# Patient Record
Sex: MALE | Race: WHITE | RURAL
[De-identification: names, ages, dates, MRNs, and addresses within clinical notes are randomized per-mention and may not be internally consistent; named-entity substitution may affect disease eponyms.]

---

## 2022-10-21 ENCOUNTER — OFFICE VISIT (OUTPATIENT)
Dept: INTERNAL MEDICINE CLINIC | Age: 62
End: 2022-10-21
Payer: COMMERCIAL

## 2022-10-21 VITALS
WEIGHT: 161.4 LBS | DIASTOLIC BLOOD PRESSURE: 94 MMHG | HEIGHT: 72 IN | BODY MASS INDEX: 21.86 KG/M2 | SYSTOLIC BLOOD PRESSURE: 166 MMHG | RESPIRATION RATE: 16 BRPM | HEART RATE: 62 BPM | TEMPERATURE: 97.7 F | OXYGEN SATURATION: 98 %

## 2022-10-21 DIAGNOSIS — K59.00 CONSTIPATION, UNSPECIFIED CONSTIPATION TYPE: ICD-10-CM

## 2022-10-21 DIAGNOSIS — Z76.89 ESTABLISHING CARE WITH NEW DOCTOR, ENCOUNTER FOR: ICD-10-CM

## 2022-10-21 DIAGNOSIS — R21 RASH: Primary | ICD-10-CM

## 2022-10-21 DIAGNOSIS — R10.9 ABDOMINAL PAIN, UNSPECIFIED ABDOMINAL LOCATION: ICD-10-CM

## 2022-10-21 PROCEDURE — 90686 IIV4 VACC NO PRSV 0.5 ML IM: CPT | Performed by: NURSE PRACTITIONER

## 2022-10-21 PROCEDURE — 99204 OFFICE O/P NEW MOD 45 MIN: CPT | Performed by: NURSE PRACTITIONER

## 2022-10-21 RX ORDER — SULFAMETHOXAZOLE AND TRIMETHOPRIM 800; 160 MG/1; MG/1
TABLET ORAL
COMMUNITY
Start: 2022-10-11 | End: 2022-10-21 | Stop reason: ALTCHOICE

## 2022-10-21 RX ORDER — PREDNISONE 20 MG/1
TABLET ORAL
COMMUNITY
Start: 2022-10-11 | End: 2022-10-21 | Stop reason: ALTCHOICE

## 2022-10-21 RX ORDER — CEPHALEXIN 500 MG/1
CAPSULE ORAL
COMMUNITY
Start: 2022-10-11 | End: 2022-10-21 | Stop reason: ALTCHOICE

## 2022-10-21 RX ORDER — HYDROGEN PEROXIDE 3 %
20 SOLUTION, NON-ORAL MISCELLANEOUS DAILY
COMMUNITY
End: 2022-10-21

## 2022-10-21 NOTE — PROGRESS NOTES
Soundra Left (: 1960) is a 63 y.o. male is here for evaluation of the following chief complaint(s): New Patient, ED Follow-up (Seen [facility] 10/11/2022), Abdominal Pain (X 2 weeks; periumbilical; hx hernia; feeling constipated; last BM this morning hard to pass; normal stool; not taking any stool softener or laxative), and Rash (X1 week; buttocks; painful)        Subjective/Objective:   Lynn Chow was seen today for New Patient, ED Follow-up (Seen [facility] 10/11/2022), Abdominal Pain (X 2 weeks; periumbilical; hx hernia; feeling constipated; last BM this morning hard to pass; normal stool; not taking any stool softener or laxative), and Rash (X1 week; buttocks; painful)  Pt presents to the clinic to establish care and for abdominal pain and a rash. Pt was seen in the ER on 10/11/2022 for both issues. Pt had abdominal pain for several months. Pt denies f/n/v/d but has some constipation. Pt denies any urinary complaints. Area of pain is at sight of prior hernia repair approx 5 years ago by Dr Piper Hood. Pt reports hx of gastric ulcer as well. PT prescribed Keflex and Bactrim for possible gastritis on CT which has been completed. Pt rash started last week to the groin area and spread to buttocks and back upper thighs. Pt denies itching. Exposure to animals plants new lotions creams foods or medications. Pt denies itching. Pt denies being in contact with people with a rash recently. Pt given prednisone prescription in the ER which pt states did help. PT denies any significant past medical hx otherwise. Pt also denies any other concerns. Review of Systems   Constitutional: Negative. HENT: Negative. Eyes: Negative. Respiratory: Negative. Cardiovascular: Negative. Gastrointestinal:  Positive for abdominal pain, constipation and nausea. Negative for blood in stool, diarrhea, melena and vomiting. Genitourinary: Negative. Musculoskeletal: Negative. Skin:  Positive for rash. Negative for itching. Neurological: Negative. Endo/Heme/Allergies: Negative. Psychiatric/Behavioral: Negative. Physical Exam  Vitals reviewed. Constitutional:       General: He is not in acute distress. Appearance: Normal appearance. He is normal weight. He is not ill-appearing. HENT:      Head: Normocephalic and atraumatic. Right Ear: Tympanic membrane, ear canal and external ear normal.      Left Ear: Tympanic membrane, ear canal and external ear normal.      Ears:      Comments: Pt denies any acute dental pain or drainage or swelling. Nose: Nose normal.      Mouth/Throat:      Lips: Pink. Mouth: Mucous membranes are moist.      Pharynx: Oropharynx is clear. Eyes:      General: Lids are normal.      Extraocular Movements: Extraocular movements intact. Conjunctiva/sclera: Conjunctivae normal.      Comments: Denies any acute vision changes. Neck:      Thyroid: No thyroid mass, thyromegaly or thyroid tenderness. Vascular: No carotid bruit or JVD. Trachea: Trachea and phonation normal.   Cardiovascular:      Rate and Rhythm: Normal rate and regular rhythm. Pulses: Normal pulses. Radial pulses are 2+ on the right side and 2+ on the left side. Heart sounds: Normal heart sounds, S1 normal and S2 normal.   Pulmonary:      Effort: Pulmonary effort is normal.      Breath sounds: Normal breath sounds and air entry. Abdominal:      General: Abdomen is flat. Bowel sounds are normal.      Palpations: Abdomen is soft. There is no hepatomegaly or splenomegaly. Tenderness: There is generalized abdominal tenderness. There is no right CVA tenderness or left CVA tenderness. Hernia: A hernia is present. Hernia is present in the umbilical area. Comments: Healed scar from prior hernia repair. Pt has some generalized tenderness no guarding or rebound. Musculoskeletal:         General: Normal range of motion.       Cervical back: Full passive range of motion without pain and normal range of motion. No spinous process tenderness or muscular tenderness. Right lower leg: No edema. Left lower leg: No edema. Lymphadenopathy:      Cervical: No cervical adenopathy. Skin:     Capillary Refill: Capillary refill takes less than 2 seconds. Findings: Rash present. Rash is macular. Neurological:      General: No focal deficit present. Mental Status: He is alert and oriented to person, place, and time. Psychiatric:         Mood and Affect: Mood normal.         Behavior: Behavior normal.         Thought Content: Thought content normal.         Judgment: Judgment normal.        BP (!) 166/94 (BP 1 Location: Left arm, BP Patient Position: Sitting)   Pulse 62   Temp 97.7 °F (36.5 °C) (Oral)   Resp 16   Ht 6' (1.829 m)   Wt 161 lb 6.4 oz (73.2 kg)   SpO2 98%   BMI 21.89 kg/m²      No visits with results within 6 Month(s) from this visit. Latest known visit with results is:   No results found for any previous visit. Past Surgical History:   Procedure Laterality Date    HX HERNIA REPAIR          Past Medical History:   Diagnosis Date    Hx of ulcer disease         Current Outpatient Medications   Medication Instructions    hydrocortisone 2 % lotion Topical, 3 TIMES DAILY        Assessment/Plan:     The above diagnosis is a new  problem. We discussed expected course, resolution, and complications of diagnosis in detail. I advised him to call back or return to office if symptoms worsen/change/persist.        ICD-10-CM ICD-9-CM    1. Rash  R21 782.1       2.  Abdominal pain, unspecified abdominal location  R10.9 789.00 REFERRAL TO GENERAL SURGERY      3. Establishing care with new doctor, encounter for  Z76.89 V65.8 INFLUENZA, FLUARIX, FLULAVAL, FLUZONE (AGE 6 MO+), AFLURIA(AGE 3Y+) IM, PF, 0.5 ML      LIPID PANEL      TSH 3RD GENERATION      CBC WITH AUTOMATED DIFF      HEPATITIS C AB, RFLX TO QT BY PCR METABOLIC PANEL, COMPREHENSIVE      PSA, DIAGNOSTIC (PROSTATE SPECIFIC AG)      4. Constipation, unspecified constipation type  K59.00 564.00          Return in about 4 weeks (around 11/18/2022). An electronic signature was used to authenticate this note.   -- Rafael Dial NP

## 2022-10-21 NOTE — PATIENT INSTRUCTIONS
It was a pleasure taking care of you. Please have labs drawn at [facility] as soon as possible. You have and appt with Dr Jaylon Gomez Surgeon on Nov 1st at [address] [address]hele am    [address] [facility], [address]  [facility]    Ph [address] conti for choosing [address].     John Montano, CORY

## 2022-10-21 NOTE — PROGRESS NOTES
Room: 7    Identified pt with two pt identifiers(name and ). Reviewed record in preparation for visit and have obtained necessary documentation. All patient medications has been reviewed. Chief Complaint   Patient presents with    New Patient    ED Follow-up     Seen [facility] ed 10/11/2022    Abdominal Pain     X 2 weeks; periumbilical; hx hernia; feeling constipated; last BM this morning hard to pass; normal stool; not taking any stool softener or laxative    Rash     X1 week; buttocks; painful       Health Maintenance Due   Topic    Hepatitis C Screening     Depression Screen     COVID-19 Vaccine (1)    DTaP/Tdap/Td series (1 - Tdap)    Lipid Screen     Colorectal Cancer Screening Combo     Shingrix Vaccine Age 50> (1 of 2)    Flu Vaccine (1)       Vitals:    10/21/22 0856   BP: (!) 166/94   Pulse: 62   Resp: 16   Temp: 97.7 °F (36.5 °C)   TempSrc: Oral   SpO2: 98%   Weight: 161 lb 6.4 oz (73.2 kg)   Height: 6' (1.829 m)   PainSc:   8   PainLoc: Abdomen       4. Have you been to the ER, urgent care clinic since your last visit? Hospitalized since your last visit? Yes      5. Have you seen or consulted any other health care providers outside of the [address] [place] [address] since your last visit? Include any pap smears or colon screening. No    6. Would you like to receive your flu shot today? YES    Patient is accompanied by self I have received verbal consent from Janae Tolliverllor to discuss any/all medical information while they are present in the room.

## 2022-10-22 LAB
ALBUMIN SERPL-MCNC: 4.4 G/DL (ref 3.8–4.9)
ALBUMIN/GLOB SERPL: 1.6 {RATIO} (ref 1.2–2.2)
ALP SERPL-CCNC: 112 IU/L (ref 44–121)
ALT SERPL-CCNC: 43 IU/L (ref 0–44)
AST SERPL-CCNC: 34 IU/L (ref 0–40)
BASOPHILS # BLD AUTO: 0.1 X10E3/UL (ref 0–0.2)
BASOPHILS NFR BLD AUTO: 1 %
BILIRUB SERPL-MCNC: 0.5 MG/DL (ref 0–1.2)
BUN SERPL-MCNC: 12 MG/DL (ref 6–24)
BUN/CREAT SERPL: 14 (ref 9–20)
CALCIUM SERPL-MCNC: 8.7 MG/DL (ref 8.7–10.2)
CHLORIDE SERPL-SCNC: 99 MMOL/L (ref 96–106)
CHOLEST SERPL-MCNC: 139 MG/DL (ref 100–199)
CO2 SERPL-SCNC: 23 MMOL/L (ref 20–29)
CREAT SERPL-MCNC: 0.83 MG/DL (ref 0.76–1.27)
EGFR: 101 ML/MIN/1.73
EOSINOPHIL # BLD AUTO: 0.1 X10E3/UL (ref 0–0.4)
EOSINOPHIL NFR BLD AUTO: 1 %
ERYTHROCYTE [DISTWIDTH] IN BLOOD BY AUTOMATED COUNT: 14.4 % (ref 11.6–15.4)
GLOBULIN SER CALC-MCNC: 2.8 G/DL (ref 1.5–4.5)
GLUCOSE SERPL-MCNC: 154 MG/DL (ref 70–99)
HCT VFR BLD AUTO: 37.8 % (ref 37.5–51)
HCV AB S/CO SERPL IA: <0.1 S/CO RATIO (ref 0–0.9)
HCV AB SERPL QL IA: NORMAL
HDLC SERPL-MCNC: 85 MG/DL
HGB BLD-MCNC: 12.9 G/DL (ref 13–17.7)
IMM GRANULOCYTES # BLD AUTO: 0.1 X10E3/UL (ref 0–0.1)
IMM GRANULOCYTES NFR BLD AUTO: 1 %
IMP & REVIEW OF LAB RESULTS: NORMAL
LDLC SERPL CALC-MCNC: 44 MG/DL (ref 0–99)
LYMPHOCYTES # BLD AUTO: 1.5 X10E3/UL (ref 0.7–3.1)
LYMPHOCYTES NFR BLD AUTO: 14 %
MCH RBC QN AUTO: 30.1 PG (ref 26.6–33)
MCHC RBC AUTO-ENTMCNC: 34.1 G/DL (ref 31.5–35.7)
MCV RBC AUTO: 88 FL (ref 79–97)
MONOCYTES # BLD AUTO: 0.8 X10E3/UL (ref 0.1–0.9)
MONOCYTES NFR BLD AUTO: 8 %
NEUTROPHILS # BLD AUTO: 7.8 X10E3/UL (ref 1.4–7)
NEUTROPHILS NFR BLD AUTO: 75 %
PLATELET # BLD AUTO: 327 X10E3/UL (ref 150–450)
POTASSIUM SERPL-SCNC: 4 MMOL/L (ref 3.5–5.2)
PROT SERPL-MCNC: 7.2 G/DL (ref 6–8.5)
PSA SERPL-MCNC: 0.2 NG/ML (ref 0–4)
RBC # BLD AUTO: 4.28 X10E6/UL (ref 4.14–5.8)
SODIUM SERPL-SCNC: 137 MMOL/L (ref 134–144)
TRIGL SERPL-MCNC: 44 MG/DL (ref 0–149)
TSH SERPL DL<=0.005 MIU/L-ACNC: 1.15 UIU/ML (ref 0.45–4.5)
VLDLC SERPL CALC-MCNC: 10 MG/DL (ref 5–40)
WBC # BLD AUTO: 10.3 X10E3/UL (ref 3.4–10.8)

## 2022-11-02 ENCOUNTER — OFFICE VISIT (OUTPATIENT)
Dept: SURGERY | Age: 62
End: 2022-11-02
Payer: COMMERCIAL

## 2022-11-02 VITALS
HEIGHT: 72 IN | TEMPERATURE: 98.2 F | SYSTOLIC BLOOD PRESSURE: 176 MMHG | DIASTOLIC BLOOD PRESSURE: 91 MMHG | HEART RATE: 74 BPM | OXYGEN SATURATION: 97 % | BODY MASS INDEX: 21.37 KG/M2 | WEIGHT: 157.8 LBS | RESPIRATION RATE: 18 BRPM

## 2022-11-02 DIAGNOSIS — L76.82 INCISIONAL PAIN: ICD-10-CM

## 2022-11-02 DIAGNOSIS — K59.00 CONSTIPATION, UNSPECIFIED CONSTIPATION TYPE: ICD-10-CM

## 2022-11-02 DIAGNOSIS — Z12.11 ENCOUNTER FOR SCREENING COLONOSCOPY: ICD-10-CM

## 2022-11-02 DIAGNOSIS — Z87.11 HISTORY OF GASTRIC ULCER: Primary | ICD-10-CM

## 2022-11-02 PROCEDURE — 99203 OFFICE O/P NEW LOW 30 MIN: CPT | Performed by: SURGERY

## 2022-11-02 RX ORDER — HYDROCORTISONE 25 MG/ML
LOTION TOPICAL
COMMUNITY
Start: 2022-10-21 | End: 2022-12-01

## 2022-11-02 NOTE — LETTER
11/2/2022    Patient: Roya Mello   YOB: 1960   Date of Visit: 11/2/2022     Christopher Fair NP  [address]  [address] [id number]  Via In Basket    Dear Christopher Fair NP,      Thank you for referring Mr. Aubrey Brown to [address] [place] [address] for evaluation. My notes for this consultation are attached. If you have questions, please do not hesitate to call me. I look forward to following your patient along with you.       Sincerely,    Estela Quintanilla MD

## 2022-11-02 NOTE — PROGRESS NOTES
Surgery History and Physical    Subjective:      Kelley Haney is a 63 y.o. male who presents for evaluation unspecified abdominal pain. He has had no imaging. He is not up to date on his colonoscopy. He has not had follow up since his gastric ulcer. He had a bleeding gastric ulcer in 2014 in [address] [place] [address] that required an ex lap. Then, he developed an inguinal hernia. He went to [facility] for repair. He reports generalized abdominal pain. Patient is able to eat. No bulges down in his groin. He is constipated and has to take miralax. Past Medical History:   Diagnosis Date    GERD (gastroesophageal reflux disease)     Hx of ulcer disease      Past Surgical History:   Procedure Laterality Date    HX HERNIA REPAIR        Family History   Problem Relation Age of Onset    Diabetes Mother     Cancer Mother     Ovarian Cancer Mother     Heart Attack Father     Cancer Brother     Colon Cancer Brother      Social History     Tobacco Use    Smoking status: Never    Smokeless tobacco: Never   Substance Use Topics    Alcohol use: Yes     Comment: 2 40 oz a day. Prior to Admission medications    Medication Sig Start Date End Date Taking? Authorizing Provider   hydrocortisone (HYTONE) 2.5 % lotion APPLY LOTION EXTERNALLY TO AFFECTED AREA THREE TIMES DAILY FOR 10 DAYS 10/21/22  Yes Provider, Historical      No Known Allergies    Review of Systems:  A comprehensive review of systems was negative except for that written in the History of Present Illness. Objective:   Visit Vitals  BP (!) 176/91 (BP 1 Location: Right upper arm, BP Patient Position: Sitting, BP Cuff Size: Small adult)   Pulse 74   Temp 98.2 °F (36.8 °C) (Temporal)   Resp 18   Ht 6' (1.829 m)   Wt 71.6 kg (157 lb 12.8 oz)   SpO2 97%   BMI 21.40 kg/m²         Physical Exam:  Physical Exam:  General:  Alert, cooperative, no distress, appears stated age. Eyes:  Conjunctivae/corneas clear. Ears:  Normal external ear canals both ears.    Nose: Nares normal. Septum midline. Mouth/Throat: Lips, mucosa, and tongue normal. Teeth and gums normal.   Neck: Supple, symmetrical, trachea midline   Back:   Symmetric, no curvature. ROM normal.    Lungs:   Clear to auscultation bilaterally. Heart:  Regular rate and rhythm   Abdomen:   Soft, non-tender. Bowel sounds normal. Well healed midline incision - no palpable hernia identified   Extremities: Extremities normal, atraumatic, no cyanosis or edema. Skin: Rash on his butt         Assessment:     63year old male with generalized abdominal pain    Plan:      Will refer to GI for EGD/Colonoscopy as patient is past due  Patient can follow up with PCP for his rash on his butt  Will obtain a CTAP to rule out any acute abnormality    Tyshawn Dunn MD

## 2022-11-02 NOTE — PROGRESS NOTES
Identified pt with two pt identifiers (name and ). Reviewed chart in preparation for visit and have obtained necessary documentation. Noemi Rapp is a 63 y.o. male  Chief Complaint   Patient presents with    Possible Hernia     Seen at the request of Nadya Cardoso, [place]. Visit Vitals  BP (!) 176/91 (BP 1 Location: Right upper arm, BP Patient Position: Sitting, BP Cuff Size: Small adult)   Pulse 74   Temp 98.2 °F (36.8 °C) (Temporal)   Resp 18   Ht 6' (1.829 m)   Wt 71.6 kg (157 lb 12.8 oz)   SpO2 97%   BMI 21.40 kg/m²       1. Have you been to the ER, urgent care clinic since your last visit? Hospitalized since your last visit? ER [place] abdominal pain. 2. Have you seen or consulted any other health care providers outside of the [address] [place] [address] since your last visit? Include any pap smears or colon screening.  No

## 2022-11-06 ENCOUNTER — TELEPHONE (OUTPATIENT)
Dept: FAMILY MEDICINE CLINIC | Age: 62
End: 2022-11-06

## 2022-11-06 NOTE — TELEPHONE ENCOUNTER
2 patient identifiers verified. Patient calling concerned about a rash. Was given hydrocortisone cream that is not helping. Patient states he was up all night due to rash. Patient was advised to go to urgent care to have rash better assessed.

## 2022-11-09 ENCOUNTER — HOSPITAL ENCOUNTER (OUTPATIENT)
Dept: CT IMAGING | Age: 62
Discharge: HOME | End: 2022-11-09
Attending: SURGERY
Payer: COMMERCIAL

## 2022-11-09 ENCOUNTER — TELEPHONE (OUTPATIENT)
Dept: SURGERY | Age: 62
End: 2022-11-09

## 2022-11-09 PROCEDURE — 74177 CT ABD & PELVIS W/CONTRAST: CPT

## 2022-11-09 PROCEDURE — 74011000636 HC RX REV CODE- 636: Performed by: SURGERY

## 2022-11-09 PROCEDURE — 74011000250 HC RX REV CODE- 250: Performed by: SURGERY

## 2022-11-09 RX ORDER — BARIUM SULFATE 20 MG/ML
450 SUSPENSION ORAL
Status: COMPLETED | OUTPATIENT
Start: 2022-11-09 | End: 2022-11-09

## 2022-11-09 RX ADMIN — BARIUM SULFATE 450 ML: 20 SUSPENSION ORAL at 06:52

## 2022-11-09 RX ADMIN — IOPAMIDOL 100 ML: 755 INJECTION, SOLUTION INTRAVENOUS at 08:20

## 2022-11-09 NOTE — TELEPHONE ENCOUNTER
Called patient. CT: IMPRESSION  No acute intraperitoneal process is identified. Moderate to severe fecal stasis with large stool ball in the rectum. Please see above for additional nonemergent incidental findings. Recommend enemas to help clear him out.  He has an apt with GI in [place].    Raisa Weaver MD

## 2022-11-23 ENCOUNTER — OFFICE VISIT (OUTPATIENT)
Dept: INTERNAL MEDICINE CLINIC | Age: 62
End: 2022-11-23
Payer: COMMERCIAL

## 2022-11-23 ENCOUNTER — PATIENT MESSAGE (OUTPATIENT)
Dept: INTERNAL MEDICINE CLINIC | Age: 62
End: 2022-11-23

## 2022-11-23 VITALS
WEIGHT: 162 LBS | HEIGHT: 72 IN | HEART RATE: 70 BPM | TEMPERATURE: 98 F | BODY MASS INDEX: 21.94 KG/M2 | RESPIRATION RATE: 18 BRPM | OXYGEN SATURATION: 95 % | SYSTOLIC BLOOD PRESSURE: 171 MMHG | DIASTOLIC BLOOD PRESSURE: 85 MMHG

## 2022-11-23 DIAGNOSIS — R21 RASH: Primary | ICD-10-CM

## 2022-11-23 DIAGNOSIS — R03.0 ELEVATED BLOOD PRESSURE READING: ICD-10-CM

## 2022-11-23 PROCEDURE — 99212 OFFICE O/P EST SF 10 MIN: CPT | Performed by: NURSE PRACTITIONER

## 2022-11-23 RX ORDER — DOXYCYCLINE 100 MG/1
100 CAPSULE ORAL 2 TIMES DAILY
Qty: 14 CAPSULE | Refills: 0 | Status: SHIPPED | OUTPATIENT
Start: 2022-11-23 | End: 2022-11-30

## 2022-11-23 RX ORDER — PERMETHRIN 50 MG/G
CREAM TOPICAL
COMMUNITY
Start: 2022-11-07 | End: 2022-12-01

## 2022-11-23 RX ORDER — FAMOTIDINE 20 MG/1
TABLET, FILM COATED ORAL
COMMUNITY
Start: 2022-11-07

## 2022-11-23 RX ORDER — PREDNISONE 50 MG/1
TABLET ORAL
COMMUNITY
Start: 2022-11-07 | End: 2022-12-01

## 2022-11-23 RX ORDER — AMLODIPINE BESYLATE 5 MG/1
5 TABLET ORAL DAILY
Qty: 60 TABLET | Refills: 0 | Status: SHIPPED | OUTPATIENT
Start: 2022-11-23 | End: 2022-12-01 | Stop reason: DRUGHIGH

## 2022-11-23 RX ORDER — PERMETHRIN 50 MG/G
CREAM TOPICAL
Qty: 60 G | Refills: 0 | Status: CANCELLED | OUTPATIENT
Start: 2022-11-23

## 2022-11-23 NOTE — PROGRESS NOTES
Leonila Barone (: 1960) is a 63 y.o. male is here for evaluation of the following chief complaint(s): Hospital Follow Up and Skin Problem (22 hospital encounter for scabies and peptic ucler disease )        Subjective/Objective:   Breanna Dallas was seen today for Hospital Follow Up and Skin Problem (22 hospital encounter for scabies and peptic ucler disease )  Pt states the abdominal pain has resolved. Pt has appt with GI in  for colonoscopy and EGD. PT denies any blood in urine. Pt reports rash improved with the steroid pills and the Acticin but not the steroid cream. After tx with the Acitin the rash worsening again. Blood pressure noted to be elevated but pt in discomfort from the rash. Review of Systems   Constitutional: Negative. HENT: Negative. Eyes: Negative. Cardiovascular: Negative. Gastrointestinal: Negative. Genitourinary: Negative. Skin:  Positive for itching and rash. Neurological: Negative. Endo/Heme/Allergies: Negative. Psychiatric/Behavioral: Negative. Physical Exam  Vitals reviewed. Constitutional:       Appearance: Normal appearance. He is normal weight. HENT:      Head: Normocephalic and atraumatic. Right Ear: External ear normal.      Left Ear: External ear normal.      Nose: Nose normal.      Mouth/Throat:      Mouth: Mucous membranes are moist.   Eyes:      Conjunctiva/sclera: Conjunctivae normal.   Cardiovascular:      Rate and Rhythm: Normal rate and regular rhythm. Pulses: Normal pulses. Heart sounds: Normal heart sounds. Pulmonary:      Effort: Pulmonary effort is normal.      Breath sounds: Normal breath sounds. Abdominal:      Palpations: Abdomen is soft. Musculoskeletal:         General: Normal range of motion. Cervical back: Normal range of motion. Skin:     General: Skin is warm and dry. Capillary Refill: Capillary refill takes less than 2 seconds. Findings: Erythema and rash present. Rash is crusting. Neurological:      General: No focal deficit present. Mental Status: He is alert and oriented to person, place, and time. Psychiatric:         Mood and Affect: Mood normal.         Behavior: Behavior normal.         Thought Content: Thought content normal.         Judgment: Judgment normal.        BP (!) 171/85 (BP 1 Location: Left upper arm, BP Patient Position: Sitting, BP Cuff Size: Adult)   Pulse 70   Temp 98 °F (36.7 °C) (Temporal)   Resp 18   Ht 6' (1.829 m)   Wt 162 lb (73.5 kg)   SpO2 95%   BMI 21.97 kg/m²      Office Visit on 10/21/2022   Component Date Value Ref Range Status    WBC 10/21/2022 10.3  3.4 - 10.8 x10E3/uL Final    RBC 10/21/2022 4.28  4.14 - 5.80 x10E6/uL Final    HGB 10/21/2022 12.9 (A)  13.0 - 17.7 g/dL Final    HCT 10/21/2022 37.8  37.5 - 51.0 % Final    MCV 10/21/2022 88  79 - 97 fL Final    MCH 10/21/2022 30.1  26.6 - 33.0 pg Final    MCHC 10/21/2022 34.1  31.5 - 35.7 g/dL Final    RDW 10/21/2022 14.4  11.6 - 15.4 % Final    PLATELET 10/23/9116 973  150 - 450 x10E3/uL Final    NEUTROPHILS 10/21/2022 75  Not Estab. % Final    Lymphocytes 10/21/2022 14  Not Estab. % Final    MONOCYTES 10/21/2022 8  Not Estab. % Final    EOSINOPHILS 10/21/2022 1  Not Estab. % Final    BASOPHILS 10/21/2022 1  Not Estab. % Final    ABS. NEUTROPHILS 10/21/2022 7.8 (A)  1.4 - 7.0 x10E3/uL Final    Abs Lymphocytes 10/21/2022 1.5  0.7 - 3.1 x10E3/uL Final    ABS. MONOCYTES 10/21/2022 0.8  0.1 - 0.9 x10E3/uL Final    ABS. EOSINOPHILS 10/21/2022 0.1  0.0 - 0.4 x10E3/uL Final    ABS. BASOPHILS 10/21/2022 0.1  0.0 - 0.2 x10E3/uL Final    IMMATURE GRANULOCYTES 10/21/2022 1  Not Estab. % Final    ABS. IMM. GRANS.  10/21/2022 0.1  0.0 - 0.1 x10E3/uL Final    Glucose 10/21/2022 154 (A)  70 - 99 mg/dL Final    BUN 10/21/2022 12  6 - 24 mg/dL Final    Creatinine 10/21/2022 0.83  0.76 - 1.27 mg/dL Final    eGFR 10/21/2022 101  >59 mL/min/1.73 Final    BUN/Creatinine ratio 10/21/2022 14 9 - 20 Final    Sodium 10/21/2022 137  134 - 144 mmol/L Final    Potassium 10/21/2022 4.0  3.5 - 5.2 mmol/L Final    Chloride 10/21/2022 99  96 - 106 mmol/L Final    CO2 10/21/2022 23  20 - 29 mmol/L Final    Calcium 10/21/2022 8.7  8.7 - 10.2 mg/dL Final    Protein, total 10/21/2022 7.2  6.0 - 8.5 g/dL Final    Albumin 10/21/2022 4.4  3.8 - 4.9 g/dL Final    GLOBULIN, TOTAL 10/21/2022 2.8  1.5 - 4.5 g/dL Final    A-G Ratio 10/21/2022 1.6  1.2 - 2.2 Final    Bilirubin, total 10/21/2022 0.5  0.0 - 1.2 mg/dL Final    Alk. phosphatase 10/21/2022 112  44 - 121 IU/L Final    AST (SGOT) 10/21/2022 34  0 - 40 IU/L Final    ALT (SGPT) 10/21/2022 43  0 - 44 IU/L Final    Cholesterol, total 10/21/2022 139  100 - 199 mg/dL Final    Triglyceride 10/21/2022 44  0 - 149 mg/dL Final    HDL Cholesterol 10/21/2022 85  >39 mg/dL Final    VLDL, calculated 10/21/2022 10  5 - 40 mg/dL Final    LDL, calculated 10/21/2022 44  0 - 99 mg/dL Final    HCV Ab 10/21/2022 <0.1  0.0 - 0.9 s/co ratio Final    HCV Interpretation 10/21/2022 Comment   Final    Comment: Negative  Not infected with HCV, unless recent infection is suspected or other  evidence exists to indicate HCV infection. TSH 10/21/2022 1.150  0.450 - 4.500 uIU/mL Final    Prostate Specific Ag 10/21/2022 0.2  0.0 - 4.0 ng/mL Final    Comment: Roche ECLIA methodology. According to the American Urological Association, Serum PSA should  decrease and remain at undetectable levels after radical  prostatectomy. The AUA defines biochemical recurrence as an initial  PSA value 0.2 ng/mL or greater followed by a subsequent confirmatory  PSA value 0.2 ng/mL or greater. Values obtained with different assay methods or kits cannot be used  interchangeably. Results cannot be interpreted as absolute evidence  of the presence or absence of malignant disease. INTERPRETATION 10/21/2022 Note   Final    Supplemental report is available.          Past Surgical History:   Procedure Laterality Date    HX HERNIA REPAIR          Past Medical History:   Diagnosis Date    GERD (gastroesophageal reflux disease)     Hx of ulcer disease         Current Outpatient Medications   Medication Instructions    doxycycline (VIBRAMYCIN) 100 mg, Oral, 2 TIMES DAILY    famotidine (PEPCID) 20 mg tablet TAKE 1 TABLET BY MOUTH TWICE DAILY FOR 15 DAYS    hydrocortisone (HYTONE) 2.5 % lotion APPLY LOTION EXTERNALLY TO AFFECTED AREA THREE TIMES DAILY FOR 10 DAYS    permethrin (ACTICIN) 5 % topical cream APPLY 1 APPLICATION TOPICALLY ONCE FOR 1 DOSE TO ENTIRE BODY AT NIGHT AND WASH OFF IN THE MORNING. REPEAT IN 1 WEEK    predniSONE (DELTASONE) 50 mg tablet TAKE 1 TABLET BY MOUTH DAILY FOR 4 DAYS        Assessment/Plan:     The above diagnosis is a new problem. We discussed expected course, resolution, and complications of diagnosis in detail. I advised him to call back or return to office if symptoms worsen/change/persist.        ICD-10-CM ICD-9-CM    1. Rash  R21 782.1       2. Elevated blood pressure reading  R03.0 796.2          Return in about 3 months (around 2/23/2023), or if symptoms worsen or fail to improve. An electronic signature was used to authenticate this note.   -- Ama Chauhan NP

## 2022-11-23 NOTE — TELEPHONE ENCOUNTER
Spoke with sister Shivani Ospina that he has hx of HTN and anxiety. Norvasc 5 mg sent to pharmacy and appt made Fri 11/25/2022 0930 for anxiety.

## 2022-11-23 NOTE — PROGRESS NOTES
Room:  Identified pt with two pt identifiers(name and ). Reviewed record in preparation for visit and have obtained necessary documentation. Chief Complaint   Patient presents with    Hospital Follow Up    Skin Problem     22 hospital encounter for scabies and peptic ucler disease         Vitals:    22 0852   BP: (!) 171/85   Pulse: 70   Resp: 18   Temp: 98 °F (36.7 °C)   TempSrc: Temporal   SpO2: 95%   Weight: 162 lb (73.5 kg)   Height: 6' (1.829 m)   PainSc:   0 - No pain       Health Maintenance Due   Topic    DTaP/Tdap/Td series (1 - Tdap)    Colorectal Cancer Screening Combo     Shingrix Vaccine Age 50> (1 of 2)       1. \"Have you been to the ER, urgent care clinic since your last visit? Hospitalized since your last visit? \" Scabies 22 at [address] [place] [address], records received. 2. \"Have you seen or consulted any other health care providers outside of the [address] [place] [address] since your last visit? \" Yes at [address] [place] [address], records requested through [id number]     3. For patients over 45: Has the patient had a colonoscopy? No     If the patient is female:    4. For patients over 36: Has the patient had a mammogram? NA - based on age    11. For patients over 21: Has the patient had a pap smear? NA - based on age    Current Outpatient Medications   Medication Instructions    famotidine (PEPCID) 20 mg tablet TAKE 1 TABLET BY MOUTH TWICE DAILY FOR 15 DAYS    hydrocortisone (HYTONE) 2.5 % lotion APPLY LOTION EXTERNALLY TO AFFECTED AREA THREE TIMES DAILY FOR 10 DAYS    permethrin (ACTICIN) 5 % topical cream APPLY 1 APPLICATION TOPICALLY ONCE FOR 1 DOSE TO ENTIRE BODY AT NIGHT AND WASH OFF IN THE MORNING.  REPEAT IN 1 WEEK    predniSONE (DELTASONE) 50 mg tablet TAKE 1 TABLET BY MOUTH DAILY FOR 4 DAYS       No Known Allergies    Immunization History   Administered Date(s) Administered    COVID-19, YOSEF alvarez, (age 18y+ booster, 6y-11y series), IM, 50 mcg/0.5 mL 2021, 2021    COVID-19, PFIZER Bivalent BOOSTER, (age 12y+), IM, 30 mcg/0.3 mL dose 12/13/2021, 11/04/2022    Influenza Vaccine 10/23/2013, 10/08/2014    Influenza, FLUARIX, FLULAVAL, FLUZONE (age 10 mo+) AND AFLURIA, (age 1 y+), PF, 0.5mL 10/21/2022       Past Medical History:   Diagnosis Date    GERD (gastroesophageal reflux disease)     Hx of ulcer disease

## 2022-11-23 NOTE — PATIENT INSTRUCTIONS
It was a pleasure to take care of you. Please use medication as prescribed and return to the clinic for any new or worsening symptoms.      Duane Holt, CORY

## 2022-11-25 ENCOUNTER — OFFICE VISIT (OUTPATIENT)
Dept: INTERNAL MEDICINE CLINIC | Age: 62
End: 2022-11-25
Payer: COMMERCIAL

## 2022-11-25 VITALS
OXYGEN SATURATION: 98 % | SYSTOLIC BLOOD PRESSURE: 150 MMHG | HEART RATE: 64 BPM | HEIGHT: 72 IN | RESPIRATION RATE: 20 BRPM | WEIGHT: 159 LBS | DIASTOLIC BLOOD PRESSURE: 100 MMHG | BODY MASS INDEX: 21.54 KG/M2 | TEMPERATURE: 97.8 F

## 2022-11-25 DIAGNOSIS — M79.601 RIGHT ARM PAIN: Primary | ICD-10-CM

## 2022-11-25 DIAGNOSIS — F41.9 ANXIETY: ICD-10-CM

## 2022-11-25 DIAGNOSIS — I10 PRIMARY HYPERTENSION: ICD-10-CM

## 2022-11-25 PROCEDURE — 99214 OFFICE O/P EST MOD 30 MIN: CPT | Performed by: NURSE PRACTITIONER

## 2022-11-25 RX ORDER — ESCITALOPRAM OXALATE 5 MG/1
5 TABLET ORAL DAILY
Qty: 60 TABLET | Refills: 0 | Status: SHIPPED | OUTPATIENT
Start: 2022-11-25 | End: 2023-01-24

## 2022-11-25 NOTE — PROGRESS NOTES
Headache today  Chief Complaint   Patient presents with    Elevated Blood Pressure     Did not  the Amlodipine yet , closed due to holiday

## 2022-11-25 NOTE — PROGRESS NOTES
Juany Abraham (: 1960) is a 63 y.o. male is here for evaluation of the following chief complaint(s): Elevated Blood Pressure (Did not  the Amlodipine yet , closed due to holiday)        Subjective/Objective:   Cabrera Willis was seen today for Elevated Blood Pressure (Did not  the Amlodipine yet , closed due to holiday)  Pt with sister and now reports \"crippling\" anxiety for most of his lift and has never been seen by psych or treated for this. Would like referral to [facility]. Pt denies SI HI AVH. Pt also thinks he \"tore\" his bicep while working as a  about a month ago. Pt has noticed the muscle is in a ball now. Pt denies decreased movement of arm numbness tingling or strength. Review of Systems   Constitutional: Negative. HENT: Negative. Eyes: Negative. Respiratory: Negative. Cardiovascular: Negative. Gastrointestinal: Negative. Genitourinary: Negative. Musculoskeletal:  Negative for back pain, falls, joint pain, myalgias and neck pain. Pain to upper arm. Skin: Negative. Neurological: Negative. Endo/Heme/Allergies: Negative. Psychiatric/Behavioral:  The patient is nervous/anxious. Physical Exam  Constitutional:       General: He is not in acute distress. Appearance: Normal appearance. He is not ill-appearing. HENT:      Head: Normocephalic and atraumatic. Right Ear: External ear normal.      Left Ear: External ear normal.      Nose: Nose normal.      Mouth/Throat:      Mouth: Mucous membranes are moist.   Eyes:      Conjunctiva/sclera: Conjunctivae normal.   Cardiovascular:      Rate and Rhythm: Normal rate and regular rhythm. Pulses: Normal pulses. Heart sounds: Normal heart sounds. Pulmonary:      Effort: Pulmonary effort is normal.      Breath sounds: Normal breath sounds. Abdominal:      Palpations: Abdomen is soft. Musculoskeletal:      Right upper arm: Swelling present.  No edema, deformity, lacerations, tenderness or bony tenderness. Left upper arm: Normal.        Arms:       Comments: Lump noted to right upper arm likely humerus and possible tear. Pt does not have decreased strength movement or sensation. Skin:     General: Skin is warm and dry. Capillary Refill: Capillary refill takes less than 2 seconds. Neurological:      General: No focal deficit present. Mental Status: He is alert and oriented to person, place, and time. Psychiatric:         Attention and Perception: Attention and perception normal.         Mood and Affect: Mood is anxious. Speech: Speech normal.         Behavior: Behavior normal.         Thought Content: Thought content normal.         Cognition and Memory: Cognition and memory normal.         Judgment: Judgment normal.        BP (!) 150/100 (BP 1 Location: Right arm, BP Patient Position: Sitting, BP Cuff Size: Large adult)   Pulse 64   Temp 97.8 °F (36.6 °C) (Temporal)   Resp 20   Ht 6' (1.829 m)   Wt 159 lb (72.1 kg)   SpO2 98%   BMI 21.56 kg/m²      Office Visit on 10/21/2022   Component Date Value Ref Range Status    WBC 10/21/2022 10.3  3.4 - 10.8 x10E3/uL Final    RBC 10/21/2022 4.28  4.14 - 5.80 x10E6/uL Final    HGB 10/21/2022 12.9 (A)  13.0 - 17.7 g/dL Final    HCT 10/21/2022 37.8  37.5 - 51.0 % Final    MCV 10/21/2022 88  79 - 97 fL Final    MCH 10/21/2022 30.1  26.6 - 33.0 pg Final    MCHC 10/21/2022 34.1  31.5 - 35.7 g/dL Final    RDW 10/21/2022 14.4  11.6 - 15.4 % Final    PLATELET 80/76/9701 007  150 - 450 x10E3/uL Final    NEUTROPHILS 10/21/2022 75  Not Estab. % Final    Lymphocytes 10/21/2022 14  Not Estab. % Final    MONOCYTES 10/21/2022 8  Not Estab. % Final    EOSINOPHILS 10/21/2022 1  Not Estab. % Final    BASOPHILS 10/21/2022 1  Not Estab. % Final    ABS. NEUTROPHILS 10/21/2022 7.8 (A)  1.4 - 7.0 x10E3/uL Final    Abs Lymphocytes 10/21/2022 1.5  0.7 - 3.1 x10E3/uL Final    ABS.  MONOCYTES 10/21/2022 0.8  0.1 - 0.9 x10E3/uL Final    ABS. EOSINOPHILS 10/21/2022 0.1  0.0 - 0.4 x10E3/uL Final    ABS. BASOPHILS 10/21/2022 0.1  0.0 - 0.2 x10E3/uL Final    IMMATURE GRANULOCYTES 10/21/2022 1  Not Estab. % Final    ABS. IMM. GRANS. 10/21/2022 0.1  0.0 - 0.1 x10E3/uL Final    Glucose 10/21/2022 154 (A)  70 - 99 mg/dL Final    BUN 10/21/2022 12  6 - 24 mg/dL Final    Creatinine 10/21/2022 0.83  0.76 - 1.27 mg/dL Final    eGFR 10/21/2022 101  >59 mL/min/1.73 Final    BUN/Creatinine ratio 10/21/2022 14  9 - 20 Final    Sodium 10/21/2022 137  134 - 144 mmol/L Final    Potassium 10/21/2022 4.0  3.5 - 5.2 mmol/L Final    Chloride 10/21/2022 99  96 - 106 mmol/L Final    CO2 10/21/2022 23  20 - 29 mmol/L Final    Calcium 10/21/2022 8.7  8.7 - 10.2 mg/dL Final    Protein, total 10/21/2022 7.2  6.0 - 8.5 g/dL Final    Albumin 10/21/2022 4.4  3.8 - 4.9 g/dL Final    GLOBULIN, TOTAL 10/21/2022 2.8  1.5 - 4.5 g/dL Final    A-G Ratio 10/21/2022 1.6  1.2 - 2.2 Final    Bilirubin, total 10/21/2022 0.5  0.0 - 1.2 mg/dL Final    Alk. phosphatase 10/21/2022 112  44 - 121 IU/L Final    AST (SGOT) 10/21/2022 34  0 - 40 IU/L Final    ALT (SGPT) 10/21/2022 43  0 - 44 IU/L Final    Cholesterol, total 10/21/2022 139  100 - 199 mg/dL Final    Triglyceride 10/21/2022 44  0 - 149 mg/dL Final    HDL Cholesterol 10/21/2022 85  >39 mg/dL Final    VLDL, calculated 10/21/2022 10  5 - 40 mg/dL Final    LDL, calculated 10/21/2022 44  0 - 99 mg/dL Final    HCV Ab 10/21/2022 <0.1  0.0 - 0.9 s/co ratio Final    HCV Interpretation 10/21/2022 Comment   Final    Comment: Negative  Not infected with HCV, unless recent infection is suspected or other  evidence exists to indicate HCV infection. TSH 10/21/2022 1.150  0.450 - 4.500 uIU/mL Final    Prostate Specific Ag 10/21/2022 0.2  0.0 - 4.0 ng/mL Final    Comment: Roche ECLIA methodology. According to the American Urological Association, Serum PSA should  decrease and remain at undetectable levels after radical  prostatectomy.  The AUA defines biochemical recurrence as an initial  PSA value 0.2 ng/mL or greater followed by a subsequent confirmatory  PSA value 0.2 ng/mL or greater. Values obtained with different assay methods or kits cannot be used  interchangeably. Results cannot be interpreted as absolute evidence  of the presence or absence of malignant disease. INTERPRETATION 10/21/2022 Note   Final    Supplemental report is available. Past Surgical History:   Procedure Laterality Date    HX HERNIA REPAIR          Past Medical History:   Diagnosis Date    GERD (gastroesophageal reflux disease)     Hx of ulcer disease         Current Outpatient Medications   Medication Instructions    amLODIPine (NORVASC) 5 mg, Oral, DAILY    doxycycline (VIBRAMYCIN) 100 mg, Oral, 2 TIMES DAILY    escitalopram oxalate (LEXAPRO) 5 mg, Oral, DAILY    famotidine (PEPCID) 20 mg tablet TAKE 1 TABLET BY MOUTH TWICE DAILY FOR 15 DAYS    hydrocortisone (HYTONE) 2.5 % lotion APPLY LOTION EXTERNALLY TO AFFECTED AREA THREE TIMES DAILY FOR 10 DAYS    permethrin (ACTICIN) 5 % topical cream APPLY 1 APPLICATION TOPICALLY ONCE FOR 1 DOSE TO ENTIRE BODY AT NIGHT AND WASH OFF IN THE MORNING. REPEAT IN 1 WEEK    predniSONE (DELTASONE) 50 mg tablet TAKE 1 TABLET BY MOUTH DAILY FOR 4 DAYS        Assessment/Plan:     The above diagnosis is a chronic problem. We discussed expected course, resolution, and complications of diagnosis in detail. I advised him to call back or return to office if symptoms worsen/change/persist.        ICD-10-CM ICD-9-CM    1. Right arm pain  M79.601 729.5 MRI HUMERUS  RT WO CONT      2. Anxiety  F41.9 300.00 REFERRAL TO BEHAVIORAL HEALTH      3. Primary hypertension  I10 401.9          Return in about 4 weeks (around 12/23/2022). An electronic signature was used to authenticate this note.   -- Kristina Schultz NP

## 2022-11-25 NOTE — PATIENT INSTRUCTIONS
Please start taking the Lexapro and referral to [facility] placed. Please start taking the Norvasc as well. Return in 1 month for recheck of anxiety and blood pressure. Return sooner for any side effects of medications.     Sarai Perez, NP

## 2022-11-27 ENCOUNTER — TELEPHONE (OUTPATIENT)
Dept: FAMILY MEDICINE CLINIC | Age: 62
End: 2022-11-27

## 2022-11-27 NOTE — TELEPHONE ENCOUNTER
On Call note:     Received a call from patient's sister (PHI Release confirmed). She is concerned that pt's blood pressure has hong elevated for the past several days. She reports that it has ranged from 589B-807R systolic. She asked if he could take 10mg of amlodipine instead of 5 until he can schedule a follow up next week. Agreed with suggestion, advised to continue to monitor blood pressure at home. She is several hours away from patient, but plans to come to him as soon as possible, reports that he has mental health struggles that impeded his ability to care for his medical issues. Advised to call and schedule follow up with pt's PCP.

## 2022-11-28 ENCOUNTER — DOCUMENTATION ONLY (OUTPATIENT)
Dept: INTERNAL MEDICINE CLINIC | Age: 62
End: 2022-11-28

## 2022-11-28 ENCOUNTER — PATIENT MESSAGE (OUTPATIENT)
Dept: INTERNAL MEDICINE CLINIC | Age: 62
End: 2022-11-28

## 2022-11-28 NOTE — TELEPHONE ENCOUNTER
Spoke with Sister Carmen Blake. States patient blood pressure not any better. Will have patient come in to office Dec 1 2022 at 1300 for recheck of BP and evaluation of Lexapro. Sister requesting that Lexapro be given at night because it makes him sleepy. This is the only complaint regarding side effects.

## 2022-12-01 ENCOUNTER — TELEPHONE (OUTPATIENT)
Dept: INTERNAL MEDICINE CLINIC | Age: 62
End: 2022-12-01

## 2022-12-01 ENCOUNTER — OFFICE VISIT (OUTPATIENT)
Dept: INTERNAL MEDICINE CLINIC | Age: 62
End: 2022-12-01
Payer: COMMERCIAL

## 2022-12-01 VITALS
BODY MASS INDEX: 21.26 KG/M2 | HEIGHT: 72 IN | RESPIRATION RATE: 20 BRPM | HEART RATE: 67 BPM | OXYGEN SATURATION: 98 % | WEIGHT: 157 LBS | TEMPERATURE: 99.1 F | SYSTOLIC BLOOD PRESSURE: 197 MMHG | DIASTOLIC BLOOD PRESSURE: 104 MMHG

## 2022-12-01 DIAGNOSIS — I10 PRIMARY HYPERTENSION: Primary | ICD-10-CM

## 2022-12-01 DIAGNOSIS — R21 RASH: ICD-10-CM

## 2022-12-01 PROCEDURE — 93000 ELECTROCARDIOGRAM COMPLETE: CPT | Performed by: NURSE PRACTITIONER

## 2022-12-01 PROCEDURE — 99214 OFFICE O/P EST MOD 30 MIN: CPT | Performed by: NURSE PRACTITIONER

## 2022-12-01 RX ORDER — AMOXICILLIN AND CLAVULANATE POTASSIUM 875; 125 MG/1; MG/1
1 TABLET, FILM COATED ORAL EVERY 12 HOURS
Qty: 20 TABLET | Refills: 0 | Status: SHIPPED | OUTPATIENT
Start: 2022-12-01 | End: 2022-12-11

## 2022-12-01 RX ORDER — AMLODIPINE BESYLATE 10 MG/1
10 TABLET ORAL DAILY
Qty: 60 TABLET | Refills: 0 | Status: SHIPPED | OUTPATIENT
Start: 2022-12-01 | End: 2023-01-30

## 2022-12-01 RX ORDER — MUPIROCIN 20 MG/G
OINTMENT TOPICAL 2 TIMES DAILY
Qty: 22 G | Refills: 0 | Status: SHIPPED | OUTPATIENT
Start: 2022-12-01 | End: 2022-12-08

## 2022-12-01 RX ORDER — LISINOPRIL 10 MG/1
10 TABLET ORAL DAILY
Qty: 30 TABLET | Refills: 1 | Status: SHIPPED | OUTPATIENT
Start: 2022-12-01 | End: 2023-01-30

## 2022-12-01 NOTE — PROGRESS NOTES
Win Ramachandran (: 1960) is a 28[address] [facility] y.o. male is here for evaluation of the following chief complaint(s): Elevated Blood Pressure (Follow up to discuss medications)        Subjective/Objective:   Matilde Stubbs was seen today for Elevated Blood Pressure (Follow up to discuss medications)  Pt reports taking 10 mg of the Norvasc since the weekend. No change in blood pressure. Pt denies chest pain sob vision changes focal weakness numbness tingling. Pt reports occasional HA. Rash only mildly better. Abx changed to Augmentin and Mupirocin ointment prescribed. Strong ER warnings given. EKG: sinus bradycardia. No ST changes or flipped T waves. Review of Systems   Constitutional: Negative. HENT: Negative. Eyes: Negative. Respiratory: Negative. Cardiovascular: Negative. Gastrointestinal: Negative. Genitourinary: Negative. Musculoskeletal: Negative. Skin:  Positive for itching and rash. Neurological: Negative. Endo/Heme/Allergies: Negative. Psychiatric/Behavioral: Negative. Physical Exam  Vitals reviewed. Constitutional:       General: He is not in acute distress. Appearance: Normal appearance. He is normal weight. HENT:      Head: Normocephalic and atraumatic. Right Ear: External ear normal.      Left Ear: External ear normal.      Nose: Nose normal.      Mouth/Throat:      Mouth: Mucous membranes are moist.   Eyes:      Conjunctiva/sclera: Conjunctivae normal.   Cardiovascular:      Rate and Rhythm: Normal rate and regular rhythm. Pulses: Normal pulses. Heart sounds: Normal heart sounds. Pulmonary:      Effort: Pulmonary effort is normal.      Breath sounds: Normal breath sounds. Abdominal:      Palpations: Abdomen is soft. Musculoskeletal:         General: Normal range of motion. Cervical back: Normal range of motion. Skin:     General: Skin is warm and dry. Findings: Rash present.              Comments: Rash to buttocks and less so to lower back. No drainage or warmth fluctuance noted. Neurological:      General: No focal deficit present. Mental Status: He is alert and oriented to person, place, and time. Cranial Nerves: Cranial nerves 2-12 are intact. Sensory: Sensation is intact. Motor: Motor function is intact. Coordination: Coordination is intact. Romberg sign negative. Finger-Nose-Finger Test normal.      Gait: Gait is intact. Deep Tendon Reflexes:      Reflex Scores:       Bicep reflexes are 2+ on the right side and 2+ on the left side. Patellar reflexes are 2+ on the right side and 2+ on the left side. Comments: Neuro status at baseline. Psychiatric:         Mood and Affect: Mood normal.         Behavior: Behavior normal.         Thought Content: Thought content normal.         Judgment: Judgment normal.        BP (!) 197/104 (BP 1 Location: Right arm, BP Patient Position: Sitting, BP Cuff Size: Adult)   Pulse 67   Temp 99.1 °F (37.3 °C) (Temporal)   Resp 20   Ht 6' (1.829 m)   Wt 157 lb (71.2 kg)   SpO2 98%   BMI 21.29 kg/m²      Office Visit on 10/21/2022   Component Date Value Ref Range Status    WBC 10/21/2022 10.3  3.4 - 10.8 x10E3/uL Final    RBC 10/21/2022 4.28  4.14 - 5.80 x10E6/uL Final    HGB 10/21/2022 12.9 (A)  13.0 - 17.7 g/dL Final    HCT 10/21/2022 37.8  37.5 - 51.0 % Final    MCV 10/21/2022 88  79 - 97 fL Final    MCH 10/21/2022 30.1  26.6 - 33.0 pg Final    MCHC 10/21/2022 34.1  31.5 - 35.7 g/dL Final    RDW 10/21/2022 14.4  11.6 - 15.4 % Final    PLATELET 44/93/5916 476  150 - 450 x10E3/uL Final    NEUTROPHILS 10/21/2022 75  Not Estab. % Final    Lymphocytes 10/21/2022 14  Not Estab. % Final    MONOCYTES 10/21/2022 8  Not Estab. % Final    EOSINOPHILS 10/21/2022 1  Not Estab. % Final    BASOPHILS 10/21/2022 1  Not Estab. % Final    ABS.  NEUTROPHILS 10/21/2022 7.8 (A)  1.4 - 7.0 x10E3/uL Final    Abs Lymphocytes 10/21/2022 1.5  0.7 - 3.1 x10E3/uL Final ABS. MONOCYTES 10/21/2022 0.8  0.1 - 0.9 x10E3/uL Final    ABS. EOSINOPHILS 10/21/2022 0.1  0.0 - 0.4 x10E3/uL Final    ABS. BASOPHILS 10/21/2022 0.1  0.0 - 0.2 x10E3/uL Final    IMMATURE GRANULOCYTES 10/21/2022 1  Not Estab. % Final    ABS. IMM. GRANS. 10/21/2022 0.1  0.0 - 0.1 x10E3/uL Final    Glucose 10/21/2022 154 (A)  70 - 99 mg/dL Final    BUN 10/21/2022 12  6 - 24 mg/dL Final    Creatinine 10/21/2022 0.83  0.76 - 1.27 mg/dL Final    eGFR 10/21/2022 101  >59 mL/min/1.73 Final    BUN/Creatinine ratio 10/21/2022 14  9 - 20 Final    Sodium 10/21/2022 137  134 - 144 mmol/L Final    Potassium 10/21/2022 4.0  3.5 - 5.2 mmol/L Final    Chloride 10/21/2022 99  96 - 106 mmol/L Final    CO2 10/21/2022 23  20 - 29 mmol/L Final    Calcium 10/21/2022 8.7  8.7 - 10.2 mg/dL Final    Protein, total 10/21/2022 7.2  6.0 - 8.5 g/dL Final    Albumin 10/21/2022 4.4  3.8 - 4.9 g/dL Final    GLOBULIN, TOTAL 10/21/2022 2.8  1.5 - 4.5 g/dL Final    A-G Ratio 10/21/2022 1.6  1.2 - 2.2 Final    Bilirubin, total 10/21/2022 0.5  0.0 - 1.2 mg/dL Final    Alk. phosphatase 10/21/2022 112  44 - 121 IU/L Final    AST (SGOT) 10/21/2022 34  0 - 40 IU/L Final    ALT (SGPT) 10/21/2022 43  0 - 44 IU/L Final    Cholesterol, total 10/21/2022 139  100 - 199 mg/dL Final    Triglyceride 10/21/2022 44  0 - 149 mg/dL Final    HDL Cholesterol 10/21/2022 85  >39 mg/dL Final    VLDL, calculated 10/21/2022 10  5 - 40 mg/dL Final    LDL, calculated 10/21/2022 44  0 - 99 mg/dL Final    HCV Ab 10/21/2022 <0.1  0.0 - 0.9 s/co ratio Final    HCV Interpretation 10/21/2022 Comment   Final    Comment: Negative  Not infected with HCV, unless recent infection is suspected or other  evidence exists to indicate HCV infection. TSH 10/21/2022 1.150  0.450 - 4.500 uIU/mL Final    Prostate Specific Ag 10/21/2022 0.2  0.0 - 4.0 ng/mL Final    Comment: Roche ECLIA methodology.   According to the American Urological Association, Serum PSA should  decrease and remain at undetectable levels after radical  prostatectomy. The AUA defines biochemical recurrence as an initial  PSA value 0.2 ng/mL or greater followed by a subsequent confirmatory  PSA value 0.2 ng/mL or greater. Values obtained with different assay methods or kits cannot be used  interchangeably. Results cannot be interpreted as absolute evidence  of the presence or absence of malignant disease. INTERPRETATION 10/21/2022 Note   Final    Supplemental report is available. Past Surgical History:   Procedure Laterality Date    HX HERNIA REPAIR          Past Medical History:   Diagnosis Date    GERD (gastroesophageal reflux disease)     Hx of ulcer disease         Current Outpatient Medications   Medication Instructions    amLODIPine (NORVASC) 5 mg, Oral, DAILY    amoxicillin-clavulanate (AUGMENTIN) 875-125 mg per tablet 1 Tablet, Oral, EVERY 12 HOURS    escitalopram oxalate (LEXAPRO) 5 mg, Oral, DAILY    famotidine (PEPCID) 20 mg tablet TAKE 1 TABLET BY MOUTH TWICE DAILY FOR 15 DAYS    lisinopriL (PRINIVIL, ZESTRIL) 10 mg, Oral, DAILY    mupirocin (BACTROBAN) 2 % ointment Topical, 2 TIMES DAILY        Assessment/Plan:     The above diagnosis is a chronic problem. We discussed expected course, resolution, and complications of diagnosis in detail. I advised him to call back or return to office if symptoms worsen/change/persist.        ICD-10-CM ICD-9-CM    1. Primary hypertension  I10 401.9 AMB POC EKG ROUTINE W/ 12 LEADS, INTER & REP      2. Rash  R21 782.1 REFERRAL TO DERMATOLOGY         Return in about 6 days (around 12/7/2022). An electronic signature was used to authenticate this note.   -- Jurgen Keita NP

## 2022-12-01 NOTE — TELEPHONE ENCOUNTER
----- Message from [address] Ebbie Smita sent at 12/1/2022  1:28 PM EST -----  Regarding: FW: Consults for HTN control  Hello! Please schedule when able. Let's see if we can get him in a little sooner. 45 mins -60 mins for HTN management. In office please! Bring in all medications. Thank you!  ----- Message -----  From: Jasvir Musa NP  Sent: 12/1/2022   1:23 PM EST  To: Pamela Cuevas, JUDYD  Subject: Consults for HTN control                         Hello,    This is Phoodeez JUANI at [facility]. I have put several consults in to you for managing HTN. I just was wondering if I placed the order correctly as I have not heard back from the pharmacy team. The above patient is the latest with stubborn blood pressure. Look forward to hearing from you soon.      Regards,    Phoodeez JUANI

## 2022-12-01 NOTE — PROGRESS NOTES
Chief Complaint   Patient presents with    Elevated Blood Pressure     Follow up to discuss medications

## 2022-12-01 NOTE — PATIENT INSTRUCTIONS
Please take the amlodipine and lisinopril at night. Take the Lexapro and Pepcid in the am.     Please take your blood pressure and if not better in 160's/90's go the the ER. Go sooner for any vision changes numbness tingling weakness only on one side     Antibiotic ointment and pills prescribed for rash as possibly impetigo. Return to the clinic for recheck 12/7/2022.     Regards,    Chelly Liz JUANI
No, not prescribed...

## 2022-12-02 NOTE — TELEPHONE ENCOUNTER
Sister Robbie Ruth states that there has only been a referral to behavior health and she doesn't know anything about an appt with a pharmacist.

## 2022-12-07 ENCOUNTER — OFFICE VISIT (OUTPATIENT)
Dept: INTERNAL MEDICINE CLINIC | Age: 62
End: 2022-12-07
Payer: COMMERCIAL

## 2022-12-07 VITALS
WEIGHT: 159 LBS | SYSTOLIC BLOOD PRESSURE: 143 MMHG | DIASTOLIC BLOOD PRESSURE: 86 MMHG | HEART RATE: 64 BPM | RESPIRATION RATE: 20 BRPM | BODY MASS INDEX: 21.54 KG/M2 | HEIGHT: 72 IN | TEMPERATURE: 97.9 F | OXYGEN SATURATION: 98 %

## 2022-12-07 DIAGNOSIS — R21 RASH: ICD-10-CM

## 2022-12-07 DIAGNOSIS — I10 PRIMARY HYPERTENSION: Primary | ICD-10-CM

## 2022-12-07 PROCEDURE — 99212 OFFICE O/P EST SF 10 MIN: CPT | Performed by: NURSE PRACTITIONER

## 2022-12-07 RX ORDER — AMLODIPINE BESYLATE 10 MG/1
10 TABLET ORAL DAILY
Qty: 90 TABLET | Refills: 0 | Status: SHIPPED | OUTPATIENT
Start: 2022-12-07 | End: 2023-03-07

## 2022-12-07 RX ORDER — ESCITALOPRAM OXALATE 5 MG/1
10 TABLET ORAL DAILY
Qty: 180 TABLET | Refills: 0 | Status: SHIPPED | OUTPATIENT
Start: 2022-12-07 | End: 2023-03-07

## 2022-12-07 RX ORDER — LISINOPRIL 10 MG/1
10 TABLET ORAL DAILY
Qty: 30 TABLET | Refills: 2 | Status: SHIPPED | OUTPATIENT
Start: 2022-12-07 | End: 2023-03-07

## 2022-12-07 NOTE — PROGRESS NOTES
Noemi Rapp (: 1960) is a 63 y.o. male is here for evaluation of the following chief complaint(s): Blood Pressure Check, Rash (Re check rash to buttock area), and Sleep Problem        Subjective/Objective:   Galilea Son was seen today for Blood Pressure Check, Rash (Re check rash to buttock area), and Sleep Problem  Pt blood pressure noted to be improved. Pt states his JOHNSON is better. Pt has not finished his Augmentin but feels like rash is better. Pt denies any other concerns except facilitating referrals and medication refills. Review of Systems   Constitutional: Negative. HENT: Negative. Eyes: Negative. Respiratory: Negative. Cardiovascular: Negative. Gastrointestinal: Negative. Genitourinary: Negative. Musculoskeletal: Negative. Skin:  Positive for rash. Neurological: Negative. Endo/Heme/Allergies: Negative. Psychiatric/Behavioral: Negative. Physical Exam  Vitals reviewed. Constitutional:       General: He is not in acute distress. Appearance: Normal appearance. He is normal weight. He is not ill-appearing. HENT:      Head: Normocephalic and atraumatic. Right Ear: External ear normal.      Left Ear: External ear normal.      Nose: Nose normal.      Mouth/Throat:      Mouth: Mucous membranes are moist.   Eyes:      Conjunctiva/sclera: Conjunctivae normal.   Cardiovascular:      Rate and Rhythm: Normal rate and regular rhythm. Pulses: Normal pulses. Heart sounds: Normal heart sounds. Pulmonary:      Effort: Pulmonary effort is normal.      Breath sounds: Normal breath sounds. Abdominal:      Palpations: Abdomen is soft. Musculoskeletal:         General: Normal range of motion. Cervical back: Normal range of motion. Skin:     General: Skin is warm and dry. Capillary Refill: Capillary refill takes less than 2 seconds. Findings: Rash present.       Comments: Rash to buttock improved with less redness and healing small areas on buttock. No drainage. Neurological:      General: No focal deficit present. Mental Status: He is alert and oriented to person, place, and time. Psychiatric:         Attention and Perception: Attention and perception normal.         Mood and Affect: Mood and affect normal.         Speech: Speech normal.         Behavior: Behavior normal.         Thought Content: Thought content normal.         Cognition and Memory: Cognition normal.         Judgment: Judgment normal.      Comments: Needshelp from family members to remember appt and medications. Needs to be driven and pt reminded to put pills in pill minder. BP (!) 143/86 (BP 1 Location: Right arm, BP Patient Position: Sitting, BP Cuff Size: Large adult)   Pulse 64   Temp 97.9 °F (36.6 °C) (Oral)   Resp 20   Ht 6' (1.829 m)   Wt 159 lb (72.1 kg)   SpO2 98%   BMI 21.56 kg/m²      Office Visit on 10/21/2022   Component Date Value Ref Range Status    WBC 10/21/2022 10.3  3.4 - 10.8 x10E3/uL Final    RBC 10/21/2022 4.28  4.14 - 5.80 x10E6/uL Final    HGB 10/21/2022 12.9 (A)  13.0 - 17.7 g/dL Final    HCT 10/21/2022 37.8  37.5 - 51.0 % Final    MCV 10/21/2022 88  79 - 97 fL Final    MCH 10/21/2022 30.1  26.6 - 33.0 pg Final    MCHC 10/21/2022 34.1  31.5 - 35.7 g/dL Final    RDW 10/21/2022 14.4  11.6 - 15.4 % Final    PLATELET 63/17/4058 741  150 - 450 x10E3/uL Final    NEUTROPHILS 10/21/2022 75  Not Estab. % Final    Lymphocytes 10/21/2022 14  Not Estab. % Final    MONOCYTES 10/21/2022 8  Not Estab. % Final    EOSINOPHILS 10/21/2022 1  Not Estab. % Final    BASOPHILS 10/21/2022 1  Not Estab. % Final    ABS. NEUTROPHILS 10/21/2022 7.8 (A)  1.4 - 7.0 x10E3/uL Final    Abs Lymphocytes 10/21/2022 1.5  0.7 - 3.1 x10E3/uL Final    ABS. MONOCYTES 10/21/2022 0.8  0.1 - 0.9 x10E3/uL Final    ABS. EOSINOPHILS 10/21/2022 0.1  0.0 - 0.4 x10E3/uL Final    ABS.  BASOPHILS 10/21/2022 0.1  0.0 - 0.2 x10E3/uL Final    IMMATURE GRANULOCYTES 10/21/2022 1  Not Estab. % Final    ABS. IMM. GRANS. 10/21/2022 0.1  0.0 - 0.1 x10E3/uL Final    Glucose 10/21/2022 154 (A)  70 - 99 mg/dL Final    BUN 10/21/2022 12  6 - 24 mg/dL Final    Creatinine 10/21/2022 0.83  0.76 - 1.27 mg/dL Final    eGFR 10/21/2022 101  >59 mL/min/1.73 Final    BUN/Creatinine ratio 10/21/2022 14  9 - 20 Final    Sodium 10/21/2022 137  134 - 144 mmol/L Final    Potassium 10/21/2022 4.0  3.5 - 5.2 mmol/L Final    Chloride 10/21/2022 99  96 - 106 mmol/L Final    CO2 10/21/2022 23  20 - 29 mmol/L Final    Calcium 10/21/2022 8.7  8.7 - 10.2 mg/dL Final    Protein, total 10/21/2022 7.2  6.0 - 8.5 g/dL Final    Albumin 10/21/2022 4.4  3.8 - 4.9 g/dL Final    GLOBULIN, TOTAL 10/21/2022 2.8  1.5 - 4.5 g/dL Final    A-G Ratio 10/21/2022 1.6  1.2 - 2.2 Final    Bilirubin, total 10/21/2022 0.5  0.0 - 1.2 mg/dL Final    Alk. phosphatase 10/21/2022 112  44 - 121 IU/L Final    AST (SGOT) 10/21/2022 34  0 - 40 IU/L Final    ALT (SGPT) 10/21/2022 43  0 - 44 IU/L Final    Cholesterol, total 10/21/2022 139  100 - 199 mg/dL Final    Triglyceride 10/21/2022 44  0 - 149 mg/dL Final    HDL Cholesterol 10/21/2022 85  >39 mg/dL Final    VLDL, calculated 10/21/2022 10  5 - 40 mg/dL Final    LDL, calculated 10/21/2022 44  0 - 99 mg/dL Final    HCV Ab 10/21/2022 <0.1  0.0 - 0.9 s/co ratio Final    HCV Interpretation 10/21/2022 Comment   Final    Comment: Negative  Not infected with HCV, unless recent infection is suspected or other  evidence exists to indicate HCV infection. TSH 10/21/2022 1.150  0.450 - 4.500 uIU/mL Final    Prostate Specific Ag 10/21/2022 0.2  0.0 - 4.0 ng/mL Final    Comment: Roche ECLIA methodology. According to the American Urological Association, Serum PSA should  decrease and remain at undetectable levels after radical  prostatectomy. The AUA defines biochemical recurrence as an initial  PSA value 0.2 ng/mL or greater followed by a subsequent confirmatory  PSA value 0.2 ng/mL or greater.   Values obtained with different assay methods or kits cannot be used  interchangeably. Results cannot be interpreted as absolute evidence  of the presence or absence of malignant disease. INTERPRETATION 10/21/2022 Note   Final    Supplemental report is available. Past Surgical History:   Procedure Laterality Date    HX HERNIA REPAIR          Past Medical History:   Diagnosis Date    GERD (gastroesophageal reflux disease)     Hx of ulcer disease         Current Outpatient Medications   Medication Instructions    amLODIPine (NORVASC) 10 mg, Oral, DAILY    amoxicillin-clavulanate (AUGMENTIN) 875-125 mg per tablet 1 Tablet, Oral, EVERY 12 HOURS    escitalopram oxalate (LEXAPRO) 10 mg, Oral, DAILY    famotidine (PEPCID) 20 mg tablet TAKE 1 TABLET BY MOUTH TWICE DAILY FOR 15 DAYS    lisinopriL (PRINIVIL, ZESTRIL) 10 mg, Oral, DAILY    mupirocin (BACTROBAN) 2 % ointment Topical, 2 TIMES DAILY        Assessment/Plan:     The above diagnosis is a chronic problem. We discussed expected course, resolution, and complications of diagnosis in detail. I advised him to call back or return to office if symptoms worsen/change/persist.        ICD-10-CM ICD-9-CM    1. Primary hypertension  I10 401.9       2. Rash  R21 782.1          Return in about 4 weeks (around 1/4/2023). An electronic signature was used to authenticate this note.   -- Lisseth Samayoa NP

## 2022-12-07 NOTE — PROGRESS NOTES
Chief Complaint   Patient presents with    Blood Pressure Check    Rash     Re check rash to buttock area    Sleep Problem

## 2022-12-07 NOTE — PATIENT INSTRUCTIONS
Please continue taking your medications as discussed. Follow up with [facility]. Return in 1 month Sooner for any new or worsening symptoms.     Duane Holt NP

## 2022-12-08 ENCOUNTER — TELEPHONE (OUTPATIENT)
Dept: INTERNAL MEDICINE CLINIC | Age: 62
End: 2022-12-08

## 2022-12-08 NOTE — TELEPHONE ENCOUNTER
Called patients sister Nora Henning back and notified her that I have faxed the referral for Dermatology to [phone] - with office visit notes dated 11/23/2022 - confirmation of receipt received  Clare Mcdaniel LPN  76/2/8377  2:70 AM

## 2022-12-08 NOTE — TELEPHONE ENCOUNTER
----- Message from [facility] sent at 12/7/2022  2:55 PM EST -----  Subject: Message to Provider    QUESTIONS  Information for Provider? Pt sister name Desmond Prater said she needs   her brother referral to dermatology refax to the office that was put in   today by Dr. Fonda Gosselin They did not give her another fax number and she   can not make the appointment with the referral. Please call sister back at   [phone].  ---------------------------------------------------------------------------  --------------  [address]  [phone]; OK to leave message on voicemail  ---------------------------------------------------------------------------  --------------  SCRIPT ANSWERS  Relationship to Patient? Sibling  Representative Name? Jose Fitch  Is the [facility] Life on the appropriate HIPAA document in Epic?  Yes

## 2022-12-14 ENCOUNTER — HOSPITAL ENCOUNTER (OUTPATIENT)
Dept: MRI IMAGING | Age: 62
Discharge: HOME | End: 2022-12-14
Attending: NURSE PRACTITIONER
Payer: COMMERCIAL

## 2022-12-14 DIAGNOSIS — M79.601 RIGHT ARM PAIN: ICD-10-CM

## 2022-12-14 PROCEDURE — 73218 MRI UPPER EXTREMITY W/O DYE: CPT

## 2022-12-22 ENCOUNTER — PATIENT MESSAGE (OUTPATIENT)
Dept: INTERNAL MEDICINE CLINIC | Age: 62
End: 2022-12-22

## 2022-12-22 DIAGNOSIS — T14.8XXA TENDON TEAR: Primary | ICD-10-CM

## 2022-12-22 DIAGNOSIS — S46.219A BICEPS TENDON TEAR: Primary | ICD-10-CM

## 2022-12-23 RX ORDER — ESCITALOPRAM OXALATE 10 MG/1
10 TABLET ORAL DAILY
Qty: 90 TABLET | Refills: 0 | Status: SHIPPED | OUTPATIENT
Start: 2022-12-23 | End: 2023-03-23

## 2022-12-23 NOTE — TELEPHONE ENCOUNTER
Spoke with patient. Lexapro refilled and sent to [facility] as requested. MRI shoulder ordered and orthopedic referral made. Pt verbalized understanding.

## 2023-01-09 ENCOUNTER — OFFICE VISIT (OUTPATIENT)
Dept: INTERNAL MEDICINE CLINIC | Age: 63
End: 2023-01-09
Payer: COMMERCIAL

## 2023-01-09 VITALS
HEART RATE: 62 BPM | TEMPERATURE: 98.6 F | BODY MASS INDEX: 21.24 KG/M2 | RESPIRATION RATE: 20 BRPM | OXYGEN SATURATION: 97 % | DIASTOLIC BLOOD PRESSURE: 86 MMHG | SYSTOLIC BLOOD PRESSURE: 128 MMHG | WEIGHT: 156.8 LBS | HEIGHT: 72 IN

## 2023-01-09 DIAGNOSIS — I10 PRIMARY HYPERTENSION: ICD-10-CM

## 2023-01-09 DIAGNOSIS — R21 RASH: ICD-10-CM

## 2023-01-09 DIAGNOSIS — M25.512 LEFT SHOULDER PAIN, UNSPECIFIED CHRONICITY: Primary | ICD-10-CM

## 2023-01-09 DIAGNOSIS — F41.9 ANXIETY: ICD-10-CM

## 2023-01-09 PROCEDURE — 99214 OFFICE O/P EST MOD 30 MIN: CPT | Performed by: NURSE PRACTITIONER

## 2023-01-09 RX ORDER — MUPIROCIN 20 MG/G
OINTMENT TOPICAL 2 TIMES DAILY
COMMUNITY
End: 2023-01-09 | Stop reason: SDUPTHER

## 2023-01-09 RX ORDER — MUPIROCIN 20 MG/G
OINTMENT TOPICAL 2 TIMES DAILY
Qty: 22 G | Refills: 0 | Status: SHIPPED | OUTPATIENT
Start: 2023-01-09 | End: 2023-01-09 | Stop reason: SDUPTHER

## 2023-01-09 RX ORDER — ESCITALOPRAM OXALATE 10 MG/1
10 TABLET ORAL DAILY
Qty: 90 TABLET | Refills: 0 | Status: SHIPPED | OUTPATIENT
Start: 2023-01-09 | End: 2023-01-18 | Stop reason: SDUPTHER

## 2023-01-09 RX ORDER — MUPIROCIN 20 MG/G
OINTMENT TOPICAL 2 TIMES DAILY
Qty: 22 G | Refills: 0 | Status: SHIPPED | OUTPATIENT
Start: 2023-01-09 | End: 2023-01-23

## 2023-01-09 RX ORDER — LISINOPRIL 10 MG/1
10 TABLET ORAL DAILY
Qty: 30 TABLET | Refills: 2 | Status: SHIPPED | OUTPATIENT
Start: 2023-01-09 | End: 2023-04-09

## 2023-01-09 RX ORDER — AMLODIPINE BESYLATE 10 MG/1
10 TABLET ORAL DAILY
Qty: 90 TABLET | Refills: 0 | Status: SHIPPED | OUTPATIENT
Start: 2023-01-09 | End: 2023-04-09

## 2023-01-09 NOTE — PATIENT INSTRUCTIONS
Please have MRI of left shoulder as well as right. Keep using the cream to rash. Blood pressure has improved and at goal.     Have a great day.      Ivon Gordon, NP

## 2023-01-09 NOTE — PROGRESS NOTES
Jimmie Nuñez (: 1960) is a 63 y.o. male is here for evaluation of the following chief complaint(s): Anxiety (Refill all meds to [facility] mail order) and Rash (Follow up)        Subjective/Objective:   Francesca Lancaster was seen today for Anxiety (Refill all meds to [facility] mail order) and Rash (Follow up)    Pt states rash to buttocks has improved. Prescription for cream refilled. Pt has MRI of right shoulder tomorrow. Pt reports over compensating with his left arm and the left shoulder now is painful. Pt denies weakness numbness tingling, just increased pain. Will refill medications as requested. Review of Systems   Constitutional: Negative. HENT: Negative. Eyes: Negative. Respiratory: Negative. Cardiovascular: Negative. Gastrointestinal: Negative. Genitourinary: Negative. Musculoskeletal:  Positive for joint pain. Negative for falls. Skin:  Positive for rash. Negative for itching. Neurological: Negative. Endo/Heme/Allergies: Negative. Psychiatric/Behavioral:  Negative for depression, hallucinations, memory loss, substance abuse and suicidal ideas. The patient is nervous/anxious. The patient does not have insomnia. Physical Exam  Constitutional:       General: He is not in acute distress. Appearance: Normal appearance. He is normal weight. He is not ill-appearing. HENT:      Head: Normocephalic and atraumatic. Right Ear: External ear normal.      Left Ear: External ear normal.      Nose: Nose normal.      Mouth/Throat:      Mouth: Mucous membranes are moist.   Eyes:      Conjunctiva/sclera: Conjunctivae normal.   Cardiovascular:      Rate and Rhythm: Normal rate and regular rhythm. Pulses: Normal pulses. Heart sounds: Normal heart sounds. Pulmonary:      Effort: Pulmonary effort is normal.      Breath sounds: Normal breath sounds. Abdominal:      Palpations: Abdomen is soft.    Musculoskeletal:      Right shoulder: Swelling, tenderness and bony tenderness present. No deformity, effusion, laceration or crepitus. Decreased range of motion. Normal strength. Normal pulse. Cervical back: Normal range of motion. Right lower leg: No edema. Left lower leg: No edema. Comments: Pt has difficulty raising arm to side and back. Skin:     General: Skin is warm and dry. Capillary Refill: Capillary refill takes less than 2 seconds. Findings: Rash present. Comments: Rash to buttocks improved. Less red and sores healing. No drainage. Neurological:      General: No focal deficit present. Mental Status: He is alert and oriented to person, place, and time. Psychiatric:         Attention and Perception: Attention and perception normal.         Mood and Affect: Affect normal. Mood is anxious. Speech: Speech normal.         Behavior: Behavior normal. Behavior is cooperative. Thought Content:  Thought content normal.         Cognition and Memory: Cognition and memory normal.         Judgment: Judgment normal.        /86 (BP 1 Location: Right arm, BP Patient Position: Sitting, BP Cuff Size: Large adult)   Pulse 62   Temp 98.6 °F (37 °C) (Temporal)   Resp 20   Ht 6' (1.829 m)   Wt 156 lb 12.8 oz (71.1 kg)   SpO2 97%   BMI 21.27 kg/m²      Office Visit on 10/21/2022   Component Date Value Ref Range Status    WBC 10/21/2022 10.3  3.4 - 10.8 x10E3/uL Final    RBC 10/21/2022 4.28  4.14 - 5.80 x10E6/uL Final    HGB 10/21/2022 12.9 (A)  13.0 - 17.7 g/dL Final    HCT 10/21/2022 37.8  37.5 - 51.0 % Final    MCV 10/21/2022 88  79 - 97 fL Final    MCH 10/21/2022 30.1  26.6 - 33.0 pg Final    MCHC 10/21/2022 34.1  31.5 - 35.7 g/dL Final    RDW 10/21/2022 14.4  11.6 - 15.4 % Final    PLATELET 52/00/9113 393  150 - 450 x10E3/uL Final    NEUTROPHILS 10/21/2022 75  Not Estab. % Final    Lymphocytes 10/21/2022 14  Not Estab. % Final    MONOCYTES 10/21/2022 8  Not Estab. % Final    EOSINOPHILS 10/21/2022 1  Not Estab. % Final    BASOPHILS 10/21/2022 1  Not Estab. % Final    ABS. NEUTROPHILS 10/21/2022 7.8 (A)  1.4 - 7.0 x10E3/uL Final    Abs Lymphocytes 10/21/2022 1.5  0.7 - 3.1 x10E3/uL Final    ABS. MONOCYTES 10/21/2022 0.8  0.1 - 0.9 x10E3/uL Final    ABS. EOSINOPHILS 10/21/2022 0.1  0.0 - 0.4 x10E3/uL Final    ABS. BASOPHILS 10/21/2022 0.1  0.0 - 0.2 x10E3/uL Final    IMMATURE GRANULOCYTES 10/21/2022 1  Not Estab. % Final    ABS. IMM. GRANS. 10/21/2022 0.1  0.0 - 0.1 x10E3/uL Final    Glucose 10/21/2022 154 (A)  70 - 99 mg/dL Final    BUN 10/21/2022 12  6 - 24 mg/dL Final    Creatinine 10/21/2022 0.83  0.76 - 1.27 mg/dL Final    eGFR 10/21/2022 101  >59 mL/min/1.73 Final    BUN/Creatinine ratio 10/21/2022 14  9 - 20 Final    Sodium 10/21/2022 137  134 - 144 mmol/L Final    Potassium 10/21/2022 4.0  3.5 - 5.2 mmol/L Final    Chloride 10/21/2022 99  96 - 106 mmol/L Final    CO2 10/21/2022 23  20 - 29 mmol/L Final    Calcium 10/21/2022 8.7  8.7 - 10.2 mg/dL Final    Protein, total 10/21/2022 7.2  6.0 - 8.5 g/dL Final    Albumin 10/21/2022 4.4  3.8 - 4.9 g/dL Final    GLOBULIN, TOTAL 10/21/2022 2.8  1.5 - 4.5 g/dL Final    A-G Ratio 10/21/2022 1.6  1.2 - 2.2 Final    Bilirubin, total 10/21/2022 0.5  0.0 - 1.2 mg/dL Final    Alk. phosphatase 10/21/2022 112  44 - 121 IU/L Final    AST (SGOT) 10/21/2022 34  0 - 40 IU/L Final    ALT (SGPT) 10/21/2022 43  0 - 44 IU/L Final    Cholesterol, total 10/21/2022 139  100 - 199 mg/dL Final    Triglyceride 10/21/2022 44  0 - 149 mg/dL Final    HDL Cholesterol 10/21/2022 85  >39 mg/dL Final    VLDL, calculated 10/21/2022 10  5 - 40 mg/dL Final    LDL, calculated 10/21/2022 44  0 - 99 mg/dL Final    HCV Ab 10/21/2022 <0.1  0.0 - 0.9 s/co ratio Final    HCV Interpretation 10/21/2022 Comment   Final    Comment: Negative  Not infected with HCV, unless recent infection is suspected or other  evidence exists to indicate HCV infection.       TSH 10/21/2022 1.150  0.450 - 4.500 uIU/mL Final    Prostate Specific Ag 10/21/2022 0.2  0.0 - 4.0 ng/mL Final    Comment: Roche ECLIA methodology. According to the American Urological Association, Serum PSA should  decrease and remain at undetectable levels after radical  prostatectomy. The AUA defines biochemical recurrence as an initial  PSA value 0.2 ng/mL or greater followed by a subsequent confirmatory  PSA value 0.2 ng/mL or greater. Values obtained with different assay methods or kits cannot be used  interchangeably. Results cannot be interpreted as absolute evidence  of the presence or absence of malignant disease. INTERPRETATION 10/21/2022 Note   Final    Supplemental report is available. Past Surgical History:   Procedure Laterality Date    HX HERNIA REPAIR          Past Medical History:   Diagnosis Date    GERD (gastroesophageal reflux disease)     Hx of ulcer disease         Current Outpatient Medications   Medication Instructions    amLODIPine (NORVASC) 10 mg, Oral, DAILY    escitalopram oxalate (LEXAPRO) 10 mg, Oral, DAILY    famotidine (PEPCID) 20 mg tablet TAKE 1 TABLET BY MOUTH TWICE DAILY FOR 15 DAYS    lisinopriL (PRINIVIL, ZESTRIL) 10 mg, Oral, DAILY    mupirocin (BACTROBAN) 2 % ointment Topical, 2 TIMES DAILY        Assessment/Plan:     The above diagnosis is a chronic problem. We discussed expected course, resolution, and complications of diagnosis in detail. I advised him to call back or return to office if symptoms worsen/change/persist.        ICD-10-CM ICD-9-CM    1. Left shoulder pain, unspecified chronicity  M25.512 719.41 MRI SHOULDER LT WO CONT      2. Primary hypertension  I10 401.9       3. Rash  R21 782.1       4. Anxiety  F41.9 300.00 CANCELED: REFERRAL TO BEHAVIORAL HEALTH         Return in about 3 months (around 4/9/2023). An electronic signature was used to authenticate this note.   -- Christel Rosa NP

## 2023-01-09 NOTE — PROGRESS NOTES
Chief Complaint   Patient presents with    Anxiety     Refill all meds to [facility] mail order    Rash     Follow up

## 2023-01-10 ENCOUNTER — HOSPITAL ENCOUNTER (OUTPATIENT)
Dept: MRI IMAGING | Age: 63
Discharge: HOME | End: 2023-01-10
Attending: NURSE PRACTITIONER
Payer: COMMERCIAL

## 2023-01-10 DIAGNOSIS — M25.512 LEFT SHOULDER PAIN, UNSPECIFIED CHRONICITY: ICD-10-CM

## 2023-01-10 DIAGNOSIS — T14.8XXA TENDON TEAR: ICD-10-CM

## 2023-01-10 PROCEDURE — 73221 MRI JOINT UPR EXTREM W/O DYE: CPT

## 2023-01-11 ENCOUNTER — DOCUMENTATION ONLY (OUTPATIENT)
Dept: INTERNAL MEDICINE CLINIC | Age: 63
End: 2023-01-11

## 2023-01-11 ENCOUNTER — TELEPHONE (OUTPATIENT)
Dept: INTERNAL MEDICINE CLINIC | Age: 63
End: 2023-01-11

## 2023-01-11 DIAGNOSIS — R93.89 ABNORMAL MRI: Primary | ICD-10-CM

## 2023-01-11 NOTE — TELEPHONE ENCOUNTER
Called the office of Dr Benjie Segal. Reports they have an opening for 1530 today or tomorrow afternoon. Called patient and made him aware. Called pt sister and left message to call office.

## 2023-01-11 NOTE — TELEPHONE ENCOUNTER
Spoke with sister who was aware on MRI result. She states they have an appt on 1/23/2023 with Dr Darshan Rosario with [facility]. Will make that office aware of the MRI results. CT chest with contrast ordered .

## 2023-01-11 NOTE — TELEPHONE ENCOUNTER
Spoke with Irineo Zavala with Dr Fortino [facility]cia office and [facility] will fax MRI report from yesterday to their office.

## 2023-01-13 ENCOUNTER — TELEPHONE (OUTPATIENT)
Dept: INTERNAL MEDICINE CLINIC | Age: 63
End: 2023-01-13

## 2023-01-13 NOTE — TELEPHONE ENCOUNTER
----- Message from [facility] sent at 1/11/2023  2:28 PM EST -----  Subject: Refill Request    QUESTIONS  Name of Medication? escitalopram oxalate (LEXAPRO) 10 mg tablet  Patient-reported dosage and instructions? 10 mg daily  How many days do you have left? 12  Preferred Pharmacy? [facility] phone number (if available)? [phone]  ---------------------------------------------------------------------------  --------------  Chino ZAMORANO  What is the best way for the office to contact you? OK to leave message on   voicemail  Preferred Call Back Phone Number? [phone]  ---------------------------------------------------------------------------  --------------  SCRIPT ANSWERS  Relationship to Patient?  Self

## 2023-01-17 ENCOUNTER — HOSPITAL ENCOUNTER (OUTPATIENT)
Dept: CT IMAGING | Age: 63
Discharge: HOME | End: 2023-01-17
Attending: NURSE PRACTITIONER
Payer: COMMERCIAL

## 2023-01-17 DIAGNOSIS — R93.89 ABNORMAL MRI: ICD-10-CM

## 2023-01-17 PROCEDURE — 74011000636 HC RX REV CODE- 636: Performed by: NURSE PRACTITIONER

## 2023-01-17 PROCEDURE — 71260 CT THORAX DX C+: CPT

## 2023-01-17 RX ADMIN — IOPAMIDOL 100 ML: 612 INJECTION, SOLUTION INTRAVENOUS at 11:19

## 2023-01-18 RX ORDER — ESCITALOPRAM OXALATE 10 MG/1
10 TABLET ORAL DAILY
Qty: 90 TABLET | Refills: 0 | Status: SHIPPED | OUTPATIENT
Start: 2023-01-18 | End: 2023-04-18

## 2023-02-07 ENCOUNTER — ANESTHESIA EVENT (OUTPATIENT)
Dept: ENDOSCOPY | Age: 63
End: 2023-02-07
Payer: COMMERCIAL

## 2023-02-07 ENCOUNTER — ANESTHESIA (OUTPATIENT)
Dept: ENDOSCOPY | Age: 63
End: 2023-02-07
Payer: COMMERCIAL

## 2023-02-07 ENCOUNTER — HOSPITAL ENCOUNTER (OUTPATIENT)
Age: 63
Setting detail: OUTPATIENT SURGERY
Discharge: HOME | End: 2023-02-07
Attending: INTERNAL MEDICINE | Admitting: INTERNAL MEDICINE
Payer: COMMERCIAL

## 2023-02-07 VITALS
WEIGHT: 149.7 LBS | RESPIRATION RATE: 11 BRPM | BODY MASS INDEX: 20.28 KG/M2 | OXYGEN SATURATION: 99 % | SYSTOLIC BLOOD PRESSURE: 143 MMHG | TEMPERATURE: 97.9 F | HEART RATE: 63 BPM | DIASTOLIC BLOOD PRESSURE: 84 MMHG | HEIGHT: 72 IN

## 2023-02-07 PROCEDURE — 2709999900 HC NON-CHARGEABLE SUPPLY: Performed by: INTERNAL MEDICINE

## 2023-02-07 PROCEDURE — 74011250636 HC RX REV CODE- 250/636: Performed by: INTERNAL MEDICINE

## 2023-02-07 PROCEDURE — 74011250637 HC RX REV CODE- 250/637: Performed by: INTERNAL MEDICINE

## 2023-02-07 PROCEDURE — 74011250636 HC RX REV CODE- 250/636: Performed by: NURSE ANESTHETIST, CERTIFIED REGISTERED

## 2023-02-07 PROCEDURE — 76040000007: Performed by: INTERNAL MEDICINE

## 2023-02-07 PROCEDURE — 76060000032 HC ANESTHESIA 0.5 TO 1 HR: Performed by: INTERNAL MEDICINE

## 2023-02-07 PROCEDURE — 77030019988 HC FCPS ENDOSC DISP BSC -B: Performed by: INTERNAL MEDICINE

## 2023-02-07 PROCEDURE — 88305 TISSUE EXAM BY PATHOLOGIST: CPT

## 2023-02-07 PROCEDURE — 74011000250 HC RX REV CODE- 250: Performed by: NURSE ANESTHETIST, CERTIFIED REGISTERED

## 2023-02-07 RX ORDER — ATROPINE SULFATE 0.1 MG/ML
0.5 INJECTION INTRAVENOUS
Status: DISCONTINUED | OUTPATIENT
Start: 2023-02-07 | End: 2023-02-07 | Stop reason: HOSPADM

## 2023-02-07 RX ORDER — MIDAZOLAM HYDROCHLORIDE 1 MG/ML
.25-5 INJECTION, SOLUTION INTRAMUSCULAR; INTRAVENOUS
Status: DISCONTINUED | OUTPATIENT
Start: 2023-02-07 | End: 2023-02-07 | Stop reason: HOSPADM

## 2023-02-07 RX ORDER — DIPHENHYDRAMINE HYDROCHLORIDE 50 MG/ML
50 INJECTION, SOLUTION INTRAMUSCULAR; INTRAVENOUS ONCE
Status: DISCONTINUED | OUTPATIENT
Start: 2023-02-07 | End: 2023-02-07 | Stop reason: HOSPADM

## 2023-02-07 RX ORDER — DEXTROMETHORPHAN/PSEUDOEPHED 2.5-7.5/.8
1.2 DROPS ORAL
Status: DISCONTINUED | OUTPATIENT
Start: 2023-02-07 | End: 2023-02-07 | Stop reason: HOSPADM

## 2023-02-07 RX ORDER — EPINEPHRINE 0.1 MG/ML
1 INJECTION INTRACARDIAC; INTRAVENOUS
Status: DISCONTINUED | OUTPATIENT
Start: 2023-02-07 | End: 2023-02-07 | Stop reason: HOSPADM

## 2023-02-07 RX ORDER — SODIUM CHLORIDE 9 MG/ML
125 INJECTION, SOLUTION INTRAVENOUS CONTINUOUS
Status: DISCONTINUED | OUTPATIENT
Start: 2023-02-07 | End: 2023-02-07 | Stop reason: HOSPADM

## 2023-02-07 RX ORDER — LIDOCAINE HYDROCHLORIDE 20 MG/ML
INJECTION, SOLUTION EPIDURAL; INFILTRATION; INTRACAUDAL; PERINEURAL AS NEEDED
Status: DISCONTINUED | OUTPATIENT
Start: 2023-02-07 | End: 2023-02-07 | Stop reason: HOSPADM

## 2023-02-07 RX ORDER — FLUMAZENIL 0.1 MG/ML
0.2 INJECTION INTRAVENOUS
Status: DISCONTINUED | OUTPATIENT
Start: 2023-02-07 | End: 2023-02-07 | Stop reason: HOSPADM

## 2023-02-07 RX ORDER — NALOXONE HYDROCHLORIDE 0.4 MG/ML
0.4 INJECTION, SOLUTION INTRAMUSCULAR; INTRAVENOUS; SUBCUTANEOUS
Status: DISCONTINUED | OUTPATIENT
Start: 2023-02-07 | End: 2023-02-07 | Stop reason: HOSPADM

## 2023-02-07 RX ORDER — PROPOFOL 10 MG/ML
INJECTION, EMULSION INTRAVENOUS AS NEEDED
Status: DISCONTINUED | OUTPATIENT
Start: 2023-02-07 | End: 2023-02-07 | Stop reason: HOSPADM

## 2023-02-07 RX ADMIN — Medication 80 MG: at 14:06

## 2023-02-07 RX ADMIN — LIDOCAINE HYDROCHLORIDE 60 MG: 20 INJECTION, SOLUTION EPIDURAL; INFILTRATION; INTRACAUDAL; PERINEURAL at 13:28

## 2023-02-07 RX ADMIN — PROPOFOL 50 MG: 10 INJECTION, EMULSION INTRAVENOUS at 13:30

## 2023-02-07 RX ADMIN — PROPOFOL 50 MG: 10 INJECTION, EMULSION INTRAVENOUS at 13:42

## 2023-02-07 RX ADMIN — PROPOFOL 50 MG: 10 INJECTION, EMULSION INTRAVENOUS at 13:31

## 2023-02-07 RX ADMIN — PROPOFOL 50 MG: 10 INJECTION, EMULSION INTRAVENOUS at 13:28

## 2023-02-07 RX ADMIN — PROPOFOL 50 MG: 10 INJECTION, EMULSION INTRAVENOUS at 13:48

## 2023-02-07 RX ADMIN — PROPOFOL 50 MG: 10 INJECTION, EMULSION INTRAVENOUS at 13:34

## 2023-02-07 RX ADMIN — PROPOFOL 50 MG: 10 INJECTION, EMULSION INTRAVENOUS at 13:36

## 2023-02-07 RX ADMIN — SODIUM CHLORIDE: 0.9 INJECTION, SOLUTION INTRAVENOUS at 13:21

## 2023-02-07 RX ADMIN — PROPOFOL 50 MG: 10 INJECTION, EMULSION INTRAVENOUS at 13:40

## 2023-02-07 RX ADMIN — PROPOFOL 50 MG: 10 INJECTION, EMULSION INTRAVENOUS at 13:29

## 2023-02-07 RX ADMIN — PROPOFOL 50 MG: 10 INJECTION, EMULSION INTRAVENOUS at 13:38

## 2023-02-07 RX ADMIN — PROPOFOL 50 MG: 10 INJECTION, EMULSION INTRAVENOUS at 13:33

## 2023-02-07 NOTE — PROCEDURES
Colonoscopy and EGD Procedure Note    : Miguelito Vincent MD    Staff: Endoscopy Technician-1: Andres Vasquez  Endoscopy RN-1: Arie Wilkinson RN; Deonte Shrestha RN    Referring Provider: Whitley Kwon NP    Sedation:  MAC anesthesia Propofol    Procedure Details:  After informed consent was obtained with all risks and benefits of procedure explained and pre-operative exam completed, pt was placed in the left lateral decubitus position. Following sequential administration of sedation as per above, the gastroscope was inserted into the mouth and advanced under direct vision to third portion of the duodenum. A careful inspection was made as the gastroscope was withdrawn, including a retroflexed view of the proximal stomach; findings and interventions are described below. EGD Findings:  Esophagus:Normal proximal and mid esophagus. EGJ at 36cm and irregular with C1M2 possible Johnson's extending proximally to 34cm with isolated area of grade C esophagitis, biopsied cold forceps [place] protocol four quadrants minimal bleeding. Large hiatal hernia extending from 36cm to 42cm with few small erosions and hematin no intervenable lesions. Hill-4 GEFV. Stomach:diffuser erythema and hematin in stomach without overt erosions or ulceration, biopsied cold forceps with minimal bleeding antrum, incisura, body, cardia, to r/o H pylori. Duodenum/jejunum: Multiple duodenal bulb clean-based ulcers maximum 5mm and scalloping and atrophy of the mucosa and villous folds, biopsied extensively cold forceps to r/o celiac    The bed was then turned and upon sequential sedation as per above, a digital rectal exam was performed per below. The Olympus videocolonoscope was inserted in the rectum and carefully advanced to the cecum, which was identified by the ileocecal valve and appendiceal orifice. The quality of preparation was inadequate BPS 1/1/1 3.   The colonoscope was slowly withdrawn with careful evaluation between folds. Retroflexion in the rectum was performed. Colon Findings:   ROGELIO: unremarkable  Entire colon filled with semiliquid and semisolid stool unable to lavage adequately. Cecum without mass. Procedure terminated. Specimens Removed:    ID Type Source Tests Collected by Time Destination   1 : duodenum biopsies Preservative Duodenum  Marissa Wang MD 2/7/2023 1338 Pathology   2 : stomach    Marissa Wang MD 2/7/2023 1340 Pathology       Complications: None. EBL:  minimal    Impression:    See Postoperative diagnosis above    Recommendations:   - If biopsies were obtained, await pathology. You should receive a letter within 2 weeks. - Resume normal medications.  - Recommend repeat colonoscopy in 6mo double prep protocol  - Repeat EGD in 6mo  - Rxd omeprazole 40mg BID 30mins before breakfast and dinner to [facility] per [facility] request. Discussed at length with sister Hermann Weaver. Discharge Disposition:  Home in the company of a  when able to ambulate.     Parmjit Chambers MD  2/7/2023  1:54 PM

## 2023-02-07 NOTE — PERIOP NOTES
Endoscopy Case End Note:    1355:  Procedure scope was pre-cleaned, per protocol, at bedside by HOMA      1355:  Report received from anesthesia Lanie Rucker CRNA. See anesthesia flowsheet for intra-procedure vital signs and events.

## 2023-02-07 NOTE — PERIOP NOTES
1328-The risks and benefits of the bite block have been explained to patient. Patient verbalizes understanding.

## 2023-02-07 NOTE — DISCHARGE INSTRUCTIONS
Cindy Castillo  [id number]  1960    It was my pleasure seeing you for your procedure. You will also receive a summary report with the findings from this procedure and any further recommendations. If you had polyps removed or biopsies taken during your procedure, you will receive a separate letter from me within the next 2 weeks. If you don't receive this letter or if you have any questions, please call my office [phone]. Please take note of the post procedure instructions listed below. Best Wishes,    Dr. Kierra Cam    These instructions give you information on caring for yourself after your procedure. Call your doctor if you have any problems or questions after your procedure. HOME CARE  Walk if you have belly cramping or gas. Walking will help get rid of the air and reduce the bloated feeling in your belly (abdomen). Your IV site (where you received drugs) may be tender to touch. Place warm towels on the site; keep your arm up on two pillows if you have any swelling or soreness in the area. You may shower. ACTIVITY:  Take frequent rest periods and move at a slower pace for the next 24 hours. .  You may resume your regular activity tomorrow if you are feeling back to normal.  Do not drive or ride a bicycle for at least 24 hours (because of the medicine (anesthesia) used during the test). Do not sign any important legal documents or use or operate any machinery for 24 hours  Do not take sleeping medicines/nerve drugs for 24 hours unless the doctor tells you. You can return to work/school tomorrow unless otherwise instructed. NUTRITION:  Drink plenty of fluids to keep your pee (urine) clear or pale yellow  Begin with a light meal and progress to your normal diet. Heavy or fried foods are harder to digest and may make you feel sick to your stomach (nauseated).   Once you are feeling back to normal, you may resume your normal diet as instructed by your doctor. Avoid alcoholic beverages for 24 hours or as instructed. IF YOU HAD BIOPSIES TAKEN OR POLYPS REMOVED DURING THE PROCEDURE:  For the next 7 days, avoid all non-steroidal antiinflammatory medications such as Ibuprofen, Motrin, Advil, Alleve, Ifeoma-seltzer, Goody's powder, BC powder. If you do not have an heart condition that requires you to take a daily aspirin, you should avoid taking aspirin for 7 days. Eat a soft diet for 24 hours. Monitor your stools for any blood or dark black, tar-like, stools as this may be a sign of bleeding and if you see any blood, notify your doctor immediately. GET HELP RIGHT AWAY AND SEEK IMMEDIATE MEDICAL CARE IF:  You have more than a spotting of blood in your stool. You pass clumps of tissue (blood clots) or fill the toilet with blood. Your belly is painfully swollen or puffy (abdominal distention). You throw up (vomit). You have a fever. You have redness, pain or swelling at the IV site that last greater than two days. You have abdominal pain or discomfort that is severe or gets worse throughout the day. Post-procedure diagnosis:  EGD-duodenitis, duodenal ulcers, gastritis, Hiatal hernia, esophagitis,   Colon- inadequate prep    Post-procedure recommendations:          Learning About Coronavirus (COVID-19)  Coronavirus (COVID-19): Overview  What is coronavirus (COVID-19)? The coronavirus disease (COVID-19) is caused by a virus. It is an illness that was first found in [place], [place], in December 2019. It has since spread worldwide. The virus can cause fever, cough, and trouble breathing. In severe cases, it can cause pneumonia and make it hard to breathe without help. It can cause death. Coronaviruses are a large group of viruses. They cause the common cold. They also cause more serious illnesses like [place] East respiratory syndrome (MERS) and severe acute respiratory syndrome (SARS).  COVID-19 is caused by a novel coronavirus. That means it's a new type that has not been seen in people before. This virus spreads person-to-person through droplets from coughing and sneezing. It can also spread when you are close to someone who is infected. And it can spread when you touch something that has the virus on it, such as a doorknob or a tabletop. What can you do to protect yourself from coronavirus (COVID-19)? The best way to protect yourself from getting sick is to: Avoid areas where there is an outbreak. Avoid contact with people who may be infected. Wash your hands often with soap or alcohol-based hand sanitizers. Avoid crowds and try to stay at least 6 feet away from other people. Wash your hands often, especially after you cough or sneeze. Use soap and water, and scrub for at least 20 seconds. If soap and water aren't available, use an alcohol-based hand . Avoid touching your mouth, nose, and eyes. What can you do to avoid spreading the virus to others? To help avoid spreading the virus to others:  Cover your mouth with a tissue when you cough or sneeze. Then throw the tissue in the trash. Use a disinfectant to clean things that you touch often. Stay home if you are sick or have been exposed to the virus. Don't go to school, work, or public areas. And don't use public transportation. If you are sick:  Leave your home only if you need to get medical care. But call the doctor's office first so they know you're coming. And wear a face mask, if you have one. If you have a face mask, wear it whenever you're around other people. It can help stop the spread of the virus when you cough or sneeze. Clean and disinfect your home every day. Use household  and disinfectant wipes or sprays. Take special care to clean things that you grab with your hands. These include doorknobs, remote controls, phones, and handles on your refrigerator and microwave.  And don't forget countertops, tabletops, bathrooms, and computer keyboards. When to call for help  Call 911 anytime you think you may need emergency care. For example, call if:  You have severe trouble breathing. (You can't talk at all.)  You have constant chest pain or pressure. You are severely dizzy or lightheaded. You are confused or can't think clearly. Your face and lips have a blue color. You pass out (lose consciousness) or are very hard to wake up. Call your doctor now if you develop symptoms such as:  Shortness of breath. Fever. Cough. If you need to get care, call ahead to the doctor's office for instructions before you go. Make sure you wear a face mask, if you have one, to prevent exposing other people to the virus. Where can you get the latest information? The following health organizations are tracking and studying this virus. Their websites contain the most up-to-date information. Lluvia Katz also learn what to do if you think you may have been exposed to the virus. U.S. Centers for Disease Control and Prevention (CDC): The CDC provides updated news about the disease and travel advice. The website also tells you how to prevent the spread of infection. www.cdc.gov  World Health Organization [facility]): WHO offers information about the virus outbreaks. WHO also has travel advice. www.who.int  Current as of: April 1, 2020               Content Version: 12.4  © 2460-9765 [employer]. Care instructions adapted under license by your healthcare professional. If you have questions about a medical condition or this instruction, always ask your healthcare professional. [place] [address] any warranty or liability for your use of this information.              Patient Education on Sedation / Analgesia Administered for Procedure      For 24 hours after general anesthesia or intravenous analgesia / sedation:  Have someone responsible help you with your care  Limit your activities  Do not drive and operate hazardous machinery  Do not make important personal, legal or business decisions  Do not drink alcoholic beverages  If you have not urinated within 8 hours after discharge, please contact your physician  Resume your medications unless otherwise instructed    For 24 hours after general anesthesia or intravenous analgesia / sedation  you may experience:  Drowsiness, dizziness, sleepiness, or confusion  Difficulty remembering or delayed reaction times  Difficulty with your balance, especially while walking, move slowly and carefully, do not make sudden position changes  Difficulty focusing or blurred vision    You may not be aware of slight changes in your behavior and/or your reaction time because of the medication used during and after your procedure.     Report the following to your physician:  Excessive pain, swelling, redness or odor of or around the surgical area  Temperature over 100.5  Nausea and vomiting lasting longer than 4 hours or if unable to take medications  Any signs of decreased circulation or nerve impairment to extremity: change in color, persistent numbness, tingling, coldness or increase pain  Any questions or concerns    IF YOU REPORT TO AN EMERGENCY ROOM, DOCTOR'S OFFICE OR HOSPITAL WITHIN 24 HOURS AFTER YOUR PROCEDURE, BRING THIS SHEET AND YOUR AFTER VISIT SUMMARY WITH YOU AND GIVE IT TO THE PHYSICIAN OR NURSE ATTENDING YOU.

## 2023-02-07 NOTE — H&P
[facility]  Outpatient History and Physical    Patient: Cyn Zohreh    Physician: Daljit Ace MD    Vital Signs: There were no vitals taken for this visit. Allergies:   No Known Allergies    Chief Complaint: Mr. Ankita Murillo with HTN, hx/o bleeding gastric ulcer due to NSIADs 2014 Miller Baseman requiring ex-lap now on pepcid, inguinal hernia s/p repair MCV, mild anemia 12.4 no iron studies, with screening colonoscopy NO fhx/o colon cancer (previously reported brother in late 46s but with discussions with family it was confirmed PROSTATE cancer). Will proceed with EGD & Colonoscopy to evaluate. History:  Past Medical History:   Diagnosis Date    GERD (gastroesophageal reflux disease)     Hx of ulcer disease       Past Surgical History:   Procedure Laterality Date    HX HERNIA REPAIR        Social History     Socioeconomic History    Marital status: SINGLE   Tobacco Use    Smoking status: Never    Smokeless tobacco: Never   Vaping Use    Vaping Use: Never used   Substance and Sexual Activity    Alcohol use: Not Currently    Drug use: Not Currently     Types: Marijuana     Comment: gets it from a friend    Sexual activity: Not Currently      Family History   Problem Relation Age of Onset    Diabetes Mother     Cancer Mother     Ovarian Cancer Mother     Heart Attack Father     Cancer Brother     Colon Cancer Brother        Medications:   Prior to Admission medications    Medication Sig Start Date End Date Taking? Authorizing Provider   escitalopram oxalate (LEXAPRO) 10 mg tablet Take 1 Tablet by mouth daily for 90 days. 1/18/23 4/18/23 Yes Jorge Duque NP   amLODIPine (NORVASC) 10 mg tablet Take 1 Tablet by mouth daily for 90 days. 1/9/23 4/9/23 Yes Jorge Duque NP   lisinopriL (PRINIVIL, ZESTRIL) 10 mg tablet Take 1 Tablet by mouth daily for 90 days.  1/9/23 4/9/23 Yes Jorge Duque NP   famotidine (PEPCID) 20 mg tablet TAKE 1 TABLET BY MOUTH TWICE DAILY FOR 15 DAYS 11/7/22  Yes Provider, Historical       Physical Exam:   General: alert, no distress   HEENT: Head: Normocephalic, no lesions, without obvious abnormality. Heart: regular rate and rhythm, S1, S2 normal, no murmur, click, rub or gallop   Lungs: chest clear, no wheezing, rales, normal symmetric air entry   Abdominal: Bowel sounds are normal, liver is not enlarged, spleen is not enlarged   Neurological: Grossly normal   Extremities: extremities normal, atraumatic, no cyanosis or edema     Plan of Care/Planned Procedure: egd / colonoscopy    The heart, lungs and mental status were satisfactory for the administration of MAC sedation and for the procedure. Informed consent was obtained for the procedure, including sedation. Risks of perforation, hemorrhage, adverse drug reaction, and aspiration were discussed. The risks, benefits and alternatives were again reiterated to the patient to include the risk of infection, bleeding, medication reaction, aspiration, perforation which could require immediate surgery, cardiopulmonary complication, issues with anesthesia and death. The patient was counseled at length about the risks of jana Covid-19 in the deedee-operative and post-operative states including the recovery window of their procedure. The patient was made aware that jana Covid-19 after a surgical procedure may worsen their prognosis for recovering from the virus and lend to a higher morbidity and or mortality risk. The patient was given the options of postponing their procedure. All of the risks, benefits, and alternatives were discussed. The patient does  wish to proceed with the procedure.

## 2023-02-07 NOTE — ANESTHESIA POSTPROCEDURE EVALUATION
Procedure(s):  ESOPHAGOGASTRODUODENOSCOPY (EGD)  COLONOSCOPY  ESOPHAGOGASTRODUODENAL (EGD) BIOPSY. MAC    Anesthesia Post Evaluation        Patient location during evaluation: PACU  Note status: Adequate. Level of consciousness: responsive to verbal stimuli and sleepy but conscious  Pain management: satisfactory to patient  Airway patency: patent  Anesthetic complications: no  Cardiovascular status: acceptable  Respiratory status: acceptable  Hydration status: acceptable  Comments: +Post-Anesthesia Evaluation and Assessment    Patient: Michele Fitzgerald MRN: [id number]  SSN: [id number]   YOB: 1960  Age: 63 y.o. Sex: male      Cardiovascular Function/Vital Signs    /82   Pulse (!) 55   Temp 36.7 °C (98 °F)   Resp 17   Ht 6' (1.829 m)   Wt 67.9 kg (149 lb 11.2 oz)   SpO2 97%   BMI 20.30 kg/m²     Patient is status post Procedure(s):  ESOPHAGOGASTRODUODENOSCOPY (EGD)  COLONOSCOPY  ESOPHAGOGASTRODUODENAL (EGD) BIOPSY. Nausea/Vomiting: Controlled. Postoperative hydration reviewed and adequate. Pain:  Pain Scale 1: Numeric (0 - 10) (02/07/23 1402)  Pain Intensity 1: 0 (02/07/23 1402)   Managed. Neurological Status: At baseline. Mental Status and Level of Consciousness: Arousable. Pulmonary Status:   O2 Device: None (Room air) (02/07/23 1410)   Adequate oxygenation and airway patent. Complications related to anesthesia: None    Post-anesthesia assessment completed. No concerns. Signed By: Saji Ha MD    2/7/2023  Post anesthesia nausea and vomiting:  controlled      INITIAL Post-op Vital signs:   Vitals Value Taken Time   BP     Temp     Pulse 54 02/07/23 1417   Resp 18 02/07/23 1417   SpO2 98 % 02/07/23 1417   Vitals shown include unvalidated device data.

## 2023-02-07 NOTE — ANESTHESIA PREPROCEDURE EVALUATION
Relevant Problems   CARDIOVASCULAR   (+) Hypertension       Anesthetic History   No history of anesthetic complications            Review of Systems / Medical History  Patient summary reviewed, nursing notes reviewed and pertinent labs reviewed    Pulmonary  Within defined limits                 Neuro/Psych   Within defined limits           Cardiovascular    Hypertension              Exercise tolerance: >4 METS  Comments: ECG (12/1/22): Sinus  Bradycardia   WITHIN NORMAL LIMITS   GI/Hepatic/Renal     GERD: poorly controlled      PUD    Comments: Chronic gastric ulcer without hemorrhage or perforation   Chronic heartburn   Chronic idiopathic constipation   Family history of colon cancer     Hx Hernia repair Endo/Other        Anemia (Hgb = 12.9 on 10/21/22)     Other Findings            Physical Exam    Airway  Mallampati: II  TM Distance: > 6 cm  Neck ROM: normal range of motion   Mouth opening: Normal     Cardiovascular  Regular rate and rhythm,  S1 and S2 normal,  no murmur, click, rub, or gallop             Dental    Dentition: Poor dentition  Comments: Multiple missing and broken teeth, none loose.    Pulmonary  Breath sounds clear to auscultation               Abdominal  GI exam deferred       Other Findings            Anesthetic Plan    ASA: 2  Anesthesia type: MAC          Induction: Intravenous  Anesthetic plan and risks discussed with: Patient

## 2023-02-07 NOTE — ROUTINE PROCESS
TRANSFER - IN REPORT:    Verbal report received from Robyn(name) on Bartholome Fell  being received from endo(unit) for routine progression of care      Report consisted of patients Situation, Background, Assessment and   Recommendations(SBAR). Information from the following report(s) SBAR, Procedure Summary, and MAR was reviewed with the receiving nurse. Opportunity for questions and clarification was provided. Assessment completed upon patients arrival to unit and care assumed.

## 2023-02-14 ENCOUNTER — OFFICE VISIT (OUTPATIENT)
Dept: INTERNAL MEDICINE CLINIC | Age: 63
End: 2023-02-14
Payer: COMMERCIAL

## 2023-02-14 VITALS
TEMPERATURE: 99.3 F | SYSTOLIC BLOOD PRESSURE: 137 MMHG | DIASTOLIC BLOOD PRESSURE: 88 MMHG | HEART RATE: 65 BPM | WEIGHT: 160 LBS | RESPIRATION RATE: 20 BRPM | HEIGHT: 72 IN | OXYGEN SATURATION: 98 % | BODY MASS INDEX: 21.67 KG/M2

## 2023-02-14 DIAGNOSIS — M25.512 CHRONIC LEFT SHOULDER PAIN: Primary | ICD-10-CM

## 2023-02-14 DIAGNOSIS — G89.29 CHRONIC LEFT SHOULDER PAIN: Primary | ICD-10-CM

## 2023-02-14 DIAGNOSIS — R73.09 ELEVATED GLUCOSE: ICD-10-CM

## 2023-02-14 DIAGNOSIS — F41.9 ANXIETY: ICD-10-CM

## 2023-02-14 PROCEDURE — 99214 OFFICE O/P EST MOD 30 MIN: CPT | Performed by: NURSE PRACTITIONER

## 2023-02-14 RX ORDER — OMEPRAZOLE 40 MG/1
CAPSULE, DELAYED RELEASE ORAL
COMMUNITY
Start: 2023-02-08

## 2023-02-14 RX ORDER — ESCITALOPRAM OXALATE 20 MG/1
20 TABLET ORAL DAILY
Qty: 30 TABLET | Refills: 2 | Status: SHIPPED | OUTPATIENT
Start: 2023-02-14 | End: 2023-05-15

## 2023-02-14 RX ORDER — DICLOFENAC SODIUM 10 MG/G
4 GEL TOPICAL 4 TIMES DAILY
Qty: 4 EACH | Refills: 0 | Status: SHIPPED | OUTPATIENT
Start: 2023-02-14 | End: 2023-03-11

## 2023-02-14 RX ORDER — TRIAMCINOLONE ACETONIDE 1 MG/G
CREAM TOPICAL
COMMUNITY
Start: 2023-01-27

## 2023-02-14 NOTE — PATIENT INSTRUCTIONS
It was a pleasure to see you again. Voltaren gel prescribed for left shoulder pain and increased dose of Lexapro for anxiety. Please return in 3 months for recheck.      Td Vitale, NP

## 2023-02-14 NOTE — PROGRESS NOTES
Chief Complaint   Patient presents with    Anxiety     Follow up to discuss increasing anxiety - new counselor [address] Nancy Dean  [phone] / [phone]    Shoulder Pain     Follow up after ortho visit

## 2023-02-14 NOTE — PROGRESS NOTES
Mary Cervantes (: 1960) is a 63 y.o. male is here for evaluation of the following chief complaint(s): Anxiety (Follow up to discuss increasing anxiety - new counselor Kailey Villaseñor Dr [address] John Garduno  [phone] / [phone]) and Shoulder Pain (Follow up after ortho visit  )        Subjective/Objective:   Lashawn Arrington was seen today for Anxiety (Follow up to discuss increasing anxiety - new counselor Kailey Villaseñor Dr [address] John Garduno  [phone] / [phone]) and Shoulder Pain (Follow up after ortho visit  )  Pt reports having increased anxiety with money issues. Pt's sister is paying some of his living expenses. Pt requesting increased does of Lexapro. Pt also states pain to left shoulder 10/10 but does not appear in pain objectively. Voltaren gel prescribed. Pt cannot use NSAIDS due to inflammation in stomach as seen on EGD. Pt denies n/v/d or blood in stool. Pt taking tylenol occasionally. A1c 5.6 today. Review of Systems   Constitutional: Negative. HENT: Negative. Eyes: Negative. Respiratory: Negative. Cardiovascular: Negative. Gastrointestinal: Negative. Genitourinary: Negative. Musculoskeletal:  Positive for joint pain. Negative for back pain, falls, myalgias and neck pain. Skin: Negative. Neurological: Negative. Endo/Heme/Allergies: Negative. Psychiatric/Behavioral:  Negative for depression, hallucinations, memory loss, substance abuse and suicidal ideas. The patient is nervous/anxious. The patient does not have insomnia. Physical Exam  Vitals reviewed. Constitutional:       General: He is not in acute distress. Appearance: Normal appearance. He is not ill-appearing. HENT:      Head: Normocephalic and atraumatic.       Right Ear: External ear normal.      Left Ear: External ear normal.      Nose: Nose normal.      Mouth/Throat:      Mouth: Mucous membranes are moist.   Eyes: Conjunctiva/sclera: Conjunctivae normal.   Cardiovascular:      Rate and Rhythm: Normal rate and regular rhythm. Pulses: Normal pulses. Heart sounds: Normal heart sounds. Pulmonary:      Effort: Pulmonary effort is normal.      Breath sounds: Normal breath sounds. Abdominal:      Palpations: Abdomen is soft. Musculoskeletal:      Right shoulder: Normal.      Left shoulder: Tenderness and bony tenderness present. No swelling, deformity, effusion, laceration or crepitus. Normal range of motion. Normal strength. Normal pulse. Cervical back: Normal range of motion. Skin:     General: Skin is warm and dry. Capillary Refill: Capillary refill takes less than 2 seconds. Neurological:      General: No focal deficit present. Mental Status: He is alert and oriented to person, place, and time. Mental status is at baseline. Psychiatric:         Attention and Perception: Attention and perception normal.         Mood and Affect: Mood is anxious and depressed. Speech: Speech normal.         Behavior: Behavior normal. Behavior is cooperative. Thought Content:  Thought content normal.         Cognition and Memory: Cognition and memory normal.         Judgment: Judgment normal.        /88 (BP 1 Location: Right arm, BP Patient Position: Sitting, BP Cuff Size: Large adult)   Pulse 65   Temp 99.3 °F (37.4 °C) (Temporal)   Resp 20   Ht 6' (1.829 m)   Wt 160 lb (72.6 kg)   SpO2 98%   BMI 21.70 kg/m²      Office Visit on 10/21/2022   Component Date Value Ref Range Status    WBC 10/21/2022 10.3  3.4 - 10.8 x10E3/uL Final    RBC 10/21/2022 4.28  4.14 - 5.80 x10E6/uL Final    HGB 10/21/2022 12.9 (A)  13.0 - 17.7 g/dL Final    HCT 10/21/2022 37.8  37.5 - 51.0 % Final    MCV 10/21/2022 88  79 - 97 fL Final    MCH 10/21/2022 30.1  26.6 - 33.0 pg Final    MCHC 10/21/2022 34.1  31.5 - 35.7 g/dL Final    RDW 10/21/2022 14.4  11.6 - 15.4 % Final    PLATELET 78/38/3001 373  150 - 450 x10E3/uL Final    NEUTROPHILS 10/21/2022 75  Not Estab. % Final    Lymphocytes 10/21/2022 14  Not Estab. % Final    MONOCYTES 10/21/2022 8  Not Estab. % Final    EOSINOPHILS 10/21/2022 1  Not Estab. % Final    BASOPHILS 10/21/2022 1  Not Estab. % Final    ABS. NEUTROPHILS 10/21/2022 7.8 (A)  1.4 - 7.0 x10E3/uL Final    Abs Lymphocytes 10/21/2022 1.5  0.7 - 3.1 x10E3/uL Final    ABS. MONOCYTES 10/21/2022 0.8  0.1 - 0.9 x10E3/uL Final    ABS. EOSINOPHILS 10/21/2022 0.1  0.0 - 0.4 x10E3/uL Final    ABS. BASOPHILS 10/21/2022 0.1  0.0 - 0.2 x10E3/uL Final    IMMATURE GRANULOCYTES 10/21/2022 1  Not Estab. % Final    ABS. IMM. GRANS. 10/21/2022 0.1  0.0 - 0.1 x10E3/uL Final    Glucose 10/21/2022 154 (A)  70 - 99 mg/dL Final    BUN 10/21/2022 12  6 - 24 mg/dL Final    Creatinine 10/21/2022 0.83  0.76 - 1.27 mg/dL Final    eGFR 10/21/2022 101  >59 mL/min/1.73 Final    BUN/Creatinine ratio 10/21/2022 14  9 - 20 Final    Sodium 10/21/2022 137  134 - 144 mmol/L Final    Potassium 10/21/2022 4.0  3.5 - 5.2 mmol/L Final    Chloride 10/21/2022 99  96 - 106 mmol/L Final    CO2 10/21/2022 23  20 - 29 mmol/L Final    Calcium 10/21/2022 8.7  8.7 - 10.2 mg/dL Final    Protein, total 10/21/2022 7.2  6.0 - 8.5 g/dL Final    Albumin 10/21/2022 4.4  3.8 - 4.9 g/dL Final    GLOBULIN, TOTAL 10/21/2022 2.8  1.5 - 4.5 g/dL Final    A-G Ratio 10/21/2022 1.6  1.2 - 2.2 Final    Bilirubin, total 10/21/2022 0.5  0.0 - 1.2 mg/dL Final    Alk.  phosphatase 10/21/2022 112  44 - 121 IU/L Final    AST (SGOT) 10/21/2022 34  0 - 40 IU/L Final    ALT (SGPT) 10/21/2022 43  0 - 44 IU/L Final    Cholesterol, total 10/21/2022 139  100 - 199 mg/dL Final    Triglyceride 10/21/2022 44  0 - 149 mg/dL Final    HDL Cholesterol 10/21/2022 85  >39 mg/dL Final    VLDL, calculated 10/21/2022 10  5 - 40 mg/dL Final    LDL, calculated 10/21/2022 44  0 - 99 mg/dL Final    HCV Ab 10/21/2022 <0.1  0.0 - 0.9 s/co ratio Final    HCV Interpretation 10/21/2022 Comment   Final Comment: Negative  Not infected with HCV, unless recent infection is suspected or other  evidence exists to indicate HCV infection. TSH 10/21/2022 1.150  0.450 - 4.500 uIU/mL Final    Prostate Specific Ag 10/21/2022 0.2  0.0 - 4.0 ng/mL Final    Comment: Roche ECLIA methodology. According to the American Urological Association, Serum PSA should  decrease and remain at undetectable levels after radical  prostatectomy. The AUA defines biochemical recurrence as an initial  PSA value 0.2 ng/mL or greater followed by a subsequent confirmatory  PSA value 0.2 ng/mL or greater. Values obtained with different assay methods or kits cannot be used  interchangeably. Results cannot be interpreted as absolute evidence  of the presence or absence of malignant disease. INTERPRETATION 10/21/2022 Note   Final    Supplemental report is available. Past Surgical History:   Procedure Laterality Date    COLONOSCOPY N/A 2/7/2023    COLONOSCOPY performed by Maude Espinoza MD at [facility] ENDOSCOPY    HX HERNIA REPAIR          Past Medical History:   Diagnosis Date    GERD (gastroesophageal reflux disease)     Hx of ulcer disease         Current Outpatient Medications   Medication Instructions    amLODIPine (NORVASC) 10 mg, Oral, DAILY    diclofenac (VOLTAREN) 4 g, Topical, 4 TIMES DAILY    escitalopram oxalate (LEXAPRO) 20 mg, Oral, DAILY    famotidine (PEPCID) 20 mg tablet TAKE 1 TABLET BY MOUTH TWICE DAILY FOR 15 DAYS    lisinopriL (PRINIVIL, ZESTRIL) 10 mg, Oral, DAILY    omeprazole (PRILOSEC) 40 mg capsule No dose, route, or frequency recorded. triamcinolone acetonide (KENALOG) 0.1 % topical cream APPLY CREAM EXTERNALLY TO AFFECTED AREA TWICE DAILY        Assessment/Plan:     The above diagnosis is a chronic problem. We discussed expected course, resolution, and complications of diagnosis in detail. I advised him to call back or return to office if symptoms worsen/change/persist.        ICD-10-CM ICD-9-CM    1. Chronic left shoulder pain  M25.512 719.41     G89.29 338.29       2. Elevated glucose  R73.09 790.29 AMB POC HEMOGLOBIN A1C      3. Anxiety  F41.9 300.00          Return in about 3 months (around 5/14/2023). An electronic signature was used to authenticate this note.   -- Asa Pedro NP

## 2023-02-15 ENCOUNTER — TELEPHONE (OUTPATIENT)
Dept: NEUROLOGY | Age: 63
End: 2023-02-15

## 2023-03-02 ENCOUNTER — APPOINTMENT (OUTPATIENT)
Dept: GENERAL RADIOLOGY | Age: 63
End: 2023-03-02
Attending: EMERGENCY MEDICINE
Payer: COMMERCIAL

## 2023-03-02 ENCOUNTER — TELEPHONE (OUTPATIENT)
Dept: INTERNAL MEDICINE CLINIC | Age: 63
End: 2023-03-02

## 2023-03-02 ENCOUNTER — HOSPITAL ENCOUNTER (EMERGENCY)
Age: 63
Discharge: HOME | End: 2023-03-02
Attending: EMERGENCY MEDICINE
Payer: COMMERCIAL

## 2023-03-02 VITALS
RESPIRATION RATE: 12 BRPM | WEIGHT: 160.05 LBS | BODY MASS INDEX: 21.68 KG/M2 | SYSTOLIC BLOOD PRESSURE: 172 MMHG | HEIGHT: 72 IN | DIASTOLIC BLOOD PRESSURE: 91 MMHG | TEMPERATURE: 97.9 F | OXYGEN SATURATION: 99 % | HEART RATE: 58 BPM

## 2023-03-02 DIAGNOSIS — M75.102 TEAR OF LEFT ROTATOR CUFF, UNSPECIFIED TEAR EXTENT, UNSPECIFIED WHETHER TRAUMATIC: Primary | ICD-10-CM

## 2023-03-02 LAB
ALBUMIN SERPL-MCNC: 3.8 G/DL (ref 3.5–5)
ALBUMIN/GLOB SERPL: 1.1 (ref 1.1–2.2)
ALP SERPL-CCNC: 78 U/L (ref 45–117)
ALT SERPL-CCNC: 39 U/L (ref 12–78)
ANION GAP SERPL CALC-SCNC: 3 MMOL/L (ref 5–15)
AST SERPL-CCNC: 24 U/L (ref 15–37)
ATRIAL RATE: 61 BPM
BASOPHILS # BLD: 0.1 K/UL (ref 0–0.1)
BASOPHILS NFR BLD: 1 % (ref 0–1)
BILIRUB SERPL-MCNC: 0.6 MG/DL (ref 0.2–1)
BNP SERPL-MCNC: 428 PG/ML
BUN SERPL-MCNC: 17 MG/DL (ref 6–20)
BUN/CREAT SERPL: 19 (ref 12–20)
CALCIUM SERPL-MCNC: 9.1 MG/DL (ref 8.5–10.1)
CALCULATED P AXIS, ECG09: 63 DEGREES
CALCULATED R AXIS, ECG10: 81 DEGREES
CALCULATED T AXIS, ECG11: 77 DEGREES
CHLORIDE SERPL-SCNC: 103 MMOL/L (ref 97–108)
CO2 SERPL-SCNC: 30 MMOL/L (ref 21–32)
CREAT SERPL-MCNC: 0.9 MG/DL (ref 0.7–1.3)
DIAGNOSIS, 93000: NORMAL
DIFFERENTIAL METHOD BLD: ABNORMAL
EOSINOPHIL # BLD: 0.1 K/UL (ref 0–0.4)
EOSINOPHIL NFR BLD: 1 % (ref 0–7)
ERYTHROCYTE [DISTWIDTH] IN BLOOD BY AUTOMATED COUNT: 15.5 % (ref 11.5–14.5)
GLOBULIN SER CALC-MCNC: 3.6 G/DL (ref 2–4)
GLUCOSE SERPL-MCNC: 181 MG/DL (ref 65–100)
HCT VFR BLD AUTO: 42.4 % (ref 36.6–50.3)
HGB BLD-MCNC: 13.9 G/DL (ref 12.1–17)
IMM GRANULOCYTES # BLD AUTO: 0 K/UL (ref 0–0.04)
IMM GRANULOCYTES NFR BLD AUTO: 1 % (ref 0–0.5)
LYMPHOCYTES # BLD: 1.2 K/UL (ref 0.8–3.5)
LYMPHOCYTES NFR BLD: 14 % (ref 12–49)
MCH RBC QN AUTO: 30 PG (ref 26–34)
MCHC RBC AUTO-ENTMCNC: 32.8 G/DL (ref 30–36.5)
MCV RBC AUTO: 91.4 FL (ref 80–99)
MONOCYTES # BLD: 0.6 K/UL (ref 0–1)
MONOCYTES NFR BLD: 7 % (ref 5–13)
NEUTS SEG # BLD: 6.4 K/UL (ref 1.8–8)
NEUTS SEG NFR BLD: 76 % (ref 32–75)
NRBC # BLD: 0 K/UL (ref 0–0.01)
NRBC BLD-RTO: 0 PER 100 WBC
P-R INTERVAL, ECG05: 152 MS
PLATELET # BLD AUTO: 312 K/UL (ref 150–400)
PMV BLD AUTO: 9.3 FL (ref 8.9–12.9)
POTASSIUM SERPL-SCNC: 4.3 MMOL/L (ref 3.5–5.1)
PROT SERPL-MCNC: 7.4 G/DL (ref 6.4–8.2)
Q-T INTERVAL, ECG07: 442 MS
QRS DURATION, ECG06: 100 MS
QTC CALCULATION (BEZET), ECG08: 444 MS
RBC # BLD AUTO: 4.64 M/UL (ref 4.1–5.7)
SODIUM SERPL-SCNC: 136 MMOL/L (ref 136–145)
TROPONIN I SERPL HS-MCNC: 6 NG/L (ref 0–76)
VENTRICULAR RATE, ECG03: 61 BPM
WBC # BLD AUTO: 8.3 K/UL (ref 4.1–11.1)

## 2023-03-02 PROCEDURE — 84484 ASSAY OF TROPONIN QUANT: CPT

## 2023-03-02 PROCEDURE — 36415 COLL VENOUS BLD VENIPUNCTURE: CPT

## 2023-03-02 PROCEDURE — 83880 ASSAY OF NATRIURETIC PEPTIDE: CPT

## 2023-03-02 PROCEDURE — 85025 COMPLETE CBC W/AUTO DIFF WBC: CPT

## 2023-03-02 PROCEDURE — 93005 ELECTROCARDIOGRAM TRACING: CPT

## 2023-03-02 PROCEDURE — 99285 EMERGENCY DEPT VISIT HI MDM: CPT

## 2023-03-02 PROCEDURE — 71046 X-RAY EXAM CHEST 2 VIEWS: CPT

## 2023-03-02 PROCEDURE — 80053 COMPREHEN METABOLIC PANEL: CPT

## 2023-03-02 RX ORDER — OXYCODONE AND ACETAMINOPHEN 5; 325 MG/1; MG/1
1 TABLET ORAL
Qty: 10 TABLET | Refills: 0 | Status: SHIPPED | OUTPATIENT
Start: 2023-03-02 | End: 2023-03-05

## 2023-03-02 RX ORDER — METHOCARBAMOL 500 MG/1
500 TABLET, FILM COATED ORAL 4 TIMES DAILY
Qty: 56 TABLET | Refills: 0 | Status: SHIPPED | OUTPATIENT
Start: 2023-03-02 | End: 2023-03-16

## 2023-03-02 RX ORDER — POLYETHYLENE GLYCOL 3350 17 G/17G
17 POWDER, FOR SOLUTION ORAL DAILY
COMMUNITY

## 2023-03-02 RX ORDER — OMEPRAZOLE 40 MG/1
40 CAPSULE, DELAYED RELEASE ORAL 2 TIMES DAILY
COMMUNITY

## 2023-03-02 RX ORDER — LIDOCAINE 50 MG/G
PATCH TOPICAL
Qty: 30 EACH | Refills: 0 | Status: SHIPPED | OUTPATIENT
Start: 2023-03-02

## 2023-03-02 NOTE — TELEPHONE ENCOUNTER
Wants Lexapro refill to go to [facility] - request sent to [address] [facility] CORY Barbosa LPN  1/6/4601  6:25 PM

## 2023-03-02 NOTE — ED NOTES
Bedside and Verbal shift change report given to Crowdrallysandra Greer (oncoming nurse) by Carlos (offgoing nurse). Report included the following information SBAR, ED Summary, and MAR.

## 2023-03-02 NOTE — ED PROVIDER NOTES
\[facility]\"" EMERGENCY DEPT  EMERGENCY DEPARTMENT ENCOUNTER       Pt Name: Octaviano Schmitt  MRN: [id number]  [place] 1960  Date of evaluation: 3/2/2023  Provider: Kaye Palacio MD   PCP: Helga Segura NP  Note Started: 6:32 AM 3/2/23     CHIEF COMPLAINT       Chief Complaint   Patient presents with    Shoulder Pain     Ambulatory into triage with cc of left shoulder pain, he tore his rotator cuff dx in January. States pain is radiating into chest    Chest Pain        HISTORY OF PRESENT ILLNESS: 1 or more elements      History From: Patient, sister, History limited by: None     Octaviano Schmitt is a 63 y.o. male who presents with persistent left shoulder pain. He has had left shoulder pain since January, diagnosed with MRI of left shoulder with rotator cuff tear. He saw Dr. Jorge Sewell prescribed meloxicam, subsequently had an EGD showing significant esophagitis and was instructed by Dr. Erin Padilla to avoid NSAIDs. He has been taking topical diclofenac with oral Tylenol without relief to his symptoms. Worsened significantly over the past few days. Reports the pain radiates into his chest, however feels similar to prior related to his rotator cuff injury. He is a day , works with his arms. Nursing Notes were all reviewed and agreed with or any disagreements were addressed in the HPI. REVIEW OF SYSTEMS        Positives and Pertinent negatives as per HPI.     PAST HISTORY     Past Medical History:  Past Medical History:   Diagnosis Date    GERD (gastroesophageal reflux disease)     Hx of ulcer disease        Past Surgical History:  Past Surgical History:   Procedure Laterality Date    COLONOSCOPY N/A 2/7/2023    COLONOSCOPY performed by Ana Maria Fry MD at \[facility]\"" ENDOSCOPY    HX HERNIA REPAIR         Family History:  Family History   Problem Relation Age of Onset    Diabetes Mother     Cancer Mother     Ovarian Cancer Mother     Heart Attack Father     Cancer Brother     Colon Cancer Brother        Social History:  Social History     Tobacco Use    Smoking status: Never    Smokeless tobacco: Never   Vaping Use    Vaping Use: Never used   Substance Use Topics    Alcohol use: Not Currently    Drug use: Not Currently     Types: Marijuana     Comment: gets it from a friend       Allergies:  No Known Allergies    CURRENT MEDICATIONS      Discharge Medication List as of 3/2/2023  7:43 AM        CONTINUE these medications which have NOT CHANGED    Details   omeprazole (PRILOSEC) 40 mg capsule Take 40 mg by mouth two (2) times a day., Historical Med      polyethylene glycol (Miralax) 17 gram/dose powder Take 17 g by mouth daily. , Historical Med      triamcinolone acetonide (KENALOG) 0.1 % topical cream APPLY CREAM EXTERNALLY TO AFFECTED AREA TWICE DAILY, Historical Med      diclofenac (VOLTAREN) 1 % gel Apply 4 g to affected area four (4) times daily for 25 days. , Normal, Disp-4 Each, R-0      escitalopram oxalate (LEXAPRO) 20 mg tablet Take 1 Tablet by mouth daily for 90 days. , Normal, Disp-30 Tablet, R-2      amLODIPine (NORVASC) 10 mg tablet Take 1 Tablet by mouth daily for 90 days. , Normal, Disp-90 Tablet, R-0      lisinopriL (PRINIVIL, ZESTRIL) 10 mg tablet Take 1 Tablet by mouth daily for 90 days. , Normal, Disp-30 Tablet, R-2             SCREENINGS               No data recorded         PHYSICAL EXAM      ED Triage Vitals   ED Encounter Vitals Group      BP 03/02/23 0535 (!) 164/100      Pulse (Heart Rate) 03/02/23 0535 63      Resp Rate 03/02/23 0535 16      Temp 03/02/23 0535 97.9 °F (36.6 °C)      Temp src --       O2 Sat (%) 03/02/23 0535 100 %      Weight 03/02/23 0535 160 lb      Height 03/02/23 0547 6'        Physical Exam  Vitals and nursing note reviewed. Constitutional:       Appearance: He is well-developed. Cardiovascular:      Rate and Rhythm: Regular rhythm. Bradycardia present. Heart sounds: Normal heart sounds.    Pulmonary:      Effort: Pulmonary effort is normal. Musculoskeletal:      Right lower leg: No edema. Left lower leg: No edema. Comments: Significant pain with passive and active range of motion of left shoulder in all directions particularly with flexion. No deformity. Well-perfused distal with 2+ symmetric radial pulses, sensation intact to light touch throughout left arm. Neurological:      Mental Status: He is alert. DIAGNOSTIC RESULTS   LABS:  Recent Results (from the past 12 hour(s))   EKG, 12 LEAD, INITIAL    Collection Time: 03/02/23  5:37 AM   Result Value Ref Range    Ventricular Rate 61 BPM    Atrial Rate 61 BPM    P-R Interval 152 ms    QRS Duration 100 ms    Q-T Interval 442 ms    QTC Calculation (Bezet) 444 ms    Calculated P Axis 63 degrees    Calculated R Axis 81 degrees    Calculated T Axis 77 degrees    Diagnosis       Normal sinus rhythm  Minimal voltage criteria for LVH, may be normal variant  No previous ECGs available     CBC WITH AUTOMATED DIFF    Collection Time: 03/02/23  5:47 AM   Result Value Ref Range    WBC 8.3 4.1 - 11.1 K/uL    RBC 4.64 4.10 - 5.70 M/uL    HGB 13.9 12.1 - 17.0 g/dL    HCT 42.4 36.6 - 50.3 %    MCV 91.4 80.0 - 99.0 FL    MCH 30.0 26.0 - 34.0 PG    MCHC 32.8 30.0 - 36.5 g/dL    RDW 15.5 (H) 11.5 - 14.5 %    PLATELET 176 982 - 040 K/uL    MPV 9.3 8.9 - 12.9 FL    NRBC 0.0 0  WBC    ABSOLUTE NRBC 0.00 0.00 - 0.01 K/uL    NEUTROPHILS 76 (H) 32 - 75 %    LYMPHOCYTES 14 12 - 49 %    MONOCYTES 7 5 - 13 %    EOSINOPHILS 1 0 - 7 %    BASOPHILS 1 0 - 1 %    IMMATURE GRANULOCYTES 1 (H) 0.0 - 0.5 %    ABS. NEUTROPHILS 6.4 1.8 - 8.0 K/UL    ABS. LYMPHOCYTES 1.2 0.8 - 3.5 K/UL    ABS. MONOCYTES 0.6 0.0 - 1.0 K/UL    ABS. EOSINOPHILS 0.1 0.0 - 0.4 K/UL    ABS. BASOPHILS 0.1 0.0 - 0.1 K/UL    ABS. IMM.  GRANS. 0.0 0.00 - 0.04 K/UL    DF AUTOMATED     METABOLIC PANEL, COMPREHENSIVE    Collection Time: 03/02/23  5:47 AM   Result Value Ref Range    Sodium 136 136 - 145 mmol/L    Potassium 4.3 3.5 - 5.1 mmol/L Chloride 103 97 - 108 mmol/L    CO2 30 21 - 32 mmol/L    Anion gap 3 (L) 5 - 15 mmol/L    Glucose 181 (H) 65 - 100 mg/dL    BUN 17 6 - 20 MG/DL    Creatinine 0.90 0.70 - 1.30 MG/DL    BUN/Creatinine ratio 19 12 - 20      eGFR >60 >60 ml/min/1.73m2    Calcium 9.1 8.5 - 10.1 MG/DL    Bilirubin, total 0.6 0.2 - 1.0 MG/DL    ALT (SGPT) 39 12 - 78 U/L    AST (SGOT) 24 15 - 37 U/L    Alk. phosphatase 78 45 - 117 U/L    Protein, total 7.4 6.4 - 8.2 g/dL    Albumin 3.8 3.5 - 5.0 g/dL    Globulin 3.6 2.0 - 4.0 g/dL    A-G Ratio 1.1 1.1 - 2.2     NT-PRO BNP    Collection Time: 03/02/23  5:47 AM   Result Value Ref Range    NT pro- (H) <125 PG/ML   TROPONIN-HIGH SENSITIVITY    Collection Time: 03/02/23  5:47 AM   Result Value Ref Range    Troponin-High Sensitivity 6 0 - 76 ng/L        EKG: If performed, independent interpretation documented below in the MDM section     RADIOLOGY:  Non-plain film images such as CT, Ultrasound and MRI are read by the radiologist. Plain radiographic images are visualized and preliminarily interpreted by the ED Provider with the findings documented in the MDM section. Interpretation per the Radiologist below, if available at the time of this note:     XR CHEST PA LAT    Result Date: 3/2/2023  INDICATION:   Chest Pain COMPARISON: None FINDINGS: Frontal and lateral views of the chest demonstrate a normal cardiomediastinal silhouette. The lungs are adequately expanded. There is no edema, effusion, consolidation, or pneumothorax. The osseous structures are unremarkable. No acute process.        PROCEDURES   Unless otherwise noted below, none  Procedures     CRITICAL CARE TIME       EMERGENCY DEPARTMENT COURSE and DIFFERENTIAL DIAGNOSIS/MDM   Vitals:    Vitals:    03/02/23 0535 03/02/23 0547 03/02/23 0547   BP: (!) 164/100  (!) 172/91   Pulse: 63  (!) 58   Resp: 16  12   Temp: 97.9 °F (36.6 °C)  97.9 °F (36.6 °C)   SpO2: 100%  99%   Weight: 72.6 kg (160 lb) 72.6 kg (160 lb 0.9 oz) Height:  6' (1.829 m)      Patient was given the following medications:  Medications - No data to display    Medical Decision Making  DDx flare of rotator cuff tear, consider ACS however low suspicion    We will obtain chest x-ray, EKG troponin CBC CMP. Will escalate therapies at home with Robaxin, oxycodone in addition topical diclofenac and Tylenol. Instructed follow-up with his orthopedic surgeon for ongoing management. Amount and/or Complexity of Data Reviewed  Labs: ordered. Decision-making details documented in ED Course. Radiology: ordered and independent interpretation performed. Decision-making details documented in ED Course. ECG/medicine tests: ordered. Risk  Prescription drug management. ED Course as of 03/02/23 1455   Thu Mar 02, 2023   0629 CBC shows normal counts, chest x-ray shows no acute process [WB]   0629 No EKG shows normal sinus rhythm at a rate of 61 normal axis, normal intervals, no STEMI or peak T waves [WB]   0743 When compared with EKG from December 2022, today's EKG is unchanged [WB]      ED Course User Index  [WB] Greg Cardozo MD     FINAL IMPRESSION     1. Tear of left rotator cuff, unspecified tear extent, unspecified whether traumatic          DISPOSITION/PLAN   Minh Saeed's  results have been reviewed with him. He has been counseled regarding his diagnosis, treatment, and plan. He verbally conveys understanding and agreement of the signs, symptoms, diagnosis, treatment and prognosis and additionally agrees to follow up as discussed. He also agrees with the care-plan and conveys that all of his questions have been answered. I have also provided discharge instructions for him that include: educational information regarding their diagnosis and treatment, and list of reasons why they would want to return to the ED prior to their follow-up appointment, should his condition change.      CLINICAL IMPRESSION    Discharge Note: The patient is stable for discharge home. The signs, symptoms, diagnosis, and discharge instructions have been discussed, understanding conveyed, and agreed upon. The patient is to follow up as recommended or return to ER should their symptoms worsen. PATIENT REFERRED TO:  Follow-up Information       Follow up With Specialties Details Why Contact Info    Rey Ac MD Orthopedic Surgery Schedule an appointment as soon as possible for a visit   [address] [address]  [address] [address] [phone]                DISCHARGE MEDICATIONS:  Discharge Medication List as of 3/2/2023  7:43 AM        START taking these medications    Details   methocarbamoL (ROBAXIN) 500 mg tablet Take 1 Tablet by mouth four (4) times daily for 14 days. , Normal, Disp-56 Tablet, R-0      lidocaine (LIDODERM) 5 % Apply patch to the affected area for 12 hours a day and remove for 12 hours a day., Normal, Disp-30 Each, R-0      oxyCODONE-acetaminophen (Percocet) 5-325 mg per tablet Take 1 Tablet by mouth every six (6) hours as needed for Pain for up to 3 days. Max Daily Amount: 4 Tablets., Normal, Disp-10 Tablet, R-0           CONTINUE these medications which have NOT CHANGED    Details   omeprazole (PRILOSEC) 40 mg capsule Take 40 mg by mouth two (2) times a day., Historical Med      polyethylene glycol (Miralax) 17 gram/dose powder Take 17 g by mouth daily. , Historical Med      triamcinolone acetonide (KENALOG) 0.1 % topical cream APPLY CREAM EXTERNALLY TO AFFECTED AREA TWICE DAILY, Historical Med      diclofenac (VOLTAREN) 1 % gel Apply 4 g to affected area four (4) times daily for 25 days. , Normal, Disp-4 Each, R-0      escitalopram oxalate (LEXAPRO) 20 mg tablet Take 1 Tablet by mouth daily for 90 days. , Normal, Disp-30 Tablet, R-2      amLODIPine (NORVASC) 10 mg tablet Take 1 Tablet by mouth daily for 90 days. , Normal, Disp-90 Tablet, R-0      lisinopriL (PRINIVIL, ZESTRIL) 10 mg tablet Take 1 Tablet by mouth daily for 90 days. , Normal, Disp-30 Tablet, R-2               DISCONTINUED MEDICATIONS:  Discharge Medication List as of 3/2/2023  7:43 AM          I am the Primary Clinician of Record. Cristi Reed MD (electronically signed)    (Please note that parts of this dictation were completed with voice recognition software. Quite often unanticipated grammatical, syntax, homophones, and other interpretive errors are inadvertently transcribed by the computer software. Please disregards these errors.  Please excuse any errors that have escaped final proofreading.)

## 2023-03-09 ENCOUNTER — PATIENT MESSAGE (OUTPATIENT)
Dept: INTERNAL MEDICINE CLINIC | Age: 63
End: 2023-03-09

## 2023-03-09 DIAGNOSIS — F81.9 MENTAL DEVELOPMENTAL DELAY: Primary | ICD-10-CM

## 2023-04-03 RX ORDER — ESCITALOPRAM OXALATE 20 MG/1
20 TABLET ORAL DAILY
Qty: 90 TABLET | Refills: 1 | Status: SHIPPED | OUTPATIENT
Start: 2023-04-03 | End: 2023-07-02

## 2023-04-03 NOTE — TELEPHONE ENCOUNTER
Patient will be out of medication on the 11th, calling to get it done and sent over in time. Send to [facility] mail order. Thanks! Please get done in time. Requested Prescriptions     Pending Prescriptions Disp Refills    escitalopram oxalate (LEXAPRO) 20 mg tablet 30 Tablet 2     Sig: Take 1 Tablet by mouth daily for 90 days.

## 2023-04-24 ENCOUNTER — TELEPHONE (OUTPATIENT)
Dept: INTERNAL MEDICINE CLINIC | Age: 63
End: 2023-04-24

## 2023-04-25 NOTE — TELEPHONE ENCOUNTER
Patient is asking about a referral to physical therapy for his shoulder pain, however, when I read his notes his not to work for several weeks due to rib fractures - did you discuss physical therapy referral for him, and should he wait now that he has the rib fractures  Breezy Pires LPN  4/37/5066  73:27 AM

## 2023-04-25 NOTE — TELEPHONE ENCOUNTER
Returned call to patient - per George RAY he should wait on physical therapy for a few more weeks until his ribs heal from the small fractures , then can revisit the physical therapy discussion  Felicia Palmer LPN  8/78/0751  9:56 PM

## 2023-05-04 ENCOUNTER — TELEPHONE (OUTPATIENT)
Dept: INTERNAL MEDICINE CLINIC | Age: 63
End: 2023-05-04

## 2023-05-09 ENCOUNTER — OFFICE VISIT (OUTPATIENT)
Facility: CLINIC | Age: 63
End: 2023-05-09
Payer: COMMERCIAL

## 2023-05-09 VITALS
BODY MASS INDEX: 19.77 KG/M2 | RESPIRATION RATE: 20 BRPM | TEMPERATURE: 97.6 F | DIASTOLIC BLOOD PRESSURE: 88 MMHG | HEIGHT: 72 IN | HEART RATE: 62 BPM | SYSTOLIC BLOOD PRESSURE: 136 MMHG | WEIGHT: 146 LBS | OXYGEN SATURATION: 97 %

## 2023-05-09 DIAGNOSIS — M54.9 BACK PAIN, UNSPECIFIED BACK LOCATION, UNSPECIFIED BACK PAIN LATERALITY, UNSPECIFIED CHRONICITY: ICD-10-CM

## 2023-05-09 DIAGNOSIS — G89.29 CHRONIC LEFT SHOULDER PAIN: Primary | ICD-10-CM

## 2023-05-09 DIAGNOSIS — M25.512 CHRONIC LEFT SHOULDER PAIN: Primary | ICD-10-CM

## 2023-05-09 DIAGNOSIS — R07.81 RIB PAIN ON LEFT SIDE: ICD-10-CM

## 2023-05-09 PROCEDURE — 99214 OFFICE O/P EST MOD 30 MIN: CPT | Performed by: NURSE PRACTITIONER

## 2023-05-09 PROCEDURE — 3079F DIAST BP 80-89 MM HG: CPT | Performed by: NURSE PRACTITIONER

## 2023-05-09 PROCEDURE — 3075F SYST BP GE 130 - 139MM HG: CPT | Performed by: NURSE PRACTITIONER

## 2023-05-09 RX ORDER — ESCITALOPRAM OXALATE 20 MG/1
20 TABLET ORAL DAILY
Qty: 90 TABLET | Refills: 2 | Status: SHIPPED | OUTPATIENT
Start: 2023-05-09 | End: 2023-08-07

## 2023-05-09 RX ORDER — SUCRALFATE 1 G/1
TABLET ORAL
COMMUNITY
Start: 2023-05-08

## 2023-05-09 RX ORDER — TRAZODONE HYDROCHLORIDE 50 MG/1
50 TABLET ORAL NIGHTLY
Qty: 90 TABLET | Refills: 2 | Status: SHIPPED | OUTPATIENT
Start: 2023-05-09 | End: 2023-08-07

## 2023-05-09 RX ORDER — CETIRIZINE HYDROCHLORIDE 10 MG/1
10 TABLET ORAL DAILY
Qty: 30 TABLET | Refills: 0 | COMMUNITY
Start: 2023-04-13 | End: 2023-05-09 | Stop reason: SDUPTHER

## 2023-05-09 RX ORDER — TRAZODONE HYDROCHLORIDE 50 MG/1
1 TABLET ORAL NIGHTLY
Qty: 30 TABLET | Refills: 0 | COMMUNITY
Start: 2023-04-13 | End: 2023-05-09 | Stop reason: SDUPTHER

## 2023-05-09 RX ORDER — CETIRIZINE HYDROCHLORIDE 10 MG/1
10 TABLET ORAL DAILY
Qty: 90 TABLET | Refills: 2 | Status: SHIPPED | OUTPATIENT
Start: 2023-05-09 | End: 2023-08-07

## 2023-05-09 SDOH — ECONOMIC STABILITY: INCOME INSECURITY: HOW HARD IS IT FOR YOU TO PAY FOR THE VERY BASICS LIKE FOOD, HOUSING, MEDICAL CARE, AND HEATING?: SOMEWHAT HARD

## 2023-05-09 ASSESSMENT — PATIENT HEALTH QUESTIONNAIRE - PHQ9
SUM OF ALL RESPONSES TO PHQ QUESTIONS 1-9: 0
SUM OF ALL RESPONSES TO PHQ QUESTIONS 1-9: 2
SUM OF ALL RESPONSES TO PHQ QUESTIONS 1-9: 0
SUM OF ALL RESPONSES TO PHQ QUESTIONS 1-9: 0
1. LITTLE INTEREST OR PLEASURE IN DOING THINGS: 0
SUM OF ALL RESPONSES TO PHQ9 QUESTIONS 1 & 2: 2
SUM OF ALL RESPONSES TO PHQ QUESTIONS 1-9: 2
SUM OF ALL RESPONSES TO PHQ9 QUESTIONS 1 & 2: 0
2. FEELING DOWN, DEPRESSED OR HOPELESS: 1
SUM OF ALL RESPONSES TO PHQ QUESTIONS 1-9: 0
SUM OF ALL RESPONSES TO PHQ QUESTIONS 1-9: 2
1. LITTLE INTEREST OR PLEASURE IN DOING THINGS: 1
SUM OF ALL RESPONSES TO PHQ QUESTIONS 1-9: 2
2. FEELING DOWN, DEPRESSED OR HOPELESS: 0

## 2023-05-09 ASSESSMENT — ENCOUNTER SYMPTOMS
GASTROINTESTINAL NEGATIVE: 1
EYES NEGATIVE: 1
RESPIRATORY NEGATIVE: 1

## 2023-05-09 NOTE — PATIENT INSTRUCTIONS
Referral placed for Dr Camila Oneal at [facility] if needed. Lexapro refilled for 90 days with 2 refills to [facility].

## 2023-05-09 NOTE — PROGRESS NOTES
Chief Complaint   Patient presents with    Anxiety     Follow up - discuss medications and upcoming ortho appt

## 2023-05-09 NOTE — PROGRESS NOTES
Kerri Gillespie (: 1960) is a 63 y.o. male is here for evaluation of the following chief complaint(s): Anxiety (Follow up - discuss medications and upcoming ortho appt)        Subjective/Objective:   Virgil Lewis was seen today for Anxiety (Follow up - discuss medications and upcoming ortho appt)  Pt states doing well with his anxiety on the Lexapro and Trazodone. Pt would like 90 day supply to his mail order pharmacy. Pt also needs refill of zyrtec. Pt reports progressing with physical therapy to his left shoulder and has 1 more appt. Would like a referral to continue PT on his low back. Pt also getting a second opinion regarding surgery to left shoulder with Dr Latanya Cruz MD at [facility]. Referral placed if needed. Pt and sister deny any other complaints or concerns. Review of Systems   Constitutional: Negative. HENT: Negative. Eyes: Negative. Respiratory: Negative. Cardiovascular: Negative. Gastrointestinal: Negative. Genitourinary: Negative. Musculoskeletal:         Pain to left shoulder   Skin: Negative. Neurological: Negative. Hematological: Negative. Psychiatric/Behavioral:  Negative for agitation, behavioral problems, confusion, decreased concentration, dysphoric mood, hallucinations, self-injury, sleep disturbance and suicidal ideas. The patient is nervous/anxious. The patient is not hyperactive. Physical Exam  Vitals reviewed. Constitutional:       General: He is not in acute distress. Appearance: Normal appearance. He is not ill-appearing or toxic-appearing. HENT:      Head: Normocephalic and atraumatic. Right Ear: External ear normal.      Left Ear: External ear normal.      Nose: Nose normal.      Mouth/Throat:      Mouth: Mucous membranes are moist.   Eyes:      Conjunctiva/sclera: Conjunctivae normal.   Cardiovascular:      Rate and Rhythm: Normal rate and regular rhythm. Pulses: Normal pulses.       Heart sounds:

## 2023-05-17 ENCOUNTER — TELEPHONE (OUTPATIENT)
Facility: CLINIC | Age: 63
End: 2023-05-17

## 2023-05-23 NOTE — TELEPHONE ENCOUNTER
Patient calling and needs refills for 2 medications. Patient has about 12 left of each.     Amlodipine (norvasc) 10 MG tablet   Lisinopril (Prinivil, Zestril) 10 MG tablet    Send to;  [facility] [place] please

## 2023-05-24 RX ORDER — LISINOPRIL 10 MG/1
10 TABLET ORAL DAILY
Qty: 90 TABLET | Refills: 1 | Status: SHIPPED | OUTPATIENT
Start: 2023-05-24 | End: 2023-08-22

## 2023-05-24 RX ORDER — AMLODIPINE BESYLATE 10 MG/1
10 TABLET ORAL DAILY
Qty: 90 TABLET | Refills: 1 | Status: SHIPPED | OUTPATIENT
Start: 2023-05-24 | End: 2023-11-20

## 2023-06-07 ENCOUNTER — TELEPHONE (OUTPATIENT)
Facility: CLINIC | Age: 63
End: 2023-06-07

## 2023-06-07 NOTE — TELEPHONE ENCOUNTER
Spoke with patient. Pt was never on cyclobenzaprine per our records. Spoke with sister as well. No need for refill.

## 2023-06-19 ENCOUNTER — TELEPHONE (OUTPATIENT)
Facility: CLINIC | Age: 63
End: 2023-06-19

## 2023-06-20 ENCOUNTER — PATIENT MESSAGE (OUTPATIENT)
Facility: CLINIC | Age: 63
End: 2023-06-20

## 2023-06-20 RX ORDER — LISINOPRIL 20 MG/1
20 TABLET ORAL DAILY
Qty: 90 TABLET | Refills: 1 | Status: SHIPPED | OUTPATIENT
Start: 2023-06-20

## 2023-07-07 ENCOUNTER — TELEPHONE (OUTPATIENT)
Facility: CLINIC | Age: 63
End: 2023-07-07

## 2023-07-07 NOTE — TELEPHONE ENCOUNTER
Pt calling to speak to Drewdayanna Finn. I made pt aware that she would not be here until Tuesday, pt said that was fine.      Best phone #[phone]

## 2023-07-11 ENCOUNTER — TELEPHONE (OUTPATIENT)
Facility: CLINIC | Age: 63
End: 2023-07-11

## 2023-07-11 NOTE — TELEPHONE ENCOUNTER
Faxed order for physical therapy to [facility] at [phone] per request of patient - confirmation of receipt received by fax  Marito Lopes LPN 3/55/0509 55:71 AM

## 2023-07-11 NOTE — TELEPHONE ENCOUNTER
----- Message from  Cristina sent at 7/3/2023  1:08 PM EDT -----  Subject: Message to Provider    QUESTIONS  Information for Provider? patient needs referral fax over to Anjelica Bhatia  the referral was missed placed   and he has a appointment 7/11/23 fax number [phone]  ---------------------------------------------------------------------------  --------------  [address]  [phone]; OK to leave message on voicemail  ---------------------------------------------------------------------------  --------------  SCRIPT ANSWERS  Relationship to Patient?  Self

## 2023-07-11 NOTE — PROGRESS NOTES
Intake Note      Patient Name: Juarez Blake  YOB: 1960    Age: 63 y.o. Date of Intake: 7/12/2023   Education: 12 Ethnicity White   Gender: Male Referring Provider: KAITLIN Thurston     REASON FOR REFERRAL AND EVALUATION PROCEDURES:  Juarez Blake  was referred for evaluation by his Primary Care Provider to assist in differential diagnosis and individualized treatment planning. he understood the rationale and procedures for evaluation, as well as the limits to confidentiality, and agreed to participate. he consented to have this report made available to his  treating providers through his  electronic medical records. History Sources: Patient, Relative (sister), and Medical Record    HISTORY OF PRESENT ILLNESS:  The patient is a 19-year-old male with pertinent medical history noted for hypertension; additional review of medical records also indicates the presence of anxiety. He presented for clinical interview accompanied by his sister, though he appeared capable of providing adequate history. According to the patient and his sister, the patient had a lifelong history of academic difficulties. While he graduated high school, they stated school was always challenging for him, he repeated the first grade, and he particularly struggled in math (including basic mathematics). The patient's sister reported the patient has always had difficulty \"processing\" information. For example, his sister reported that when he is provided with details related to his health care, he appears to become overwhelmed by them and he has difficulty organizing and scheduling appointments. This results in the patient typically deferring the management of his health care to his sister. The patient and his sister also reported he has always had difficulty with emotional regulation.   The patient reported he has always had a short temper and he has experienced symptoms of depression and anxiety throughout

## 2023-07-12 ENCOUNTER — OFFICE VISIT (OUTPATIENT)
Age: 63
End: 2023-07-12
Payer: COMMERCIAL

## 2023-07-12 DIAGNOSIS — F33.1 MAJOR DEPRESSIVE DISORDER, RECURRENT EPISODE, MODERATE WITH ANXIOUS DISTRESS (HCC): ICD-10-CM

## 2023-07-12 DIAGNOSIS — R45.4 IRRITABILITY AND ANGER: Primary | ICD-10-CM

## 2023-07-12 DIAGNOSIS — R41.844 IMPAIRED EXECUTIVE FUNCTIONING: ICD-10-CM

## 2023-07-12 PROCEDURE — 90791 PSYCH DIAGNOSTIC EVALUATION: CPT | Performed by: CLINICAL NEUROPSYCHOLOGIST

## 2023-07-12 PROCEDURE — 90785 PSYTX COMPLEX INTERACTIVE: CPT | Performed by: CLINICAL NEUROPSYCHOLOGIST

## 2023-07-13 ENCOUNTER — OFFICE VISIT (OUTPATIENT)
Facility: CLINIC | Age: 63
End: 2023-07-13
Payer: COMMERCIAL

## 2023-07-13 VITALS
TEMPERATURE: 97.8 F | DIASTOLIC BLOOD PRESSURE: 80 MMHG | OXYGEN SATURATION: 97 % | WEIGHT: 151 LBS | RESPIRATION RATE: 20 BRPM | HEIGHT: 72 IN | HEART RATE: 56 BPM | BODY MASS INDEX: 20.45 KG/M2 | SYSTOLIC BLOOD PRESSURE: 147 MMHG

## 2023-07-13 DIAGNOSIS — M79.18 LEFT BUTTOCK PAIN: ICD-10-CM

## 2023-07-13 DIAGNOSIS — G47.00 INSOMNIA, UNSPECIFIED TYPE: ICD-10-CM

## 2023-07-13 DIAGNOSIS — I10 PRIMARY HYPERTENSION: ICD-10-CM

## 2023-07-13 DIAGNOSIS — R45.4 IRRITABILITY AND ANGER: Primary | ICD-10-CM

## 2023-07-13 PROCEDURE — 99214 OFFICE O/P EST MOD 30 MIN: CPT | Performed by: NURSE PRACTITIONER

## 2023-07-13 PROCEDURE — 3077F SYST BP >= 140 MM HG: CPT | Performed by: NURSE PRACTITIONER

## 2023-07-13 PROCEDURE — 3079F DIAST BP 80-89 MM HG: CPT | Performed by: NURSE PRACTITIONER

## 2023-07-13 RX ORDER — CETIRIZINE HYDROCHLORIDE 10 MG/1
10 TABLET ORAL DAILY
Qty: 90 TABLET | Refills: 1 | Status: CANCELLED | OUTPATIENT
Start: 2023-07-13 | End: 2023-10-11

## 2023-07-13 RX ORDER — ESCITALOPRAM OXALATE 10 MG/1
10 TABLET ORAL DAILY
Qty: 30 TABLET | Refills: 3 | Status: SHIPPED | OUTPATIENT
Start: 2023-07-13 | End: 2023-07-13 | Stop reason: DRUGHIGH

## 2023-07-13 RX ORDER — ESCITALOPRAM OXALATE 10 MG/1
10 TABLET ORAL DAILY
Qty: 30 TABLET | Refills: 3 | Status: SHIPPED | OUTPATIENT
Start: 2023-07-13

## 2023-07-13 RX ORDER — DIVALPROEX SODIUM 125 MG/1
125 TABLET, DELAYED RELEASE ORAL 3 TIMES DAILY
Qty: 90 TABLET | Refills: 3 | Status: SHIPPED | OUTPATIENT
Start: 2023-07-13

## 2023-07-13 RX ORDER — DIVALPROEX SODIUM 125 MG/1
125 TABLET, DELAYED RELEASE ORAL 3 TIMES DAILY
Qty: 90 TABLET | Refills: 3 | Status: SHIPPED | OUTPATIENT
Start: 2023-07-13 | End: 2023-07-13 | Stop reason: SDUPTHER

## 2023-07-13 ASSESSMENT — PATIENT HEALTH QUESTIONNAIRE - PHQ9
SUM OF ALL RESPONSES TO PHQ QUESTIONS 1-9: 5
7. TROUBLE CONCENTRATING ON THINGS, SUCH AS READING THE NEWSPAPER OR WATCHING TELEVISION: 1
5. POOR APPETITE OR OVEREATING: 0
1. LITTLE INTEREST OR PLEASURE IN DOING THINGS: 0
SUM OF ALL RESPONSES TO PHQ QUESTIONS 1-9: 5
4. FEELING TIRED OR HAVING LITTLE ENERGY: 0
SUM OF ALL RESPONSES TO PHQ9 QUESTIONS 1 & 2: 1
9. THOUGHTS THAT YOU WOULD BE BETTER OFF DEAD, OR OF HURTING YOURSELF: 1
2. FEELING DOWN, DEPRESSED OR HOPELESS: 1
SUM OF ALL RESPONSES TO PHQ QUESTIONS 1-9: 5
6. FEELING BAD ABOUT YOURSELF - OR THAT YOU ARE A FAILURE OR HAVE LET YOURSELF OR YOUR FAMILY DOWN: 0
3. TROUBLE FALLING OR STAYING ASLEEP: 1
8. MOVING OR SPEAKING SO SLOWLY THAT OTHER PEOPLE COULD HAVE NOTICED. OR THE OPPOSITE, BEING SO FIGETY OR RESTLESS THAT YOU HAVE BEEN MOVING AROUND A LOT MORE THAN USUAL: 1
SUM OF ALL RESPONSES TO PHQ QUESTIONS 1-9: 4

## 2023-07-13 ASSESSMENT — ENCOUNTER SYMPTOMS
EYES NEGATIVE: 1
GASTROINTESTINAL NEGATIVE: 1
ALLERGIC/IMMUNOLOGIC NEGATIVE: 1
RESPIRATORY NEGATIVE: 1

## 2023-07-13 NOTE — PATIENT INSTRUCTIONS
Please decrease Lexapro to 10 mg and Start Depakote as discussed. Return in 6 weeks sooner for any new or worsening symptoms.     VICENTE Young NP

## 2023-07-13 NOTE — PROGRESS NOTES
Chief Complaint   Patient presents with    Mood Swings     Patients sister states that the patient has been very angry, mood shifts - does not think the Lexapro is holding his anxiety very well, may need a mood stabilizer - patient got upset at Physical Therapy and stated he would bust someone in the face .  . .      Blood Pressure Check     Discuss blood pressure readings

## 2023-07-13 NOTE — PROGRESS NOTES
Vito Hannah (: 1960) is a 63 y.o. male is here for evaluation of the following chief complaint(s): Mood Swings (Patients sister states that the patient has been very angry, mood shifts - does not think the Lexapro is holding his anxiety very well, may need a mood stabilizer - patient got upset at Physical Therapy and stated he would bust someone in the face . . ./) and Blood Pressure Check (Discuss blood pressure readings)        Subjective/Objective:   Santosh Tsai was seen today for Mood Swings (Patients sister states that the patient has been very angry, mood shifts - does not think the Lexapro is holding his anxiety very well, may need a mood stabilizer - patient got upset at Physical Therapy and stated he would bust someone in the face . . ./) and Blood Pressure Check (Discuss blood pressure readings)  This has been occurring for a couple of weeks. Pt saw neuro/psych yesterday for first time screening visit. Pt getting physical therapy and apparently threatened the PT provider. Pt verbalizing that he knows that he should not act this way. Sister with patient as well. Sister tearful. She states she is stressed trying to care for her brother especially when he acts out as he has been recently. Sister states that he has not been following the directions for his shoulder post op given by the surgeon and PT. Blood pressure mildly elevated but pt is agitated right now. Pt denies taking drugs or ETOH    Review of Systems   Constitutional: Negative. HENT: Negative. Eyes: Negative. Respiratory: Negative. Cardiovascular: Negative. Gastrointestinal: Negative. Endocrine: Negative. Genitourinary: Negative. Musculoskeletal: Negative. Skin: Negative. Allergic/Immunologic: Negative. Neurological: Negative. Hematological: Negative. Psychiatric/Behavioral:  Positive for agitation, behavioral problems and sleep disturbance.  Negative for confusion, decreased concentration,

## 2023-07-24 RX ORDER — DIVALPROEX SODIUM 125 MG/1
125 TABLET, DELAYED RELEASE ORAL 3 TIMES DAILY
Qty: 90 TABLET | Refills: 3 | OUTPATIENT
Start: 2023-07-24

## 2023-07-24 NOTE — TELEPHONE ENCOUNTER
Pt calling in for refill of depakote 125mg DR tab t [facility].  He says this is a new med and this is making him feel a lot better

## 2023-08-02 ENCOUNTER — TELEPHONE (OUTPATIENT)
Age: 63
End: 2023-08-02

## 2023-08-02 NOTE — TELEPHONE ENCOUNTER
Sister calling to speak with [facility] regarding insurance issue and appointments. Please call.

## 2023-08-03 NOTE — TELEPHONE ENCOUNTER
Dr Eboni Conley contacted the insurance and did a P2P. PA has been approved. Called pt's sister, Gene Marquez and informed her of the same. They will be here tomorrow.

## 2023-08-04 ENCOUNTER — PROCEDURE VISIT (OUTPATIENT)
Age: 63
End: 2023-08-04

## 2023-08-04 ENCOUNTER — PROCEDURE VISIT (OUTPATIENT)
Age: 63
End: 2023-08-04
Payer: COMMERCIAL

## 2023-08-04 DIAGNOSIS — R45.4 IRRITABILITY AND ANGER: Primary | ICD-10-CM

## 2023-08-04 DIAGNOSIS — F02.818: ICD-10-CM

## 2023-08-04 DIAGNOSIS — F10.21 ALCOHOL USE DISORDER, MODERATE, IN EARLY REMISSION (HCC): Primary | ICD-10-CM

## 2023-08-04 DIAGNOSIS — F15.20 MODERATE STIMULANT USE DISORDER (HCC): ICD-10-CM

## 2023-08-04 PROCEDURE — 96139 PSYCL/NRPSYC TST TECH EA: CPT | Performed by: CLINICAL NEUROPSYCHOLOGIST

## 2023-08-04 PROCEDURE — 96138 PSYCL/NRPSYC TECH 1ST: CPT | Performed by: CLINICAL NEUROPSYCHOLOGIST

## 2023-08-04 PROCEDURE — 96136 PSYCL/NRPSYC TST PHY/QHP 1ST: CPT | Performed by: CLINICAL NEUROPSYCHOLOGIST

## 2023-08-09 RX ORDER — DIVALPROEX SODIUM 125 MG/1
125 TABLET, DELAYED RELEASE ORAL 3 TIMES DAILY
Qty: 270 TABLET | Refills: 1 | Status: SHIPPED | OUTPATIENT
Start: 2023-08-09

## 2023-08-09 RX ORDER — LISINOPRIL 20 MG/1
20 TABLET ORAL DAILY
Qty: 90 TABLET | Refills: 1 | Status: SHIPPED | OUTPATIENT
Start: 2023-08-09

## 2023-08-09 NOTE — TELEPHONE ENCOUNTER
----- Message from Marquita Montes MD sent at 7/7/2019  3:15 PM CDT -----  Multi-nodule goiter.  Couple nodules slightly increased in size.  Follow-up with endocrinology.   Med refills sent to [facility] for approval  Reggie Lopez LPN 2/0/8221 83:98 PM

## 2023-08-09 NOTE — TELEPHONE ENCOUNTER
Pt calling in for meds to [facility]. Lisinopril 10mg  Depakote 125mg  Hydro?  10mg

## 2023-08-22 RX ORDER — ACETAMINOPHEN 325 MG/1
650 TABLET ORAL EVERY 6 HOURS PRN
COMMUNITY

## 2023-08-23 ENCOUNTER — ANESTHESIA (OUTPATIENT)
Facility: HOSPITAL | Age: 63
End: 2023-08-23
Payer: COMMERCIAL

## 2023-08-23 ENCOUNTER — ANESTHESIA EVENT (OUTPATIENT)
Facility: HOSPITAL | Age: 63
End: 2023-08-23
Payer: COMMERCIAL

## 2023-08-23 ENCOUNTER — HOSPITAL ENCOUNTER (OUTPATIENT)
Facility: HOSPITAL | Age: 63
Setting detail: OUTPATIENT SURGERY
Discharge: HOME | End: 2023-08-23
Attending: INTERNAL MEDICINE | Admitting: INTERNAL MEDICINE
Payer: COMMERCIAL

## 2023-08-23 VITALS
DIASTOLIC BLOOD PRESSURE: 87 MMHG | OXYGEN SATURATION: 98 % | WEIGHT: 142.6 LBS | SYSTOLIC BLOOD PRESSURE: 173 MMHG | HEART RATE: 49 BPM | BODY MASS INDEX: 19.34 KG/M2 | TEMPERATURE: 97.6 F | RESPIRATION RATE: 18 BRPM

## 2023-08-23 PROCEDURE — 3600007502: Performed by: INTERNAL MEDICINE

## 2023-08-23 PROCEDURE — 3700000001 HC ADD 15 MINUTES (ANESTHESIA): Performed by: INTERNAL MEDICINE

## 2023-08-23 PROCEDURE — 3600007512: Performed by: INTERNAL MEDICINE

## 2023-08-23 PROCEDURE — 3700000000 HC ANESTHESIA ATTENDED CARE: Performed by: INTERNAL MEDICINE

## 2023-08-23 PROCEDURE — 6360000002 HC RX W HCPCS: Performed by: ANESTHESIOLOGY

## 2023-08-23 PROCEDURE — 2709999900 HC NON-CHARGEABLE SUPPLY: Performed by: INTERNAL MEDICINE

## 2023-08-23 PROCEDURE — 7100000011 HC PHASE II RECOVERY - ADDTL 15 MIN: Performed by: INTERNAL MEDICINE

## 2023-08-23 PROCEDURE — 2580000003 HC RX 258: Performed by: INTERNAL MEDICINE

## 2023-08-23 PROCEDURE — 2500000003 HC RX 250 WO HCPCS: Performed by: ANESTHESIOLOGY

## 2023-08-23 PROCEDURE — 88305 TISSUE EXAM BY PATHOLOGIST: CPT

## 2023-08-23 PROCEDURE — 2580000003 HC RX 258: Performed by: REGISTERED NURSE

## 2023-08-23 PROCEDURE — 7100000010 HC PHASE II RECOVERY - FIRST 15 MIN: Performed by: INTERNAL MEDICINE

## 2023-08-23 RX ORDER — LIDOCAINE HYDROCHLORIDE 20 MG/ML
INJECTION, SOLUTION EPIDURAL; INFILTRATION; INTRACAUDAL; PERINEURAL PRN
Status: DISCONTINUED | OUTPATIENT
Start: 2023-08-23 | End: 2023-08-23

## 2023-08-23 RX ORDER — LIDOCAINE HYDROCHLORIDE 20 MG/ML
INJECTION, SOLUTION EPIDURAL; INFILTRATION; INTRACAUDAL; PERINEURAL PRN
Status: DISCONTINUED | OUTPATIENT
Start: 2023-08-23 | End: 2023-08-23 | Stop reason: SDUPTHER

## 2023-08-23 RX ORDER — SODIUM CHLORIDE 9 MG/ML
25 INJECTION, SOLUTION INTRAVENOUS PRN
Status: CANCELLED | OUTPATIENT
Start: 2023-08-23

## 2023-08-23 RX ORDER — SODIUM CHLORIDE 9 MG/ML
INJECTION, SOLUTION INTRAVENOUS CONTINUOUS PRN
Status: COMPLETED | OUTPATIENT
Start: 2023-08-23 | End: 2023-08-23

## 2023-08-23 RX ORDER — SODIUM CHLORIDE 0.9 % (FLUSH) 0.9 %
5-40 SYRINGE (ML) INJECTION EVERY 12 HOURS SCHEDULED
Status: CANCELLED | OUTPATIENT
Start: 2023-08-23

## 2023-08-23 RX ORDER — SODIUM CHLORIDE 0.9 % (FLUSH) 0.9 %
5-40 SYRINGE (ML) INJECTION PRN
Status: CANCELLED | OUTPATIENT
Start: 2023-08-23

## 2023-08-23 RX ORDER — SODIUM CHLORIDE 9 MG/ML
INJECTION, SOLUTION INTRAVENOUS CONTINUOUS PRN
Status: DISCONTINUED | OUTPATIENT
Start: 2023-08-23 | End: 2023-08-23 | Stop reason: SDUPTHER

## 2023-08-23 RX ADMIN — LIDOCAINE HYDROCHLORIDE 80 MG: 20 INJECTION, SOLUTION EPIDURAL; INFILTRATION; INTRACAUDAL; PERINEURAL at 14:50

## 2023-08-23 RX ADMIN — PROPOFOL 550 MG: 10 INJECTION, EMULSION INTRAVENOUS at 15:26

## 2023-08-23 RX ADMIN — SODIUM CHLORIDE: 9 INJECTION, SOLUTION INTRAVENOUS at 13:15

## 2023-08-23 NOTE — PROGRESS NOTES
Diamond Hamman ARRIVAL INFORMATION:  Verified patient name and date of birth, scheduled procedure, and informed consent. : Aayush Arroyo (sister) contact number: [phone]  Physician and staff can share information with the . Belongings with patient include:  Clothing, wallet and phone left with sister        GI FOCUSED ASSESSMENT:  Neuro: Awake, alert, oriented x4  Respiratory: even and unlabored   GI: soft and non-distended  EKG Rhythm: normal sinus rhythm    Education:Reviewed general discharge instructions and  information. The risks and benefits of the bite block have been explained to patient. Patient verbalizes understanding.

## 2023-08-23 NOTE — ANESTHESIA PRE PROCEDURE
Airway: Mallampati: II  TM distance: >3 FB   Neck ROM: full  Mouth opening: > = 3 FB   Dental:    (+) poor dentition  Comment: Missing several teeth, denies any loose. Pulmonary:Negative Pulmonary ROS and normal exam  breath sounds clear to auscultation                             Cardiovascular:    (+) hypertension:,         Rhythm: regular  Rate: normal                    Neuro/Psych:   (+) neuromuscular disease:, depression/anxiety             GI/Hepatic/Renal:   (+) GERD:, PUD,           Endo/Other: Negative Endo/Other ROS                    Abdominal: normal exam            Vascular: negative vascular ROS. Other Findings:           Anesthesia Plan      MAC     ASA 2       Induction: intravenous. Anesthetic plan and risks discussed with patient. Plan discussed with CRNA.                     Priscila Hewitt MD   8/23/2023

## 2023-08-23 NOTE — PROGRESS NOTES
Emy Marc  1960  [id number]    Situation:  Verbal report received from: KRZYSZTOF Mejia  Procedure: Procedure(s):  EGD BIOPSY  COLONOSCOPY POLYPECTOMY SNARE/COLD BIOPSY    Background:    Preoperative diagnosis: Hiatal hernia [K44.9]  Ulcer of esophagus without bleeding [K22.10]  Postoperative diagnosis: * No post-op diagnosis entered *    :  Dr. Jensen Garcia  Assistant(s): Circulator: Brian Rodriguez RN; Annia Hewitt RN; Clem Prader, RN  Endoscopy Technician: Abundio Schmidt    Vital signs stable   Abdominal assessment: round and soft       Patient returned to baseline, vital signs stable (see vital sign flowsheet). Patient offered liquids and tolerated well. Respiratory status within defined limits. Abdomen soft not tender. Skin with in defined limits. Responsible party driving patient home was given the opportunity to ask questions. Patient discharged with documented belongings.

## 2023-08-23 NOTE — OP NOTE
Colonoscopy and EGD Procedure Note      Indications:   erosive esophagitis, gastritis; screening      : Stanford Jim MD    Staff: Circulator: Dinora Vazquez RN; Carlos Lockett RN; Bishnu Ureña RN  Endoscopy Technician: Nicola Oneill    Referring Provider: VICENTE Kinsey NP    Sedation:  MAC anesthesia Propofol    Procedure Details:  After informed consent was obtained with all risks and benefits of procedure explained and pre-operative exam completed, pt was placed in the left lateral decubitus position. Following sequential administration of sedation as per above, the gastroscope was inserted into the mouth and advanced under direct vision to second portion of the duodenum. A careful inspection was made as the gastroscope was withdrawn, including a retroflexed view of the proximal stomach; findings and interventions are described below. EGD Findings:   Esophagus:Normal proximal and mid esophagus, distal esophagus. EGJ at 38cm and irregular with C0M2 possible Basilio's, Lockhart protocol four quadrants cold forceps biopsies with minimal bleeding. Medium 3cm hiatal hernia. Stomach: moderate diffuse erythema and congestion in antrum/incisura, body healed, biopsied cold forceps with minimal bleeding antrum, incisura, body, cardia, to r/o H pylori. Duodenum/jejunum: normal mucosa    The bed was then turned and upon sequential sedation as per above, a digital rectal exam was performed per below. The Olympus videocolonoscope was inserted in the rectum and carefully advanced to the cecum, which was identified by the ileocecal valve and appendiceal orifice. The quality of preparation was  excellent BPS 9 after extensive lavage . The colonoscope was slowly withdrawn with careful evaluation between folds. Retroflexion in the rectum was performed.      Colon Findings:   AYDEN: unremarkable  Rectum: one 10mm sessile polyp removed with cold snare polypectomy,

## 2023-08-23 NOTE — DISCHARGE INSTRUCTIONS
Olivia Stage  [id number]  1960    It was my pleasure seeing you for your procedure. You will also receive a summary report with the findings from this procedure and any further recommendations. If you had polyps removed or biopsies taken during your procedure, you will receive a separate letter from me within the next 2 weeks. If you don't receive this letter or if you have any questions, please call my office [phone]. Please take note of the post procedure instructions listed below. Best Wishes,    Dr. Anthony Campa    These instructions give you information on caring for yourself after your procedure. Call your doctor if you have any problems or questions after your procedure. HOME CARE  Walk if you have belly cramping or gas. Walking will help get rid of the air and reduce the bloated feeling in your belly (abdomen). Your IV site (where you received drugs) may be tender to touch. Place warm towels on the site; keep your arm up on two pillows if you have any swelling or soreness in the area. You may shower. ACTIVITY:  Take frequent rest periods and move at a slower pace for the next 24 hours. .  You may resume your regular activity tomorrow if you are feeling back to normal.  Do not drive or ride a bicycle for at least 24 hours (because of the medicine (anesthesia) used during the test). Do not sign any important legal documents or use or operate any machinery for 24 hours  Do not take sleeping medicines/nerve drugs for 24 hours unless the doctor tells you. You can return to work/school tomorrow unless otherwise instructed. NUTRITION:  Drink plenty of fluids to keep your pee (urine) clear or pale yellow  Begin with a light meal and progress to your normal diet. Heavy or fried foods are harder to digest and may make you feel sick to your stomach (nauseated).   Once you are feeling back to normal, you may resume your normal

## 2023-08-23 NOTE — PERIOP NOTE
See anesthesia note and MAR for medications given during procedure. Received report from anesthesia staff on vital signs and status of patient.      Endoscope was pre-cleaned at the bedside immediately following procedure by BRIANNE Cantu

## 2023-08-23 NOTE — ANESTHESIA POSTPROCEDURE EVALUATION
Department of Anesthesiology  Postprocedure Note    Patient: Mariela Angel  MRN: [id number]  YOB: 1960  Date of evaluation: 8/23/2023      Procedure Summary     Date: 08/23/23 Room / Location: [facility] ENDO 01 / [facility] ENDOSCOPY    Anesthesia Start: 1127 Anesthesia Stop: 1704    Procedures:       EGD BIOPSY      COLONOSCOPY POLYPECTOMY SNARE/COLD BIOPSY Diagnosis:       Hiatal hernia      Esophagitis, unspecified without bleeding      Gastritis without bleeding      Colon polyps      (Hiatal hernia [K44.9])      (Ulcer of esophagus without bleeding [K22.10])    Surgeons:  Carla Westfall MD Responsible Provider: Lemuel Singer DO    Anesthesia Type: TIVA ASA Status: 2          Anesthesia Type: TIVA    Ty Phase I: Ty Score: 10    Ty Phase II:        Anesthesia Post Evaluation    Patient location during evaluation: PACU  Patient participation: complete - patient participated  Level of consciousness: awake  Airway patency: patent  Nausea & Vomiting: no vomiting and no nausea  Complications: no  Cardiovascular status: hemodynamically stable  Respiratory status: acceptable  Hydration status: euvolemic

## 2023-08-24 ENCOUNTER — OFFICE VISIT (OUTPATIENT)
Facility: CLINIC | Age: 63
End: 2023-08-24

## 2023-08-24 ENCOUNTER — OFFICE VISIT (OUTPATIENT)
Age: 63
End: 2023-08-24
Payer: COMMERCIAL

## 2023-08-24 VITALS
DIASTOLIC BLOOD PRESSURE: 77 MMHG | OXYGEN SATURATION: 98 % | HEIGHT: 72 IN | TEMPERATURE: 98.7 F | WEIGHT: 147 LBS | RESPIRATION RATE: 20 BRPM | SYSTOLIC BLOOD PRESSURE: 117 MMHG | HEART RATE: 59 BPM | BODY MASS INDEX: 19.91 KG/M2

## 2023-08-24 DIAGNOSIS — F02.818: ICD-10-CM

## 2023-08-24 DIAGNOSIS — M79.18 LEFT BUTTOCK PAIN: ICD-10-CM

## 2023-08-24 DIAGNOSIS — F15.20 MODERATE STIMULANT USE DISORDER (HCC): ICD-10-CM

## 2023-08-24 DIAGNOSIS — F10.21 ALCOHOL USE DISORDER, MODERATE, IN EARLY REMISSION (HCC): Primary | ICD-10-CM

## 2023-08-24 DIAGNOSIS — I10 PRIMARY HYPERTENSION: Primary | ICD-10-CM

## 2023-08-24 DIAGNOSIS — F41.9 ANXIETY: ICD-10-CM

## 2023-08-24 PROCEDURE — 96132 NRPSYC TST EVAL PHYS/QHP 1ST: CPT | Performed by: CLINICAL NEUROPSYCHOLOGIST

## 2023-08-24 PROCEDURE — 96133 NRPSYC TST EVAL PHYS/QHP EA: CPT | Performed by: CLINICAL NEUROPSYCHOLOGIST

## 2023-08-24 RX ORDER — DIVALPROEX SODIUM 250 MG/1
250 TABLET, DELAYED RELEASE ORAL 2 TIMES DAILY
Qty: 90 TABLET | Refills: 3 | Status: SHIPPED | OUTPATIENT
Start: 2023-08-24

## 2023-08-24 NOTE — PROGRESS NOTES
Chief Complaint   Patient presents with    Anxiety     Follow up - discuss medication change - Depakote     Back Pain     Would like an order to continue PT at [facility] in [facility] for sciatic pain

## 2023-08-24 NOTE — PROGRESS NOTES
Testing session 1/2. Second testing session scheduled for this afternoon. Report will be attached to that encounter.

## 2023-08-24 NOTE — PATIENT INSTRUCTIONS
It was a pleasure to see you today. Depakote dose changed. Continue with physical therapy. Return in 2 months for yearly labs and reassessment.      VICENTE Holcomb NP

## 2023-08-24 NOTE — PROGRESS NOTES
Prior to seeing the patient I reviewed pertinent records, including the previously completed report, the records in [address] [place] [address], and any updated visits from other providers since the patient's last visit. Today, I engaged in a psychoeducational and supportive feedback session with the patient. I provided psychotherapy in the form of psychoeducation and support with respect to the results of the recent Neuropsychological Evaluation, including discussing individual tests as well as patient's areas of neurocognitive strength versus weakness. We discussed, in detail, the following:  Cognitive testing revealed a decline in intellectual and executive functioning  Cognitive dysfunction is believed to be related to the patient's history of polysubstance abuse  Reviewed recommendations outlined in report  Answered questions to the best of my ability    Education was provided regarding my diagnostic impressions, and we discussed treatment plan/options. I also answered numerous questions related to the clinical findings, including discussing various methods to improve cognition and mood. Supportive/Cognitive Behavioral/Solution Focused psychotherapy provided. The patient needs to:    Follow-up with referring provider for ongoing management  Seek additional behavioral health services  Utilize behavioral interventions that improve mood and reduce risk for cognitive dysfunction (e.g., exercise, diet, and cognitive stimulation)    TIME:  Clinical Interview: 1150 - 1125 = 35 minutes  Testing and scoring by provider: 16 minutes  Testing by technician: Maribeth Real7 & 4789 - 2731 = 287minutes  Scoring by technician: 57 minutes  Neuropsychological testing evaluation services*: 174 minutes + 20 minutes feedback = 194 minutes total    BILLING:  18530 x 1 Unit  96136 x 1 Unit  96138 x 1 Unit  96139 x 10 Units  96132 x 1 Unit  96133 x 2 Units    *Neuropsychological testing evaluation services include: Integration of patient data,

## 2023-08-24 NOTE — PROGRESS NOTES
daily, Disp: , Rfl:     triamcinolone (KENALOG) 0.1 % cream, APPLY CREAM EXTERNALLY TO AFFECTED AREA TWICE DAILY, Disp: , Rfl:         Pertinent Neurological History:  Seizure: Never  Stroke: Never  TBI: Never     Pertinent Labs:  Lab Date Result   TSH 10/21/2022 Within reference range      PSYCHOSOCIAL HISTORY:  Birth/Development: Born in [place] but primarily grew up in [place]. 1 of 7 children. Language: English  Occupation: History of employment and furniture delivery, grocery store work, and HVAC/electrical work (untrained/unlicensed). The patient reported he has not worked since around 2005.  Service: None  Relationship Status: Single  Housing: The patient reportedly lives alone in the home his parents left him after they passed away. His sister stated his parents were always concerned about him. While the house was not paid off when they passed away, the patient's sister reported she had another one of the sisters withdrew money from the snf accounts to pay off the mortgage so the patient would have a place to live. Legal: No current civil/criminal legal.  Patient applied for Social Security disability but has not yet received a determination. No POA. Patient stated he had a history of charges related to DUI and substance use. Substance Use:  Alcohol: The patient stopped drinking in October 2022. Before he stopped drinking, he had been drinking approximately 3-4 40 ounce beers per day (approximate equivalent of 7.5-10 drinks per day). The patient reported he consumed alcohol at this level for \"a long long time. \"  Recreational/Illicit Substances: Patient reported history of polysubstance abuse, including the use of cocaine, crack, and marijuana. He reported his use of cocaine and crack occurred for many years. He reported he last used these substances over 10 years ago.   Treatment: Denied     BEHAVIORAL OBSERVATIONS:  Appearance: Casually dressed, Well-groomed, and Appeared

## 2023-08-25 ENCOUNTER — TELEPHONE (OUTPATIENT)
Age: 63
End: 2023-08-25

## 2023-08-27 ASSESSMENT — ENCOUNTER SYMPTOMS
BACK PAIN: 1
ALLERGIC/IMMUNOLOGIC NEGATIVE: 1
RESPIRATORY NEGATIVE: 1
EYES NEGATIVE: 1
GASTROINTESTINAL NEGATIVE: 1

## 2023-08-30 ENCOUNTER — TELEPHONE (OUTPATIENT)
Facility: CLINIC | Age: 63
End: 2023-08-30

## 2023-08-30 NOTE — TELEPHONE ENCOUNTER
----- Message from Farheen Rodriguez sent at 8/30/2023  3:51 PM EDT -----  Subject: Refill Request    QUESTIONS  Name of Medication? cetirizine (ZYRTEC) 10 MG tablet  Patient-reported dosage and instructions? 10 mg once a day   How many days do you have left? 0  Preferred Pharmacy? [facility] phone number (if available)? [phone]  ---------------------------------------------------------------------------  --------------  Ninfa WOMACK  What is the best way for the office to contact you? OK to leave message on   voicemail  Preferred Call Back Phone Number? [phone]  ---------------------------------------------------------------------------  --------------  SCRIPT ANSWERS  Relationship to Patient?  Self

## 2023-08-31 ENCOUNTER — TELEPHONE (OUTPATIENT)
Facility: CLINIC | Age: 63
End: 2023-08-31

## 2023-08-31 NOTE — TELEPHONE ENCOUNTER
Spoke with patients sister. Would like form for disability scanned to chart so it can be printed at home.

## 2023-09-01 RX ORDER — CETIRIZINE HYDROCHLORIDE 10 MG/1
10 TABLET ORAL DAILY
Qty: 90 TABLET | Refills: 1 | Status: SHIPPED | OUTPATIENT
Start: 2023-09-01

## 2023-09-05 NOTE — TELEPHONE ENCOUNTER
Pt calling in for lexapro 10mg to go to [facility], he is out of med. I asked pt if he wanted to have this called in locally since he was out of med, he said he prefers to use mail order.

## 2023-09-07 RX ORDER — ESCITALOPRAM OXALATE 10 MG/1
10 TABLET ORAL DAILY
Qty: 90 TABLET | Refills: 1 | Status: SHIPPED | OUTPATIENT
Start: 2023-09-07

## 2023-09-07 NOTE — TELEPHONE ENCOUNTER
Sister Aly Horton called and I checked, she is on PHI Form. He spent 2 days with her because he had an appt in the area she lives at. She said he needs his lexapro and he is about out and would like a two week supply to be sent to [address] and send the the rest to [facility] so he can get the three month supply. She said his moods and anxiety is so bad!       escitalopram (LEXAPRO) 10 MG tablet ; 2 weeks worth send to [facility] and send the rest to [facility] please. Please call Aly Horton @ [phone] if any issues, questions or concerns.  Thanks

## 2023-09-07 NOTE — TELEPHONE ENCOUNTER
Sent a request for #14 to [address] [place] [address] to be approved by Khushboo Vazquez LPN 4/4/2181 3:66 PM

## 2023-09-11 RX ORDER — ESCITALOPRAM OXALATE 10 MG/1
10 TABLET ORAL DAILY
Qty: 14 TABLET | Refills: 1 | OUTPATIENT
Start: 2023-09-11

## 2023-09-12 ENCOUNTER — PATIENT MESSAGE (OUTPATIENT)
Facility: CLINIC | Age: 63
End: 2023-09-12

## 2023-09-14 ENCOUNTER — CLINICAL DOCUMENTATION (OUTPATIENT)
Facility: CLINIC | Age: 63
End: 2023-09-14

## 2023-09-14 ENCOUNTER — OFFICE VISIT (OUTPATIENT)
Facility: CLINIC | Age: 63
End: 2023-09-14
Payer: COMMERCIAL

## 2023-09-14 VITALS
DIASTOLIC BLOOD PRESSURE: 75 MMHG | TEMPERATURE: 98.9 F | HEART RATE: 51 BPM | BODY MASS INDEX: 20.59 KG/M2 | SYSTOLIC BLOOD PRESSURE: 120 MMHG | HEIGHT: 72 IN | WEIGHT: 152 LBS | OXYGEN SATURATION: 97 % | RESPIRATION RATE: 20 BRPM

## 2023-09-14 DIAGNOSIS — M54.32 LEFT SIDED SCIATICA: Primary | ICD-10-CM

## 2023-09-14 PROCEDURE — 3078F DIAST BP <80 MM HG: CPT | Performed by: NURSE PRACTITIONER

## 2023-09-14 PROCEDURE — 3074F SYST BP LT 130 MM HG: CPT | Performed by: NURSE PRACTITIONER

## 2023-09-14 PROCEDURE — 99213 OFFICE O/P EST LOW 20 MIN: CPT | Performed by: NURSE PRACTITIONER

## 2023-09-14 RX ORDER — ACETAMINOPHEN 500 MG
1000 TABLET ORAL 3 TIMES DAILY
Qty: 180 TABLET | Refills: 5 | Status: SHIPPED | OUTPATIENT
Start: 2023-09-14

## 2023-09-14 RX ORDER — METHOCARBAMOL 750 MG/1
750 TABLET, FILM COATED ORAL 4 TIMES DAILY
Qty: 40 TABLET | Refills: 0 | Status: SHIPPED | OUTPATIENT
Start: 2023-09-14 | End: 2023-09-24

## 2023-09-14 NOTE — PROGRESS NOTES
Chief Complaint   Patient presents with    social security form     Gave patient completed and signed form for Social Security to manage benefits when he was in the office for his visit today  Anayeli Bernstein LPN 0/92/7806 14:81 AM

## 2023-09-14 NOTE — PROGRESS NOTES
Chief Complaint   Patient presents with    Back Pain     Pain starts in left buttock and radiates down the left leg - rates the pain at a 10, very miserable - asking about a muscle relaxer to [facility]     Constipation     Patient feels like hes not having normal Bms like he should be since hes taking Miralax daily

## 2023-09-14 NOTE — PATIENT INSTRUCTIONS
Muscle relaxant Robaxin prescribed. Tylenol ordered. Unfortunately, you are allergic to NSAIDS so I cannot prescribe them.     VICENTE Crockett - NP

## 2023-09-19 ENCOUNTER — HOSPITAL ENCOUNTER (OUTPATIENT)
Facility: HOSPITAL | Age: 63
Setting detail: RECURRING SERIES
Discharge: HOME | End: 2023-09-22
Payer: COMMERCIAL

## 2023-09-19 ENCOUNTER — TELEPHONE (OUTPATIENT)
Facility: CLINIC | Age: 63
End: 2023-09-19

## 2023-09-19 DIAGNOSIS — F10.27 ALCOHOL DEPENDENCE WITH ALCOHOL-INDUCED PERSISTING DEMENTIA (HCC): Chronic | ICD-10-CM

## 2023-09-19 DIAGNOSIS — F81.9 LEARNING DISABILITIES: Chronic | ICD-10-CM

## 2023-09-19 DIAGNOSIS — F10.21 ALCOHOL DEPENDENCE IN REMISSION (HCC): Chronic | ICD-10-CM

## 2023-09-19 DIAGNOSIS — F31.81 BIPOLAR II DISORDER, MOST RECENT EPISODE MAJOR DEPRESSIVE (HCC): ICD-10-CM

## 2023-09-19 PROCEDURE — 90853 GROUP PSYCHOTHERAPY: CPT

## 2023-09-19 PROCEDURE — 90792 PSYCH DIAG EVAL W/MED SRVCS: CPT | Performed by: PSYCHIATRY & NEUROLOGY

## 2023-09-19 RX ORDER — ESCITALOPRAM OXALATE 20 MG/1
20 TABLET ORAL DAILY
Qty: 90 TABLET | Refills: 3 | Status: SHIPPED | OUTPATIENT
Start: 2023-09-19

## 2023-09-19 RX ORDER — DIVALPROEX SODIUM 250 MG/1
250 TABLET, DELAYED RELEASE ORAL 2 TIMES DAILY
Qty: 180 TABLET | Refills: 3 | Status: SHIPPED | OUTPATIENT
Start: 2023-09-19

## 2023-09-19 NOTE — TELEPHONE ENCOUNTER
----- Message from Cassia Cespedes sent at 9/14/2023  4:02 PM EDT -----  Subject: Message to Provider    QUESTIONS  Information for Provider? Pt states that he wants a laxative sent to his   pharmacy of choice Please call pt to advise.   ---------------------------------------------------------------------------  --------------  [address]  [phone]; OK to leave message on voicemail  ---------------------------------------------------------------------------  --------------  SCRIPT ANSWERS  Relationship to Patient?  Self

## 2023-09-19 NOTE — H&P
day.  2.  I will increase the Lexapro to 20 mg daily - - #90 pills with three refills (90-day). 3.  Continue the Depakote at 250 mg b.i.d. - - #180 with three refills (90-day). 4.  Continue the trazodone at 50 mg at bedtime  - - he has suitable supplies available. 5.  Estimated length of stay in the [facility] would be 3-4 months with a transition to outpatient treatment.       Giuliano Gómez MD      [id number]  D:  09/19/2023 12:11  T:  09/19/2023 12:55  JOB #:  [id number]

## 2023-09-20 NOTE — SUICIDE SAFETY PLAN
SAFETY PLAN    A suicide Safety Plan is a document that supports someone when they are having thoughts of suicide. Warning Signs that indicate a suicidal crisis may be developing: What (situations, thoughts, feelings, body sensations, behaviors, etc.) do you experience that lets you know you are beginning to think about suicide? 1. Increased anger  2. Urges to lash out at something  3. Thoughts of not being here    Internal Coping Strategies:  What things can I do (relaxation techniques, hobbies, physical activities, etc.) to take my mind off my problems without contacting another person? 1. Listen to music  2. walk  3. Try to find something to do    People and social settings that provide distraction: Who can I call or where can I go to distract me? 1. Name: Call sister Margarita Danielson  Phone: in phone  2. Name:   Phone:    3. Place:   go outside          4. Place: take a bike ride    People whom I can ask for help: Who can I call when I need help - for example, friends, family, clergy, someone else? 1. Name: Yana Solano                Phone: in phone  2. Name: Niece (Soniya's daughter)  Phone: in phone  3. Name:   Phone:     Professionals or [facility] I can contact during a crisis: Who can I call for help - for example, my doctor, my psychiatrist, my psychologist, a mental health provider, a suicide hotline? 1. Clinician Name: Dominic Morataya   Phone: [phone]      Clinician Pager or Emergency Contact #:     2. Clinician Name:    Phone:       Clinician Pager or Emergency Contact #:     3. Suicide Prevention Lifeline: 8-101-398-BDMG (9848) or text hello to 216087    4. [address] [facility] Emergency Services -  for example, [address]  suicide hotline, [place] [address] [address] Hotline: [facility] 3       Emergency Services Address:       Emergency Services Phone: [phone]    Making the environment safe:  How can I make my environment

## 2023-09-21 ENCOUNTER — HOSPITAL ENCOUNTER (OUTPATIENT)
Facility: HOSPITAL | Age: 63
Setting detail: RECURRING SERIES
Discharge: HOME | End: 2023-09-24
Payer: COMMERCIAL

## 2023-09-21 PROCEDURE — 90853 GROUP PSYCHOTHERAPY: CPT

## 2023-09-21 NOTE — BH NOTE
Group Therapy Note    Date: 9/21/2023    Group Start Time: 10:00 AM  Group End Time: 10:50 AM  Group Topic: Psychotherapy    [address] [facility], [facility]        Group Therapy Note    Attendees: 10 scheduled     Focus of session was on understanding and implementing the worry tree as a way to eliminate worry about things in our lives that we currently cannot do anything about. We discussed how about 90% of our current worries are about the unimportant, the unlikely, and the unresolved. I shared with group how the worry tree can be effective for dealing with worries to let them go, even if they have to do it over and over. I had group members share a worry that is able to be resolved and one that is not at this time and how to practice letting go of that worry in order to free up emotional energy and space in our minds. Group members were given an assignment to let go of one worry this week that is resolvable or one that is not. Behavioral Health Daily Check In form revealed that pt is not experiencing SI/HI thoughts or plans, remaining abstinent from drugs and alcohol, and reports goal today trying to make it. Patient's Goal:  1UM F31.81 I  Pt will share times in which he has overcome difficult emotional moments and the skills he can see that he used    Notes: Pt shared that he was not able to get much sleep last night as his back was hurting. He uses mail order prescriptions and his medication and not come in. He has been up since 2am and is tired. He stated that he has been going to PT but it doesn't seem to be working at this time. The group was supportive and shared strategies they have used in the past.      Status After Intervention:  Improved    Participation Level:  Active Listener and Interactive    Participation Quality: Appropriate, Attentive, and Sharing      Speech:  normal      Thought Process/Content:

## 2023-09-26 ENCOUNTER — HOSPITAL ENCOUNTER (OUTPATIENT)
Facility: HOSPITAL | Age: 63
Setting detail: RECURRING SERIES
Discharge: HOME | End: 2023-09-29
Payer: COMMERCIAL

## 2023-09-26 PROCEDURE — 90853 GROUP PSYCHOTHERAPY: CPT

## 2023-09-26 NOTE — BH NOTE
PSYCHOSOCIAL ASSESSMENT  :Patient identifying info:   Mona Sagastume is a 63 y.o., male admitted 9/26/2023  7:59 AM     Presenting problem and precipitating factors: Calvin Ramey has been in individual therapy for several months due to issues with managing his mood which has been ranging from periods of depression to being angry. He has been angry to the point of punching holes in the wall of his house. He has been struggling with managing his impulses and his emotions. He has a past history of drug abuse and he recently stopped drinking alcohol. He currently lives alone in a old family home and he has little access to resources such as outlets for communication and support. He moved back here to help his parents and they passed in 2010 and 2012 and he has noticed his emotional problems have increased over the past 10+years  He has recently been approved for social security disability which will help with a lot of his emotional issues due to the amount of stress he has been experiencing as a result. His PCP had started medications and he has been taking Lexapro and Depakote and reports positive affects from it. 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 He believes he is getting more depressed and anxious despite of the medications and the good news of his SS benefits due to his isolation and loneliness. He is well supported by one sister in particular who has helped him over the past several years \"but I know I make her mad because I make stupid choices. \"  He agreed coming to [facility] may be of help to help continue to stabilize his mood and prevent further setbacks in depression and anxiety. He reports he worries \"all the time\" and he spoke about how his mind races consistently. He does report having suicidal thoughts in the past but not in the past few weeks. He has never had any attempts.   He has limited cognitive

## 2023-09-27 ENCOUNTER — TELEPHONE (OUTPATIENT)
Facility: CLINIC | Age: 63
End: 2023-09-27

## 2023-09-27 ENCOUNTER — HOSPITAL ENCOUNTER (OUTPATIENT)
Facility: HOSPITAL | Age: 63
Setting detail: RECURRING SERIES
Discharge: HOME | End: 2023-09-30
Payer: COMMERCIAL

## 2023-09-27 DIAGNOSIS — F41.9 ANXIETY: Primary | ICD-10-CM

## 2023-09-27 PROCEDURE — 90853 GROUP PSYCHOTHERAPY: CPT

## 2023-09-28 ENCOUNTER — HOSPITAL ENCOUNTER (OUTPATIENT)
Facility: HOSPITAL | Age: 63
Setting detail: RECURRING SERIES
End: 2023-09-28
Payer: COMMERCIAL

## 2023-09-28 PROCEDURE — 90853 GROUP PSYCHOTHERAPY: CPT

## 2023-09-28 RX ORDER — POLYETHYLENE GLYCOL 3350 17 G/17G
17 POWDER, FOR SOLUTION ORAL DAILY PRN
Qty: 510 G | Refills: 0 | Status: SHIPPED | OUTPATIENT
Start: 2023-09-28 | End: 2023-10-28

## 2023-10-04 ENCOUNTER — HOSPITAL ENCOUNTER (OUTPATIENT)
Facility: HOSPITAL | Age: 63
Setting detail: RECURRING SERIES
Discharge: HOME | End: 2023-10-07
Payer: COMMERCIAL

## 2023-10-04 PROCEDURE — 90853 GROUP PSYCHOTHERAPY: CPT

## 2023-10-05 ENCOUNTER — HOSPITAL ENCOUNTER (OUTPATIENT)
Facility: HOSPITAL | Age: 63
Setting detail: RECURRING SERIES
Discharge: HOME | End: 2023-10-08
Payer: COMMERCIAL

## 2023-10-05 PROCEDURE — 90853 GROUP PSYCHOTHERAPY: CPT

## 2023-10-09 ENCOUNTER — HOSPITAL ENCOUNTER (OUTPATIENT)
Facility: HOSPITAL | Age: 63
Setting detail: RECURRING SERIES
Discharge: HOME | End: 2023-10-12
Payer: COMMERCIAL

## 2023-10-09 PROCEDURE — 90853 GROUP PSYCHOTHERAPY: CPT

## 2023-10-09 NOTE — BH NOTE
Group Therapy Note    Date: 10/9/2023    Group Start Time: 10:00 AM  Group End Time: 10:50 AM  Group Topic: Psychotherapy    [address] BEHAVIORAL [facility] OP    [address], [facility], [address] [facility]        Group Therapy Note    Attendees: Focus of session was a review of the weekend and successes that can be identified in recognizing and managing triggers to negative feeling states. We spoke about what was done in managing triggers that led to success and what was done or not done that led to struggles in maintaining and getting through difficult moments. We also spoke about goals to work towards this week and what is reasonable to accomplish by the end of the week. Behavioral Health Daily Check In form revealed that pt is not experiencing SI/HI thoughts or plans, remaining abstinent from drugs and alcohol, and reports goal today to be a better person    Patient's Goal:  1Um F31.81     Notes:  Pt shared that he had completed many task yesterday that he wanted to get done. Doing these things around his house made him feel better physically and also mentally. It was apparent that pt was pleased with his efforts and his affect was bright today His house does not have insulation so he prepares for cold weather usually heating one room .        Status After Intervention:  Improved    Participation Level l: Active Listener and Interactive    Participation Quality: Appropriate, Attentive, and Sharing      Speech:  normal      Thought Process/Content: Logical      Affective Functioning: Congruent      Mood: brighter      Level of consciousness:  Alert and Attentive      Response to Learning: Able to verbalize current knowledge/experience, Able to verbalize/acknowledge new learning, Able to retain information, Capable of insight, Able to change behavior, and Progressing to goal      Modes of Intervention: Support, Exploration, and Clarifying      Discipline Responsible:

## 2023-10-11 ENCOUNTER — HOSPITAL ENCOUNTER (OUTPATIENT)
Facility: HOSPITAL | Age: 63
Setting detail: RECURRING SERIES
Discharge: HOME | End: 2023-10-14
Payer: COMMERCIAL

## 2023-10-11 PROCEDURE — 90853 GROUP PSYCHOTHERAPY: CPT

## 2023-10-12 ENCOUNTER — HOSPITAL ENCOUNTER (OUTPATIENT)
Facility: HOSPITAL | Age: 63
Setting detail: RECURRING SERIES
End: 2023-10-12
Payer: COMMERCIAL

## 2023-10-12 DIAGNOSIS — F31.81 BIPOLAR II DISORDER, MOST RECENT EPISODE MAJOR DEPRESSIVE (HCC): ICD-10-CM

## 2023-10-12 DIAGNOSIS — F10.27 ALCOHOL DEPENDENCE WITH ALCOHOL-INDUCED PERSISTING DEMENTIA (HCC): Primary | Chronic | ICD-10-CM

## 2023-10-12 DIAGNOSIS — F81.9 LEARNING DISABILITIES: Chronic | ICD-10-CM

## 2023-10-12 PROCEDURE — 90853 GROUP PSYCHOTHERAPY: CPT

## 2023-10-12 PROCEDURE — 99214 OFFICE O/P EST MOD 30 MIN: CPT | Performed by: PSYCHIATRY & NEUROLOGY

## 2023-10-12 RX ORDER — ESCITALOPRAM OXALATE 20 MG/1
20 TABLET ORAL DAILY
Qty: 90 TABLET | Refills: 3 | Status: SHIPPED | OUTPATIENT
Start: 2023-10-12

## 2023-10-12 RX ORDER — ARIPIPRAZOLE 2 MG/1
2 TABLET ORAL DAILY
Qty: 90 TABLET | Refills: 0 | Status: SHIPPED | OUTPATIENT
Start: 2023-10-12

## 2023-10-12 NOTE — PROGRESS NOTES
[facility] PROGRESS NOTE    INTERVAL HISTORY/FINDINGS:  States he's been feeling \"OK\" affectively, and that the increased dose of Lexapro seems to help his mood slightly. However, he's still far from euthymic and we discussed Rx options to consider, and the likely time-course of various interventions. He's still having some moderate N/V dysfunction--sleep is fair (only with Trazodone), appetite remains poor, and he still only weighs #145 (down #15 from baseline). Also still anhedonic, but does find that coming here is very helpful, emotionally. No SE's with the current meds and he states he often forgets to take the Trazodone at night which causes him to struggle with sleeping. Will augment with Abilify at 2 mg daily--have to order mail-order meds due to having no transportation to get to the pharmacy. MEDICATIONS:  Current Outpatient Medications   Medication Sig    polyethylene glycol (GLYCOLAX) 17 GM/SCOOP powder Take 17 g by mouth daily as needed (for constipation)    escitalopram (LEXAPRO) 20 MG tablet Take 1 tablet by mouth daily    divalproex (DEPAKOTE) 250 MG DR tablet Take 1 tablet by mouth 2 times daily    acetaminophen (TYLENOL) 500 MG tablet Take 2 tablets by mouth 3 times daily    cetirizine (ZYRTEC) 10 MG tablet Take 1 tablet by mouth daily    acetaminophen (TYLENOL) 325 MG tablet Take 2 tablets by mouth every 6 hours as needed for Pain    lisinopril (PRINIVIL;ZESTRIL) 20 MG tablet Take 1 tablet by mouth daily    amLODIPine (NORVASC) 10 MG tablet Take 1 tablet by mouth daily    sucralfate (CARAFATE) 1 GM tablet     traZODone (DESYREL) 50 MG tablet Take 1 tablet by mouth nightly    omeprazole (PRILOSEC) 40 MG delayed release capsule Take 1 capsule by mouth 2 times daily    polyethylene glycol (GLYCOLAX) 17 GM/SCOOP powder Take 17 g by mouth daily     No current facility-administered medications for this encounter.        MENTAL STATUS UPDATE: (Positives in [facility])  Appearance:   Neat   Disheveled  Odiferous

## 2023-10-13 ENCOUNTER — TELEPHONE (OUTPATIENT)
Facility: CLINIC | Age: 63
End: 2023-10-13

## 2023-10-13 RX ORDER — POLYETHYLENE GLYCOL 3350 17 G/17G
17 POWDER, FOR SOLUTION ORAL DAILY
Qty: 60 EACH | Refills: 0 | Status: SHIPPED | OUTPATIENT
Start: 2023-10-13 | End: 2023-12-12

## 2023-10-16 ENCOUNTER — APPOINTMENT (OUTPATIENT)
Facility: HOSPITAL | Age: 63
End: 2023-10-16
Payer: COMMERCIAL

## 2023-10-16 PROCEDURE — 90853 GROUP PSYCHOTHERAPY: CPT

## 2023-10-17 ENCOUNTER — HOSPITAL ENCOUNTER (EMERGENCY)
Facility: HOSPITAL | Age: 63
Discharge: HOME | End: 2023-10-17
Attending: EMERGENCY MEDICINE
Payer: COMMERCIAL

## 2023-10-17 ENCOUNTER — APPOINTMENT (OUTPATIENT)
Facility: HOSPITAL | Age: 63
End: 2023-10-17
Payer: COMMERCIAL

## 2023-10-17 VITALS
BODY MASS INDEX: 19.64 KG/M2 | OXYGEN SATURATION: 98 % | DIASTOLIC BLOOD PRESSURE: 80 MMHG | WEIGHT: 145 LBS | TEMPERATURE: 97.9 F | HEIGHT: 72 IN | HEART RATE: 60 BPM | RESPIRATION RATE: 18 BRPM | SYSTOLIC BLOOD PRESSURE: 151 MMHG

## 2023-10-17 DIAGNOSIS — M54.32 SCIATICA OF LEFT SIDE: Primary | ICD-10-CM

## 2023-10-17 PROCEDURE — 99284 EMERGENCY DEPT VISIT MOD MDM: CPT

## 2023-10-17 PROCEDURE — 90853 GROUP PSYCHOTHERAPY: CPT

## 2023-10-17 PROCEDURE — 96372 THER/PROPH/DIAG INJ SC/IM: CPT

## 2023-10-17 PROCEDURE — 6360000002 HC RX W HCPCS: Performed by: EMERGENCY MEDICINE

## 2023-10-17 RX ORDER — KETOROLAC TROMETHAMINE 30 MG/ML
30 INJECTION, SOLUTION INTRAMUSCULAR; INTRAVENOUS
Status: COMPLETED | OUTPATIENT
Start: 2023-10-17 | End: 2023-10-17

## 2023-10-17 RX ORDER — HYDROCODONE BITARTRATE AND ACETAMINOPHEN 5; 325 MG/1; MG/1
1 TABLET ORAL EVERY 6 HOURS PRN
Qty: 12 TABLET | Refills: 0 | Status: SHIPPED | OUTPATIENT
Start: 2023-10-17 | End: 2023-10-20

## 2023-10-17 RX ORDER — METHYLPREDNISOLONE 4 MG/1
TABLET ORAL
Qty: 1 KIT | Refills: 0 | Status: SHIPPED | OUTPATIENT
Start: 2023-10-17 | End: 2023-10-23

## 2023-10-17 RX ORDER — DEXAMETHASONE SODIUM PHOSPHATE 10 MG/ML
8 INJECTION, SOLUTION INTRAMUSCULAR; INTRAVENOUS ONCE
Status: COMPLETED | OUTPATIENT
Start: 2023-10-17 | End: 2023-10-17

## 2023-10-17 RX ADMIN — DEXAMETHASONE SODIUM PHOSPHATE 8 MG: 10 INJECTION, SOLUTION INTRAMUSCULAR; INTRAVENOUS at 10:45

## 2023-10-17 RX ADMIN — KETOROLAC TROMETHAMINE 30 MG: 30 INJECTION, SOLUTION INTRAMUSCULAR at 10:45

## 2023-10-17 ASSESSMENT — LIFESTYLE VARIABLES
HOW MANY STANDARD DRINKS CONTAINING ALCOHOL DO YOU HAVE ON A TYPICAL DAY: PATIENT DOES NOT DRINK
HOW OFTEN DO YOU HAVE A DRINK CONTAINING ALCOHOL: NEVER

## 2023-10-17 ASSESSMENT — ENCOUNTER SYMPTOMS
SHORTNESS OF BREATH: 0
ABDOMINAL PAIN: 0
VOMITING: 0
DIARRHEA: 0
SORE THROAT: 0
NAUSEA: 0
EYE REDNESS: 0
BACK PAIN: 1
COUGH: 0

## 2023-10-17 ASSESSMENT — PAIN DESCRIPTION - DIRECTION: RADIATING_TOWARDS: LEFT LEG

## 2023-10-17 ASSESSMENT — PAIN SCALES - GENERAL
PAINLEVEL_OUTOF10: 0
PAINLEVEL_OUTOF10: 10

## 2023-10-17 ASSESSMENT — PAIN DESCRIPTION - ORIENTATION: ORIENTATION: LOWER

## 2023-10-17 ASSESSMENT — PAIN - FUNCTIONAL ASSESSMENT: PAIN_FUNCTIONAL_ASSESSMENT: 0-10

## 2023-10-17 ASSESSMENT — PAIN DESCRIPTION - LOCATION: LOCATION: BACK

## 2023-10-17 ASSESSMENT — PAIN DESCRIPTION - DESCRIPTORS: DESCRIPTORS: SHARP;SHOOTING

## 2023-10-17 NOTE — ED TRIAGE NOTES
Pt arrived by POV for lower back pain. Pt reports he has been in physical therapy for 2 months for his sciatica. Pt repots the pain starts in his lower back and radiates down his left leg. Pt reports  he needs a Cortizone shot. Pt reports he has been taking tylenol and his last dose was at 0300.   Pt ambulated with a steady gait  pt educated on ER flow

## 2023-10-17 NOTE — ED NOTES
D/C instructions provided and reviewed with patient. Opportunity provided for discussion. All questions answered nothing further at this time.        Cassi Rowe RN  10/17/23 [id number]

## 2023-10-17 NOTE — ED PROVIDER NOTES
EMERGENCY DEPARTMENT HISTORY AND PHYSICAL EXAM      Date: 10/17/2023  Patient Name: Nida Turner    History of Presenting Illness     Chief Complaint   Patient presents with    Back Pain       History Provided By: Patient    HPI: Nida Turner, 63 y.o. male with past medical history listed below, presents via private vehicle to the ED with cc of left low back pain. Patient has a history of sciatica and is currently in physical therapy for the same. He reports for the past 2 weeks he has had worsening pain in his left side. He has been taking Tylenol and aspirin with minimal relief. No recent falls or trauma. Pain similar to prior. Pain is in his left low back radiating into his buttock and down his left upper leg. Pain does not go down to the foot. Denies any lower extremity weakness numbness or tingling. No change in bowel or bladder habits. There are no other complaints, changes, or physical findings at this time. PCP: VICENTE Bergman NP    No current facility-administered medications on file prior to encounter.      Current Outpatient Medications on File Prior to Encounter   Medication Sig Dispense Refill    polyethylene glycol (GLYCOLAX) 17 GM/SCOOP powder Take 17 g by mouth daily for 60 doses 60 each 0    escitalopram (LEXAPRO) 20 MG tablet Take 1 tablet by mouth daily 90 tablet 3    ARIPiprazole (ABILIFY) 2 MG tablet Take 1 tablet by mouth daily 90 tablet 0    polyethylene glycol (GLYCOLAX) 17 GM/SCOOP powder Take 17 g by mouth daily as needed (for constipation) 510 g 0    divalproex (DEPAKOTE) 250 MG DR tablet Take 1 tablet by mouth 2 times daily 180 tablet 3    acetaminophen (TYLENOL) 500 MG tablet Take 2 tablets by mouth 3 times daily 180 tablet 5    cetirizine (ZYRTEC) 10 MG tablet Take 1 tablet by mouth daily 90 tablet 1    acetaminophen (TYLENOL) 325 MG tablet Take 2 tablets by mouth every 6 hours as needed for Pain      lisinopril (PRINIVIL;ZESTRIL) 20 MG tablet

## 2023-10-19 ENCOUNTER — APPOINTMENT (OUTPATIENT)
Facility: HOSPITAL | Age: 63
End: 2023-10-19
Payer: COMMERCIAL

## 2023-10-19 PROCEDURE — 90853 GROUP PSYCHOTHERAPY: CPT

## 2023-10-20 ENCOUNTER — APPOINTMENT (OUTPATIENT)
Facility: HOSPITAL | Age: 63
End: 2023-10-20
Payer: COMMERCIAL

## 2023-10-22 ASSESSMENT — ENCOUNTER SYMPTOMS
COUGH: 0
WHEEZING: 0
BACK PAIN: 1
DIARRHEA: 0
CHEST TIGHTNESS: 0
SHORTNESS OF BREATH: 0
NAUSEA: 0
CONSTIPATION: 0
ABDOMINAL PAIN: 0

## 2023-10-24 ENCOUNTER — HOSPITAL ENCOUNTER (OUTPATIENT)
Facility: HOSPITAL | Age: 63
Setting detail: RECURRING SERIES
Discharge: HOME | End: 2023-10-27
Payer: COMMERCIAL

## 2023-10-24 PROCEDURE — 90853 GROUP PSYCHOTHERAPY: CPT

## 2023-10-24 NOTE — TELEPHONE ENCOUNTER
----- Message from Erik De Leon sent at 10/11/2023  4:00 PM EDT -----  Subject: Refill Request    QUESTIONS  Name of Medication? polyethylene glycol (GLYCOLAX) 17 GM/SCOOP powder  Patient-reported dosage and instructions? as needed for constipation  How many days do you have left? 0  Preferred Pharmacy? [facility] [address] phone number (if available)? [phone]  ---------------------------------------------------------------------------  --------------  Elly WOMACK  What is the best way for the office to contact you? OK to leave message on   voicemail  Preferred Call Back Phone Number? [phone]  ---------------------------------------------------------------------------  --------------  SCRIPT ANSWERS  Relationship to Patient?  Self

## 2023-10-25 ENCOUNTER — TELEPHONE (OUTPATIENT)
Facility: CLINIC | Age: 63
End: 2023-10-25

## 2023-10-25 NOTE — TELEPHONE ENCOUNTER
Pt calling to let Markie Allisony know miralax does not work for him, he needs a laxative called in as soon as possible.

## 2023-10-25 NOTE — TELEPHONE ENCOUNTER
----- Message from Burke Barber sent at 10/12/2023  5:01 PM EDT -----  Subject: Message to Provider    QUESTIONS  Information for Provider? Patient would like for Anaya Leger to send his   prescription for his laxative from Dr. Ирина Salas to the [facility] mail order,   [facility] [address] [facility], [address] [facility]. Please call and   let him know when you have sent the prescription over. ---------------------------------------------------------------------------  --------------  [facility]  [phone]; OK to leave message on voicemail  ---------------------------------------------------------------------------  --------------  SCRIPT ANSWERS  Relationship to Patient?  Self

## 2023-10-26 ENCOUNTER — HOSPITAL ENCOUNTER (OUTPATIENT)
Facility: HOSPITAL | Age: 63
Setting detail: RECURRING SERIES
Discharge: HOME | End: 2023-10-29
Payer: COMMERCIAL

## 2023-10-26 PROCEDURE — 90853 GROUP PSYCHOTHERAPY: CPT

## 2023-10-27 ENCOUNTER — HOSPITAL ENCOUNTER (OUTPATIENT)
Facility: HOSPITAL | Age: 63
Setting detail: RECURRING SERIES
Discharge: HOME | End: 2023-10-30
Payer: COMMERCIAL

## 2023-10-27 PROCEDURE — 90853 GROUP PSYCHOTHERAPY: CPT

## 2023-10-27 NOTE — BH NOTE
Group Therapy Note    Date: 10/27/2023    Group Start Time: 10:00 AM  Group End Time: 10:50 AM  Group Topic: Psychotherapy    [address] [facility]    Biranne Gray, [place]        Group Therapy Note    Attendees: 7 scheduled    Focus of session was on developing healthy boundaries with others and the signs of healthy and unhealthy boundaries. We explored current relationships with group members and identified what kinds of boundaries are present and what boundaries need to be set. We spoke about physical, emotional, time, and intimate boundaries that we can set. We also spoke about how to allow others to set boundaries with us and to work to equip them with how to support in times of need and/or crisis. We identified what changes we can make today. Behavioral Health Daily Check In form revealed that patient is not experiencing SI/HI thoughts or plans, remaining abstinent from drugs and alcohol, and report's goal today of   \"to work around my house and get fresh air. \"      Patient's Goal:  1 UM F 31.81  J. Pt will verbally express understanding of the relationship between depressed mood & repression of feelings such as anger, hurt, and sadness. Notes:  Odalys Heller participated well in group and he shared how he spoke with his sister yesterday and was able to talk to her about their conversation yesterday. He spoke about he is \"calm today and I am not shaking at all. \"      Status After Intervention:  Improved    Participation Level:  Active Listener and Interactive    Participation Quality: Appropriate, Attentive, Sharing, and Supportive      Speech:  normal      Thought Process/Content: Logical  Linear      Affective Functioning: Congruent      Mood: anxious      Level of consciousness:  Alert, Oriented x4, and Attentive      Response to Learning: Able to verbalize current knowledge/experience and Able to retain information      Endings:

## 2023-10-31 ENCOUNTER — APPOINTMENT (OUTPATIENT)
Facility: HOSPITAL | Age: 63
End: 2023-10-31
Payer: COMMERCIAL

## 2023-10-31 ENCOUNTER — HOSPITAL ENCOUNTER (OUTPATIENT)
Facility: HOSPITAL | Age: 63
Setting detail: RECURRING SERIES
Discharge: HOME | End: 2023-11-03
Payer: COMMERCIAL

## 2023-10-31 PROCEDURE — 90853 GROUP PSYCHOTHERAPY: CPT

## 2023-11-01 ENCOUNTER — OFFICE VISIT (OUTPATIENT)
Facility: CLINIC | Age: 63
End: 2023-11-01

## 2023-11-01 VITALS
RESPIRATION RATE: 20 BRPM | DIASTOLIC BLOOD PRESSURE: 88 MMHG | WEIGHT: 153 LBS | SYSTOLIC BLOOD PRESSURE: 145 MMHG | HEART RATE: 70 BPM | HEIGHT: 72 IN | OXYGEN SATURATION: 97 % | BODY MASS INDEX: 20.72 KG/M2 | TEMPERATURE: 98.2 F
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
## 2023-11-01 DIAGNOSIS — I10 PRIMARY HYPERTENSION: ICD-10-CM

## 2023-11-01 DIAGNOSIS — R53.83 OTHER FATIGUE: ICD-10-CM

## 2023-11-01 DIAGNOSIS — Z13.220 SCREENING FOR HYPERLIPIDEMIA: ICD-10-CM

## 2023-11-01 DIAGNOSIS — R73.9 HYPERGLYCEMIA: ICD-10-CM

## 2023-11-01 DIAGNOSIS — Z13.29 SCREENING FOR HYPOTHYROIDISM: ICD-10-CM

## 2023-11-01 DIAGNOSIS — M54.32 LEFT SIDED SCIATICA: ICD-10-CM

## 2023-11-01 DIAGNOSIS — Z23 NEEDS FLU SHOT: ICD-10-CM

## 2023-11-01 DIAGNOSIS — C61 PROSTATE CANCER (HCC): ICD-10-CM

## 2023-11-01 RX ORDER — LIDOCAINE 4 G/G
1 PATCH TOPICAL DAILY
Qty: 30 PATCH | Refills: 0 | Status: SHIPPED | OUTPATIENT
Start: 2023-11-01 | End: 2023-12-01

## 2023-11-01 RX ORDER — NAPROXEN 500 MG/1
500 TABLET ORAL 2 TIMES DAILY WITH MEALS
Qty: 60 TABLET | Refills: 0 | Status: SHIPPED | OUTPATIENT
Start: 2023-11-01

## 2023-11-01 NOTE — PATIENT INSTRUCTIONS
Naproxen prescribed. Lidocaine patch prescribed. Please go to [facility] and have labs drawn.      VICENTE Diaz NP
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
## 2023-11-02 ENCOUNTER — HOSPITAL ENCOUNTER (OUTPATIENT)
Facility: HOSPITAL | Age: 63
Setting detail: RECURRING SERIES
Discharge: HOME | End: 2023-11-05
Payer: COMMERCIAL

## 2023-11-02 LAB
25(OH)D3+25(OH)D2 SERPL-MCNC: 74.7 NG/ML (ref 30–100)
ALBUMIN SERPL-MCNC: 4.4 G/DL (ref 3.8–4.9)
ALBUMIN/GLOB SERPL: 2.2 {RATIO} (ref 1.2–2.2)
ALP SERPL-CCNC: 89 IU/L (ref 44–121)
ALT SERPL-CCNC: 18 IU/L (ref 0–44)
AST SERPL-CCNC: 26 IU/L (ref 0–40)
BASOPHILS # BLD AUTO: 0.1 X10E3/UL (ref 0–0.2)
BASOPHILS NFR BLD AUTO: 1 %
BILIRUB SERPL-MCNC: 0.3 MG/DL (ref 0–1.2)
BUN SERPL-MCNC: 10 MG/DL (ref 8–27)
BUN/CREAT SERPL: 15 (ref 10–24)
CALCIUM SERPL-MCNC: 8.9 MG/DL (ref 8.6–10.2)
CHLORIDE SERPL-SCNC: 99 MMOL/L (ref 96–106)
CHOLEST SERPL-MCNC: 143 MG/DL (ref 100–199)
CO2 SERPL-SCNC: 27 MMOL/L (ref 20–29)
CREAT SERPL-MCNC: 0.65 MG/DL (ref 0.76–1.27)
EGFRCR SERPLBLD CKD-EPI 2021: 108 ML/MIN/1.73
EOSINOPHIL # BLD AUTO: 0.2 X10E3/UL (ref 0–0.4)
EOSINOPHIL NFR BLD AUTO: 2 %
ERYTHROCYTE [DISTWIDTH] IN BLOOD BY AUTOMATED COUNT: 14.1 % (ref 11.6–15.4)
GLOBULIN SER CALC-MCNC: 2 G/DL (ref 1.5–4.5)
GLUCOSE SERPL-MCNC: 101 MG/DL (ref 70–99)
HBA1C MFR BLD: 5.7 % (ref 4.8–5.6)
HCT VFR BLD AUTO: 33.3 % (ref 37.5–51)
HDLC SERPL-MCNC: 60 MG/DL
HGB BLD-MCNC: 10.9 G/DL (ref 13–17.7)
IMM GRANULOCYTES # BLD AUTO: 0.1 X10E3/UL (ref 0–0.1)
IMM GRANULOCYTES NFR BLD AUTO: 1 %
IMP & REVIEW OF LAB RESULTS: NORMAL
LDLC SERPL CALC-MCNC: 68 MG/DL (ref 0–99)
LYMPHOCYTES # BLD AUTO: 1.4 X10E3/UL (ref 0.7–3.1)
LYMPHOCYTES NFR BLD AUTO: 14 %
MCH RBC QN AUTO: 29.7 PG (ref 26.6–33)
MCHC RBC AUTO-ENTMCNC: 32.7 G/DL (ref 31.5–35.7)
MCV RBC AUTO: 91 FL (ref 79–97)
MONOCYTES # BLD AUTO: 0.6 X10E3/UL (ref 0.1–0.9)
MONOCYTES NFR BLD AUTO: 6 %
NEUTROPHILS # BLD AUTO: 7.7 X10E3/UL (ref 1.4–7)
NEUTROPHILS NFR BLD AUTO: 76 %
PLATELET # BLD AUTO: 380 X10E3/UL (ref 150–450)
POTASSIUM SERPL-SCNC: 4.3 MMOL/L (ref 3.5–5.2)
PROT SERPL-MCNC: 6.4 G/DL (ref 6–8.5)
PSA SERPL-MCNC: 0.3 NG/ML (ref 0–4)
RBC # BLD AUTO: 3.67 X10E6/UL (ref 4.14–5.8)
SODIUM SERPL-SCNC: 140 MMOL/L (ref 134–144)
SPECIMEN STATUS REPORT: NORMAL
TRIGL SERPL-MCNC: 79 MG/DL (ref 0–149)
TSH SERPL DL<=0.005 MIU/L-ACNC: 1.77 UIU/ML (ref 0.45–4.5)
VIT B12 SERPL-MCNC: 485 PG/ML (ref 232–1245)
VLDLC SERPL CALC-MCNC: 15 MG/DL (ref 5–40)
WBC # BLD AUTO: 10 X10E3/UL (ref 3.4–10.8)

## 2023-11-02 PROCEDURE — 90853 GROUP PSYCHOTHERAPY: CPT

## 2023-11-03 ENCOUNTER — HOSPITAL ENCOUNTER (OUTPATIENT)
Facility: HOSPITAL | Age: 63
Setting detail: RECURRING SERIES
Discharge: HOME | End: 2023-11-06
Payer: COMMERCIAL

## 2023-11-03 PROCEDURE — 90853 GROUP PSYCHOTHERAPY: CPT

## 2023-11-07 ENCOUNTER — HOSPITAL ENCOUNTER (OUTPATIENT)
Facility: HOSPITAL | Age: 63
Setting detail: RECURRING SERIES
Discharge: HOME | End: 2023-11-10
Payer: COMMERCIAL

## 2023-11-07 PROCEDURE — 90853 GROUP PSYCHOTHERAPY: CPT

## 2023-11-09 ENCOUNTER — HOSPITAL ENCOUNTER (OUTPATIENT)
Facility: HOSPITAL | Age: 63
Setting detail: RECURRING SERIES
End: 2023-11-09
Payer: COMMERCIAL

## 2023-11-09 PROCEDURE — 90853 GROUP PSYCHOTHERAPY: CPT

## 2023-11-10 ENCOUNTER — HOSPITAL ENCOUNTER (OUTPATIENT)
Facility: HOSPITAL | Age: 63
Setting detail: RECURRING SERIES
End: 2023-11-10
Payer: COMMERCIAL

## 2023-11-10 DIAGNOSIS — F31.81 BIPOLAR II DISORDER, MOST RECENT EPISODE MAJOR DEPRESSIVE (HCC): Primary | ICD-10-CM

## 2023-11-10 DIAGNOSIS — F10.21 ALCOHOL DEPENDENCE IN REMISSION (HCC): Chronic | ICD-10-CM

## 2023-11-10 DIAGNOSIS — F10.27 ALCOHOL DEPENDENCE WITH ALCOHOL-INDUCED PERSISTING DEMENTIA (HCC): Chronic | ICD-10-CM

## 2023-11-10 PROCEDURE — 99214 OFFICE O/P EST MOD 30 MIN: CPT | Performed by: PSYCHIATRY & NEUROLOGY

## 2023-11-10 PROCEDURE — 90853 GROUP PSYCHOTHERAPY: CPT

## 2023-11-10 RX ORDER — TRAZODONE HYDROCHLORIDE 100 MG/1
100 TABLET ORAL NIGHTLY
Qty: 90 TABLET | Refills: 1 | Status: SHIPPED | OUTPATIENT
Start: 2023-11-10 | End: 2024-05-08

## 2023-11-10 NOTE — PROGRESS NOTES
SOP PROGRESS NOTE    INTERVAL HISTORY/FINDINGS:   States he's been feeling a little better, overall, and that he thinks the Abilify has helped to bump his mood a bit. He feels roughly 50% better overall, and his N/V functioning has improved further, except he's still having some middle insomnia. Discussed Rx options and we'll increase the Trazodone to 100 mg QHS, and continue the Lexapro, VPA, and Abilify for now. No mood instability or hypomanic symptoms at all. Still stuck at home, but coming to [facility] really helps him, emotionally. He's eating better with the Abilify and now weighs #150 (up #5 from last time). Not nearly as anhedonic, and he denies any SI or full hopelessness. No SE's with the current meds, at all. Have to order mail-order meds due to having no transportation to get to the pharmacy.     MEDICATIONS:  Current Outpatient Medications   Medication Sig    naproxen (NAPROSYN) 500 MG tablet Take 1 tablet by mouth 2 times daily (with meals)    lidocaine 4 % external patch Place 1 patch onto the skin daily    polyethylene glycol (GLYCOLAX) 17 GM/SCOOP powder Take 17 g by mouth daily for 60 doses    escitalopram (LEXAPRO) 20 MG tablet Take 1 tablet by mouth daily    ARIPiprazole (ABILIFY) 2 MG tablet Take 1 tablet by mouth daily    divalproex (DEPAKOTE) 250 MG DR tablet Take 1 tablet by mouth 2 times daily    acetaminophen (TYLENOL) 500 MG tablet Take 2 tablets by mouth 3 times daily    cetirizine (ZYRTEC) 10 MG tablet Take 1 tablet by mouth daily    acetaminophen (TYLENOL) 325 MG tablet Take 2 tablets by mouth every 6 hours as needed for Pain    lisinopril (PRINIVIL;ZESTRIL) 20 MG tablet Take 1 tablet by mouth daily    amLODIPine (NORVASC) 10 MG tablet Take 1 tablet by mouth daily    sucralfate (CARAFATE) 1 GM tablet     traZODone (DESYREL) 50 MG tablet Take 1 tablet by mouth nightly    omeprazole (PRILOSEC) 40 MG delayed release capsule Take 1 capsule by mouth 2 times daily     No current facility-administered medications for this encounter. MENTAL STATUS UPDATE: (Positives in [facility])  Appearance:   Neat   Disheveled  Odiferous   Appropriate  Orientation:   Time  Place  Person  Speech:   Coherent  Pressured  Garbled/Slurred  Loud   Soft  Mute  Memory:   Intact  Impaired (Recent  Remote)--Mild  Severe  Mood:   Euthymic  Depressed--mild  Anxious  Elated  Affect:    Appropriate  Inappropriate  Labile  Blunted  Flat  Thought Content:   Logical/Appropriate  Paranoid  Delusional  Illogical IOR  SI  HI  Thought Process:   Coherent/linear  Tangential  Loose/Disorganized   Hallucinations:   None  Auditory  Visual  Tactile  Olfactory  Gustatory  Insight:   Good  Fair  Impaired      Judgment:   Good  Fair  Impaired     CONTINUED NEED FOR TREATMENT: (Positives in [facility])  Continued psychiatric symptoms causing impairment in IDL  or functioning  Continued non-compliance with meds resulting in decompensation  High level of debilitation and symptoms with inadequate support  Continued need for structured care to adjust medications  Continued presence of debilitating symptoms  Continued presence of risk factors     CONTINUED NEED FOR GROUP PSYCHOTHERAPY TO ADDRESS THE FOLLOWING: (Positives in [facility])  Psychiatric symptom management       Psychiatric relapse prevention    Anxiety management      Self-esteem issues      Poor decision making       Recent decompensation       Safety issues      Anger management     Self care/ADL's     Med/treatment compliance      Impaired boundaries/relationships     Assertiveness      Grief/loss resolution       Guilt/shame issues     Realistic goal setting      DIAGNOSTIC IMPRESSION:   Possible Bipolar disorder type II, depressed--improving (F31.81). Alcohol Use disorder with persisting dementia - in remission (F10.27). Likely Learning disabilities (F81.9).      PLAN:   Continue the Abilify at 2 mg daily--has 2 months of refills (90 day)  Continue the increased Lexapro at 20 mg

## 2023-11-12 ASSESSMENT — ENCOUNTER SYMPTOMS
SHORTNESS OF BREATH: 0
CONSTIPATION: 0
NAUSEA: 0
ABDOMINAL PAIN: 0
DIARRHEA: 0
BACK PAIN: 1
WHEEZING: 0
CHEST TIGHTNESS: 0
COUGH: 0

## 2023-11-14 ENCOUNTER — APPOINTMENT (OUTPATIENT)
Facility: HOSPITAL | Age: 63
End: 2023-11-14
Payer: COMMERCIAL

## 2023-11-14 PROCEDURE — 90853 GROUP PSYCHOTHERAPY: CPT

## 2023-11-16 ENCOUNTER — APPOINTMENT (OUTPATIENT)
Facility: HOSPITAL | Age: 63
End: 2023-11-16
Payer: COMMERCIAL

## 2023-11-16 PROCEDURE — 90853 GROUP PSYCHOTHERAPY: CPT

## 2023-11-17 ENCOUNTER — APPOINTMENT (OUTPATIENT)
Facility: HOSPITAL | Age: 63
End: 2023-11-17
Payer: COMMERCIAL

## 2023-11-17 PROCEDURE — 90853 GROUP PSYCHOTHERAPY: CPT

## 2023-11-21 ENCOUNTER — HOSPITAL ENCOUNTER (OUTPATIENT)
Facility: HOSPITAL | Age: 63
Setting detail: RECURRING SERIES
Discharge: HOME | End: 2023-11-24
Payer: COMMERCIAL

## 2023-11-21 PROCEDURE — 90853 GROUP PSYCHOTHERAPY: CPT

## 2023-11-22 ENCOUNTER — HOSPITAL ENCOUNTER (OUTPATIENT)
Facility: HOSPITAL | Age: 63
Setting detail: RECURRING SERIES
Discharge: HOME | End: 2023-11-25
Payer: COMMERCIAL

## 2023-11-22 PROCEDURE — 90853 GROUP PSYCHOTHERAPY: CPT

## 2023-11-24 ENCOUNTER — APPOINTMENT (OUTPATIENT)
Facility: HOSPITAL | Age: 63
End: 2023-11-24
Payer: COMMERCIAL

## 2023-11-28 ENCOUNTER — HOSPITAL ENCOUNTER (OUTPATIENT)
Facility: HOSPITAL | Age: 63
Setting detail: RECURRING SERIES
Discharge: HOME | End: 2023-12-01
Payer: COMMERCIAL

## 2023-11-28 PROCEDURE — 90853 GROUP PSYCHOTHERAPY: CPT
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
## 2023-11-29 ENCOUNTER — HOSPITAL ENCOUNTER (OUTPATIENT)
Facility: HOSPITAL | Age: 63
Setting detail: RECURRING SERIES
Discharge: HOME | End: 2023-12-02
Payer: COMMERCIAL

## 2023-11-29 PROCEDURE — 90853 GROUP PSYCHOTHERAPY: CPT

## 2023-11-29 NOTE — BH NOTE
Group Therapy Note    Date: 11/29/2023    Group Start Time: 11:00 AM  Group End Time: 11:50 AM  Group Topic: Psychotherapy    [facility] BEHAVIORAL [facility] OP    Jake Gray, ZAIDAW        Group Therapy Note    Attendees: 9 scheduled      Focus of session was on our current ways of coping with emotions and problems and is they effective or not. We spoke about how we can tell what we are doing is effective and that is that our problems get resolved and/or our emotions lessen as a result. We spoke about ineffective coping skills such as suppression or avoidance, being passive and not doing anything about it, acting out or striking back, blaming others or circumstances. We spoke about what are effective coping skills and they include confronting the issues and face reality, seeking out help or information, setting our priorities, and establishing goals. We spoke about what are we currently doing that is working and what is not working. Patient's Goal:  1. 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 Pt will identify outlets that she can pursue for relaxation and something productive as her coping skill to manage emotions. Notes:  Alfonso Paulino was brighter in affect today and he was able to engage in group. He spoke about how he likes \"being here and being around people. \"  He spoke about how he does want to work on not \"bottling everything up and then blowing up. \"      Status After Intervention:  Unchanged    Participation Level:  Active Listener and Interactive    Participation Quality: Appropriate, Attentive, Sharing, and Supportive      Speech:  normal      Thought Process/Content: Logical  Linear      Affective Functioning: Congruent      Mood: anxious and depressed      Level of consciousness:  Alert, Oriented x4, and Attentive      Response to Learning: Able to verbalize current knowledge/experience, Able to retain information, and Capable of insight        Modes of

## 2023-12-01 ENCOUNTER — HOSPITAL ENCOUNTER (OUTPATIENT)
Facility: HOSPITAL | Age: 63
Setting detail: RECURRING SERIES
Discharge: HOME | End: 2023-12-04
Payer: COMMERCIAL

## 2023-12-01 PROCEDURE — 90853 GROUP PSYCHOTHERAPY: CPT

## 2023-12-01 NOTE — BH NOTE
[facility] Outpatient Program  Group Therapy  Treatment Plan Review     TREATMENT PLAN REVIEW REVIEW DATE: 11/27/2023     summary of Ongoing issues with Symptoms/Functional Impairments Indicating Need for Continued Stay:  Valerio Rogers had some setbacks this month in regard to managing his emotions. He became very angry after making assumptions and jumping to conclusions about an incident here. He had talked about quitting and then was very remorseful for his thoughts and what he said in response to his anger. He was willing to keep coming and he does recognize he has a lot of work he can do to manage his anger more effectively. We will continue to have pt attend three days a week at this time. TREATMENT & DISCHARGE PLANNING CHANGES    Modality or Intervention  Changes Pt will continue to attend three groups a day, three days a week. Psychotropic Medication Changes  Trazadone increased to 100 mg qhs on 11/10/23. Discharge  Planning or Target Date  Changes 2/28/2024   New Issues Recent anger issues and working to process more effectively. TREATMENT TEAM SIGNATURE / DATE   I have participated in the development of this plan of treatment and agreed to its implementation.    Client Signature PRINTED Name                       Mimi Gonzalez Date & Time       Family / Legal Representative Signature (If Applicable) PRINTED Name & Relationship     Date & Time   Therapist Signature PRINTED Name & Selma Michel [facility] Date & Time       Therapist Signature PRINTED Name & Beatriz Jimenez [facility]   Date & Time       Other Signature PRINTED Name & Credential     Date & Time   Other Signature PRINTED Name & Credential or Relationship     Date & Time       PHYSICIAN CERTIFICATION OF THE LEVEL OF CARE:  I certify that these outpatient behavioral health services are medically necessary to improve and maintain the patient's condition and

## 2023-12-05 ENCOUNTER — HOSPITAL ENCOUNTER (OUTPATIENT)
Facility: HOSPITAL | Age: 63
Setting detail: RECURRING SERIES
Discharge: HOME | End: 2023-12-08
Payer: COMMERCIAL

## 2023-12-05 PROCEDURE — 90853 GROUP PSYCHOTHERAPY: CPT

## 2023-12-05 RX ORDER — CETIRIZINE HYDROCHLORIDE 10 MG/1
10 TABLET ORAL DAILY
Qty: 90 TABLET | Refills: 1 | Status: SHIPPED | OUTPATIENT
Start: 2023-12-05

## 2023-12-05 RX ORDER — ARIPIPRAZOLE 2 MG/1
2 TABLET ORAL DAILY
Qty: 30 TABLET | Refills: 5 | Status: SHIPPED | OUTPATIENT
Start: 2023-12-05

## 2023-12-06 ENCOUNTER — HOSPITAL ENCOUNTER (OUTPATIENT)
Facility: HOSPITAL | Age: 63
Setting detail: RECURRING SERIES
Discharge: HOME | End: 2023-12-09
Payer: COMMERCIAL

## 2023-12-06 PROCEDURE — 90853 GROUP PSYCHOTHERAPY: CPT

## 2023-12-06 NOTE — BH NOTE
Group Therapy Note    Date: 12/6/2023    Group Start Time: 11:00 AM  Group End Time: 11:50 AM  Group Topic: Psychotherapy    [address] [facility] OP    Lui Gray, [address] [facility]        Group Therapy Note    Attendees: 10 scheduled    Focus of session was based on information from Tereza about the types of coping skills that we develop over lifetime. We spoke about the need to have these different types and how they can help now at this phase of life. We spoke about active skills which are solving problems, asking for help, goal planning, and reframing thoughts and beliefs. Accommodative skills which are adjusting expectations and preferences to suit situations and people, emotional which are regulating emotional responses to stress, behavioral which is using healthy behaviors to manage stress, and cognitive which are mental activities that help manage stress. We spoke about categories that may be lacking and what can be tried to build up these skills. Patient's Goal:  1. UM F 31.81  A. Pt. will learn two new ways to manage frustration in a positive manner especially to replace internalizing frustrations    Notes:  Valerio Rogers was very quiet in group but he spoke about how he slept better last night and he got about 7 hours of sleep. He did not want to engage in group but he continues to benefit from the clinical interactions and group support. Status After Intervention:  Unchanged    Participation Level:  Active Listener, Interactive, and Minimal    Participation Quality: Appropriate, Attentive, Sharing, and Supportive      Speech:  normal      Thought Process/Content: Logical      Affective Functioning: Congruent      Mood: anxious      Level of consciousness:  Alert, Oriented x4, and Attentive      Response to Learning: Able to verbalize current knowledge/experience        Modes of Intervention: Education, Support, Socialization, and

## 2023-12-08 ENCOUNTER — HOSPITAL ENCOUNTER (OUTPATIENT)
Facility: HOSPITAL | Age: 63
Setting detail: RECURRING SERIES
Discharge: HOME | End: 2023-12-11
Payer: COMMERCIAL

## 2023-12-08 PROCEDURE — 90853 GROUP PSYCHOTHERAPY: CPT

## 2023-12-11 ENCOUNTER — HOSPITAL ENCOUNTER (OUTPATIENT)
Facility: HOSPITAL | Age: 63
Setting detail: RECURRING SERIES
Discharge: HOME | End: 2023-12-14
Payer: COMMERCIAL

## 2023-12-11 PROCEDURE — 90853 GROUP PSYCHOTHERAPY: CPT

## 2023-12-13 ENCOUNTER — HOSPITAL ENCOUNTER (OUTPATIENT)
Facility: HOSPITAL | Age: 63
Setting detail: RECURRING SERIES
Discharge: HOME | End: 2023-12-16
Payer: COMMERCIAL

## 2023-12-13 PROCEDURE — 90853 GROUP PSYCHOTHERAPY: CPT

## 2023-12-13 PROCEDURE — 99214 OFFICE O/P EST MOD 30 MIN: CPT | Performed by: PSYCHIATRY & NEUROLOGY

## 2023-12-13 NOTE — PROGRESS NOTES
SOP PROGRESS NOTE    INTERVAL HISTORY/FINDINGS:   States he's still been feeling \"pretty good\" affectively, and that his mood seems to be very slowly improving, bit by bit. He's sleeping better with the increased Trazodone, although his back problems still interfere somewhat. His N/V functioning has still been fairly good as well, and he's not had any overt anhedonia or dysphoria, and his appetite is still stronger with the Abilify on board. He's gained 5 more #'s, and now weighs 155# which is his historical baseline weight. He's very comfortable continuing the Lexapro, VPA, Abilify, and Trazodone for now. He denies having any SE's with them at all. No mood instability or hypomanic symptoms at all. Still stuck at home, but coming to [facility] really helps him, emotionally. He denies any SI or full hopelessness. Had to order Abilify 30 days at a time through the mail-order service. MEDICATIONS:  Current Outpatient Medications   Medication Sig    cetirizine (ZYRTEC) 10 MG tablet Take 1 tablet by mouth daily    ARIPiprazole (ABILIFY) 2 MG tablet Take 1 tablet by mouth daily    traZODone (DESYREL) 100 MG tablet Take 1 tablet by mouth nightly    naproxen (NAPROSYN) 500 MG tablet Take 1 tablet by mouth 2 times daily (with meals)    escitalopram (LEXAPRO) 20 MG tablet Take 1 tablet by mouth daily    divalproex (DEPAKOTE) 250 MG DR tablet Take 1 tablet by mouth 2 times daily    acetaminophen (TYLENOL) 500 MG tablet Take 2 tablets by mouth 3 times daily    acetaminophen (TYLENOL) 325 MG tablet Take 2 tablets by mouth every 6 hours as needed for Pain    lisinopril (PRINIVIL;ZESTRIL) 20 MG tablet Take 1 tablet by mouth daily    amLODIPine (NORVASC) 10 MG tablet Take 1 tablet by mouth daily    sucralfate (CARAFATE) 1 GM tablet     omeprazole (PRILOSEC) 40 MG delayed release capsule Take 1 capsule by mouth 2 times daily     No current facility-administered medications for this encounter.              MENTAL STATUS UPDATE:

## 2023-12-15 ENCOUNTER — HOSPITAL ENCOUNTER (OUTPATIENT)
Facility: HOSPITAL | Age: 63
Setting detail: RECURRING SERIES
End: 2023-12-15
Payer: COMMERCIAL

## 2023-12-15 PROCEDURE — 90853 GROUP PSYCHOTHERAPY: CPT

## 2023-12-18 ENCOUNTER — APPOINTMENT (OUTPATIENT)
Facility: HOSPITAL | Age: 63
End: 2023-12-18
Payer: COMMERCIAL

## 2023-12-18 NOTE — BH NOTE
Group Therapy Note    Date: 12/18/2023    Group Start Time: 11:00 AM  Group End Time: 11:50 AM  Group Topic: Psychotherapy    [address]    [facility]        Group Therapy Note    Attendees: 10 scheduled    Focus of session was from information and skills for DBT focused on how to manage our minds and focusing specifically on being non-judgmental.   We spoke about the impact that having judgmental thoughts has on our bodies and the benefits of being more aware of when we are judging ourselves and others. We spoke about ideas from [employer] of how to practice nonjudgementalness and how observation is a aldridge. We spoke about how we can then practice replacing judgmental thoughts with facts as well as being more aware of our body language and our own internal tension. We spoke about how members can use these skills and what they hope to gain from these skills. Patient's Goal:  1. UM F 31.81  L Pt will verbalize the impact that he notices on his mood and her functioning when he stops  medications or misses doses and how he can challenge thoughts to miss or not prioritize taking medications consistently    Notes:  Paul Moreno participated in group well with prompting. He spoke about how he knows that he benefits from coming to group to get some help and support. He spoke about how his back and hip pain can be very distracting and create a lot of unhappiness for himself. Status After Intervention:  Unchanged    Participation Level:  Active Listener and Interactive    Participation Quality: Appropriate, Attentive, Sharing, and Supportive      Speech:  normal      Thought Process/Content: Logical  Linear      Affective Functioning: Congruent and Flat      Mood: anxious and depressed      Level of consciousness:  Alert, Oriented x4, Attentive, and Preoccupied      Response to Learning: Able to verbalize current

## 2023-12-18 NOTE — BH NOTE
Group Therapy Note    Date: 12/18/2023    Group Start Time: 10:00 AM  Group End Time: 10:50 AM  Group Topic: Psychotherapy    [facility] BEHAVIORAL TH OP    Wade Gray, ZAIDAW        Group Therapy Note    Attendees: 10 scheduled    Focus of session was a review of the weekend and helping group members see their wins that they can celebrate from the past several days. We spoke about the things that occurred and choices that they had in front of them and helping everyone to see the positive impact that they had on their well-being. We also spoke about what may have been the setbacks and how they coped and what they can plan to do for the future to help build on relapse prevention. Behavioral Health Daily Check In form revealed that patient is not experiencing SI/HI thoughts or plans, remaining abstinent from drugs and alcohol, and report's goal today of  \"to manage the pain in my back and butt. \"      Patient's Goal:  1  UM F 31.81 I. Pt will share times in which he has overcome difficult emotional moments and the skills he can see that he used    Notes:  Roderick Back spoke about how he had a \"terrible weekend\" and that he was in pain and he cannot seem to get refills on his medicines from his NP who is his PCP. He spoke about how he is trying to work through the issues but he has gotten angry. He spoke about how \"I cuss and swore\" but he did not break anything. We spoke about how this is positive progress which he was able to agree with. Status After Intervention:  Unchanged    Participation Level:  Active Listener and Interactive    Participation Quality: Appropriate, Attentive, Sharing, and Supportive      Speech:  normal and hesitant      Thought Process/Content: Logical  Perseverating      Affective Functioning: Congruent      Mood: anxious, depressed, and irritable      Level of consciousness:  Alert, Oriented x4, Attentive, and

## 2023-12-20 ENCOUNTER — APPOINTMENT (OUTPATIENT)
Facility: HOSPITAL | Age: 63
End: 2023-12-20
Payer: COMMERCIAL

## 2023-12-22 ENCOUNTER — APPOINTMENT (OUTPATIENT)
Facility: HOSPITAL | Age: 63
End: 2023-12-22
Payer: COMMERCIAL

## 2023-12-22 PROCEDURE — 90853 GROUP PSYCHOTHERAPY: CPT

## 2023-12-27 ENCOUNTER — HOSPITAL ENCOUNTER (OUTPATIENT)
Facility: HOSPITAL | Age: 63
Setting detail: RECURRING SERIES
Discharge: HOME | End: 2023-12-30
Payer: COMMERCIAL

## 2023-12-27 PROCEDURE — 90853 GROUP PSYCHOTHERAPY: CPT

## 2023-12-27 NOTE — BH NOTE
Group Therapy Note    Date: 12/27/2023    Group Start Time: 11:00 AM  Group End Time: 11:50 AM  Group Topic: Psychotherapy    [facility] BEHAVIORAL [facility] OP    Francisca Gray, ZAIDAW        Group Therapy Note    Attendees:  11 scheduled    Focus of session was on ways to challenge negative thinking and to run our negative thoughts through the filter of the following three questions:  Is my thinking based on fact, is my thinking leading me to feel what I want to feel, and is my thinking leading me towards my goals? We spoke about how if we answer no to any of these questions, we need to stop and take the time to challenge that thought or thoughts. We spoke about what recent negative thoughts we can recognize and we tested our thought out to see if it was a thought we should pay attention to. We addressed how to challenge and/or distract ourselves to move away from unnecessary and unhelpful thoughts. Patient's Goal:  1. UM F 31.81  L. Pt will verbalize the impact that he notices on his mood and her functioning when he stops  medications or misses doses and how he can challenge thoughts to miss or not prioritize taking medications consistently    Notes:  Dariusz Lawler participated in group with prompting. He was able to gain information and knowledge from group about critical self talk that he did not understand before. He spoke about how he knows that he can very \"mean to myself. \"      Status After Intervention:  Unchanged    Participation Level:  Active Listener and Interactive    Participation Quality: Appropriate, Attentive, Sharing, and Supportive      Speech:  normal      Thought Process/Content: Logical  Linear      Affective Functioning: Congruent      Mood: anxious and depressed      Level of consciousness:  Alert, Oriented x4, and Attentive      Response to Learning: Able to verbalize current knowledge/experience, Capable of insight, and

## 2023-12-28 ENCOUNTER — TELEPHONE (OUTPATIENT)
Facility: CLINIC | Age: 63
End: 2023-12-28

## 2023-12-28 NOTE — TELEPHONE ENCOUNTER
----- Message from Alondra Jerry sent at 12/28/2023 10:17 AM EST -----  Subject: Refill Request    QUESTIONS  Name of Medication? lidocaine 4 % external patch  Patient-reported dosage and instructions? AS NEEDED 3 TIMES DAY   How many days do you have left? 0  Preferred Pharmacy? [id number]  Pharmacy phone number (if available)? [phone]  Additional Information for Provider? NEEDS TO BE %5 NOT 4%  ---------------------------------------------------------------------------  --------------  CALL BACK INFO  What is the best way for the office to contact you? OK to leave message on   voicemail  Preferred Call Back Phone Number? [phone]  ---------------------------------------------------------------------------  --------------  SCRIPT ANSWERS  Relationship to Patient? Self

## 2023-12-29 ENCOUNTER — HOSPITAL ENCOUNTER (OUTPATIENT)
Facility: HOSPITAL | Age: 63
Setting detail: RECURRING SERIES
Discharge: HOME | End: 2024-01-01
Payer: COMMERCIAL

## 2023-12-29 PROCEDURE — 90853 GROUP PSYCHOTHERAPY: CPT

## 2023-12-29 NOTE — BH NOTE
Group Therapy Note    Date: 12/29/2023    Group Start Time: 10:00 AM  Group End Time: 10:50 AM  Group Topic: Psychotherapy    [facility] BEHAVIORAL TH OP    Larissa Gray, LCSW        Group Therapy Note    Attendees: 11 scheduled    Focus of session was based on information about bad habits that we hold that show a lack of self-esteem. We reviewed what self-esteem is and how it can be built and where pt believes their current level is. We discussed the habits which include saying yes to everything, negative self-talk, backing down when opinions are challenged or not being willing to listen to others, being indecisive, fearing failure, taking constructive criticism personally, afraid to share opinions, and claiming successes are luck or good fortune. We spoke about where patient can begin to challenge these habits and what the focus can be to build self-esteem.   Behavioral Health Daily Check In form revealed that patient is not experiencing SI/HI thoughts or plans, remaining abstinent from drugs and alcohol, and report's goal today of   \"to get help.\"      Patient's Goal:  1. UM F 31.81  E.  “Pt will use stop, look, listen, think, and plan before acting once a day and identify healthy responses    Notes:  Nato participated in group well with prompting.  He was quiet but he benefited from talking to peers and being able to be among others. He spoke about the loneliness that continues to be very active in his daily life.      Status After Intervention:  Improved    Participation Level: Active Listener and Interactive    Participation Quality: Appropriate, Attentive, Sharing, and Supportive      Speech:  normal      Thought Process/Content: Logical  Linear      Affective Functioning: Congruent and Flat      Mood: anxious      Level of consciousness:  Alert, Oriented x4, and Attentive      Response to Learning: Able to verbalize current

## 2023-12-29 NOTE — BH NOTE
Group Therapy Note    Date: 12/29/2023    Group Start Time: 11:00 AM  Group End Time: 11:50 AM  Group Topic: Psychotherapy    [facility] BEHAVIORAL [facility] OP    Larissa Gray LCSW        Group Therapy Note    Attendees:  11 scheduled    Focus of session was based on handout titled \"From Living in Fear to Living in Forest.\" We spoke about the impact of fear and how we can stay stuck in fear as well as what it would take to be more daring to make changes we want for ourselves. Patient discussed what their focus can be to help challenge thoughts and habits that are keeping the fear in place.        Patient's Goal: 1. UM F 31.81  K.  Pt will identify what his tentative plans for discharge from program look like and what kind of services he can see will be beneficial to him.”     Notes:  Nato participated in group minimally but he was able to follow topic and shared some general thoughts. He continues to benefit from the group interaction to help him maintain his mood and his functioning.     Status After Intervention:  Unchanged    Participation Level: Active Listener and Minimal    Participation Quality: Appropriate, Attentive, and Supportive      Speech:  normal      Thought Process/Content: Logical      Affective Functioning: Congruent and Flat      Mood: anxious and depressed      Level of consciousness:  Alert, Oriented x4, Attentive, and Preoccupied      Response to Learning: Able to verbalize current knowledge/experience, Capable of insight, and Able to change behavior        Modes of Intervention: Education, Support, Socialization, Exploration, and Clarifying      Discipline Responsible: /Counselor      Signature:  Larissa Gray LCSW

## 2024-01-02 ENCOUNTER — HOSPITAL ENCOUNTER (OUTPATIENT)
Facility: HOSPITAL | Age: 64
Setting detail: RECURRING SERIES
Discharge: HOME | End: 2024-01-05
Payer: COMMERCIAL

## 2024-01-02 PROCEDURE — 90853 GROUP PSYCHOTHERAPY: CPT

## 2024-01-02 NOTE — BH NOTE
Group Therapy Note    Date: 1/2/2024    Group Start Time: 11:00 AM  Group End Time: 11:50 AM  Group Topic: Psychotherapy    [facility] BEHAVIORAL TH OP    Shameka Murphy LCSW        Group Therapy Note    Attendees: 9 scheduled  Focus of session was based on the 4 agreements by Iain Milton.  The 4 agreements are: be impeccable with your word, don't take anything personally, don't make assumptions, and always do your best.  We spoke about these ideas and how they can impact our mental health recovery in a positive way and help us take steps forward.  I asked group members to share which one they will focus on and how they think it can and will impact their daily life.         Patient's Goal:  1UM F31.81 H “Pt will identify choices made over past several days and how they impacted him emotionally and cognitively.”     Notes:  Pt listened to the group activity and the discussion of others.  He was attentive and cooperative but did not verbally interact stating at one point he didn't have anything to share.  His mood was bright and friendly    Status After Intervention:  Unchanged    Participation Level: Active Listener and Minimal    Participation Quality: Appropriate and Attentive      Speech:  normal      Thought Process/Content: Logical      Affective Functioning: Congruent      Mood: euthymic      Level of consciousness:  Alert and Attentive      Response to Learning: Able to verbalize current knowledge/experience and Able to retain information      Modes of Intervention: Education, Support, and Clarifying      Discipline Responsible: /Counselor      Signature:  Shameka Murphy LCSW    
Capable of insight      Modes of Intervention: Education, Support, Socialization, Exploration, and Clarifying      Discipline Responsible: /Counselor      Signature:  Larissa Gray LCSW

## 2024-01-02 NOTE — BH NOTE
[facility] Outpatient Program  Group Therapy  Treatment Plan Review  TREATMENT PLAN REVIEW REVIEW DATE: 12/28/2023     summary of Ongoing issues with Symptoms/Functional Impairments Indicating Need for Continued Stay:  Nato has had a better month overall but he remains depressed and isolated as he does not have a car nor any friends or family in the area.  He has been able to check his thinking and his focus and he has benefitted from being helped with seeing how his thinking can contribute to his mood and to feelings of anger.  He will continue to attend three days a week, three groups a day to help him maintain his psychosocial stressors which continue to make him vulnerable.       TREATMENT & DISCHARGE PLANNING CHANGES    Modality or Intervention  Changes Pt will continue to attend three groups a day, three days a week.     Psychotropic Medication Changes  No changes since last review.     Discharge  Planning or Target Date  Changes No changes at this time.     New Issues No new issues, managing holiday stress and loneliness.       TREATMENT TEAM SIGNATURE / DATE   I have participated in the development of this plan of treatment and agreed to its implementation.   Client Signature PRINTED Name                       Nato Small Date & Time       Family / Legal Representative Signature (If Applicable) PRINTED Name & Relationship     Date & Time   Therapist Signature PRINTED Name & Credential                            [facility]       Therapist Signature PRINTED Name & Credential                         [facility]   Date & Time       Other Signature PRINTED Name & Credential     Date & Time   Other Signature PRINTED Name & Credential or Relationship     Date & Time       PHYSICIAN CERTIFICATION OF THE LEVEL OF CARE:  I certify that these outpatient behavioral health services are medically necessary to improve and maintain the patient's condition and functional level and

## 2024-01-03 NOTE — TELEPHONE ENCOUNTER
----- Message from Val Guevara sent at 1/3/2024 10:28 AM EST -----  Subject: Refill Request    QUESTIONS  Name of Medication? lidocaine 4 % external patch  Patient-reported dosage and instructions? 5 % tid  How many days do you have left? 0  Preferred Pharmacy? [facility]  Pharmacy phone number (if available)? [phone]  Additional Information for Provider? pt needs the 5%. It was ordered wrong   prior.   ---------------------------------------------------------------------------  --------------  CALL BACK INFO  What is the best way for the office to contact you? OK to leave message on   voicemail  Preferred Call Back Phone Number? [phone]  ---------------------------------------------------------------------------  --------------  SCRIPT ANSWERS  Relationship to Patient? Self

## 2024-01-03 NOTE — TELEPHONE ENCOUNTER
----- Message from Vaibhav Murphy sent at 12/7/2023 11:10 AM EST -----  Subject: Medication Problem    Medication: amLODIPine (NORVASC) 10 MG tablet  Dosage: 10 mg tablet  Ordering Provider: Aimee mckenzie    Question/Problem: The pt stated he was told that his pharmacy instructed   him that he had no more refills left on his blood pressure medications and   will be needing a new prescription sent to his pharmacy for the amLODIPine   (NORVASC) 10 MG tablet, lisinopril (PRINIVIL;ZESTRIL) 20 MG. Please   advise.      Pharmacy: [facility] - [place] - [address] - P [phone] - F [phone]    ---------------------------------------------------------------------------  --------------  CALL BACK INFO  [phone]; OK to leave message on voicemail  ---------------------------------------------------------------------------  --------------    SCRIPT ANSWERS  Relationship to Patient: Self

## 2024-01-03 NOTE — TELEPHONE ENCOUNTER
----- Message from Vaibhav Murphy sent at 12/7/2023 11:10 AM EST -----  Subject: Medication Problem    Medication: amLODIPine (NORVASC) 10 MG tablet  Dosage: 10 mg tablet  Ordering Provider: Aimee mckenzie    Question/Problem: The pt stated he was told that his pharmacy instructed   him that he had no more refills left on his blood pressure medications and   will be needing a new prescription sent to his pharmacy for the amLODIPine   (NORVASC) 10 MG tablet, lisinopril (PRINIVIL;ZESTRIL) 20 MG. Please   advise.      Pharmacy: [facility] - [place] - [address] - P [phone] - F [phone]    ---------------------------------------------------------------------------  --------------  CALL BACK INFO  [phone]; OK to leave message on voicemail  ---------------------------------------------------------------------------  --------------    SCRIPT ANSWERS  Relationship to Patient: Self

## 2024-01-03 NOTE — TELEPHONE ENCOUNTER
----- Message from Virginia Grossman sent at 12/29/2023  8:16 AM EST -----  Subject: Medication Problem     Medication: lidocaine (LIDODERM) 5 %  Dosage: Apply patch to the affected area for 12 hours a day and remove for   12 hours a day.  Ordering Provider:     Question/Problem: Nato is trying to get these refilled. PLease call him   to discuss what the issue is.       Pharmacy: [facility] [address] [place] [address]   WAY - P [phone] - F [phone]    ---------------------------------------------------------------------------  --------------  CALL BACK INFO  [phone]; OK to leave message on voicemail  ---------------------------------------------------------------------------  --------------    SCRIPT ANSWERS  Relationship to Patient: Self

## 2024-01-03 NOTE — TELEPHONE ENCOUNTER
Sent request for Lisinipril 20mg refill to CORRINE Jolley NP - Amlodipine has already been done  Penny Bedolla LPN 1/3/2024 9:20 AM

## 2024-01-04 ENCOUNTER — HOSPITAL ENCOUNTER (OUTPATIENT)
Facility: HOSPITAL | Age: 64
Setting detail: RECURRING SERIES
Discharge: HOME | End: 2024-01-07
Payer: COMMERCIAL

## 2024-01-04 PROCEDURE — 90853 GROUP PSYCHOTHERAPY: CPT

## 2024-01-04 NOTE — BH NOTE
Group Therapy Note    Date: 1/4/2024    Group Start Time: 11:00 AM  Group End Time: 11:50 AM  Group Topic: Psychotherapy    [facility] BEHAVIORAL TH OP    Shameka Murphy, BRETT        Group Therapy Note    Attendees: 11 scheduled    Focus of session was on procrastination and the possible reasons behind why we do it.  We spoke about fear and lack of confidence and esteem can play a major role in our reasons for doing this and we spoke about how we can also let being stubborn get in our way and that affects our ability to move forward.  We spoke about how we can be contributing the reason to not do these things by how we talk to ourselves and that we tell ourselves that we are not strong enough and we are not capable.  We addressed effective ways to avoid procrastination and that we need to refute our arguments aggressively as to why we are putting things off.  We spoke about addressing specifically our reason for delay and developing fair arguments against delay and work to move forward       Patient's Goal:  1UM F31.81 E .  “Pt will use stop, look, listen, think, and plan before acting once a day and identify healthy responses.”    Notes:  Pt shared that he is not fearful anymore about not being able to pay his bills.  He stopped drinking and made the effort to go to all of the doctors necessary to qualify for disability.  He is grateful to his sister for helping him and that she doesn't have to pay his bills anymore.  We discussed his past choices and progress and how it has helped him to feel more independent and confident.     Status After Intervention:  Improved    Participation Level: Active Listener and Interactive    Participation Quality: Appropriate, Attentive, and Sharing      Speech:  normal      Thought Process/Content: Logical      Affective Functioning: Congruent      Mood: bright      Level of consciousness:  Alert and 
Intervention: Support and Clarifying      Discipline Responsible: /Counselor      Signature:  Shameka Murphy LCSW

## 2024-01-05 ENCOUNTER — HOSPITAL ENCOUNTER (OUTPATIENT)
Facility: HOSPITAL | Age: 64
Setting detail: RECURRING SERIES
Discharge: HOME | End: 2024-01-08
Payer: COMMERCIAL

## 2024-01-05 ENCOUNTER — TELEPHONE (OUTPATIENT)
Facility: CLINIC | Age: 64
End: 2024-01-05

## 2024-01-05 PROCEDURE — 90853 GROUP PSYCHOTHERAPY: CPT

## 2024-01-05 RX ORDER — LISINOPRIL 20 MG/1
20 TABLET ORAL DAILY
Qty: 90 TABLET | Refills: 1 | Status: SHIPPED | OUTPATIENT
Start: 2024-01-05

## 2024-01-05 RX ORDER — LIDOCAINE 50 MG/G
1 PATCH TOPICAL DAILY
Qty: 30 PATCH | Refills: 0 | Status: SHIPPED | OUTPATIENT
Start: 2024-01-05 | End: 2024-02-04

## 2024-01-05 NOTE — BH NOTE
Group Therapy Note    Date: 1/5/2024    Group Start Time: 11:00 AM  Group End Time: 11:50 AM  Group Topic: Psychotherapy    [facility] BEHAVIORAL TH OP    Shameka Murphy LCSW        Group Therapy Note    Attendees: 10 scheduled    Focus of session was a discussion of the quote by Delfina Medina which is “You can choose courage or you can choose comfort but you cannot choose both.”  We spoke about the value in this quote and the comfort can be something hard to recognize because it can be a rut or rock bottom and it is an unhealthy place for us.  We spoke about the courage that is need to overcome fears and worries to take on change and how we can overcome those fears.  We spoke about how we always have the option to take steps up rather than continue down into that pit.  Group members were asked to share how they can overcome fear and break out of that comfort zone.        Patient's Goal:  1UM F31.81 M “Pt will identify what he can do to build up his self esteem, supportive relationships, and his personal empowerment to help increase confidence for discharge.”      Notes:  Pt followed along with the activity and read aloud from the worksheet. He discussed that he is experiencing high electricity bills and that is concerning for him.  He is using heating units as he does not have heat in the house he is staying in.  Group members discussed possible coping strategies and assistance he might be able to access.      Status After Intervention:  Improved    Participation Level: Active Listener and Interactive    Participation Quality: Appropriate, Attentive, Sharing, and Supportive      Speech:  normal      Thought Process/Content: Logical      Affective Functioning: Congruent      Mood: euthymic      Level of consciousness:  Alert, Oriented x4, and Attentive      Response to Learning: Able to verbalize current knowledge/experience, Able to

## 2024-01-05 NOTE — BH NOTE
Group Therapy Note    Date: 1/5/2024    Group Start Time: 10:00 AM  Group End Time: 10:30 AM  Group Topic: Psychotherapy    [facility] BEHAVIORAL TH OP    Shameka Murphy, BRETT        Group Therapy Note    Attendees: 10 scheduled    Focus of group session was a discussion on anxiety and what triggers anxiety and what anxiety feels like for each group member.  We spoke about grounding techniques and the importance of returning to the present moment when anxiety is high and thoughts are starting to race and become irrational and fearful.  We spoke about the techniques shared in an article from \"[employer]\" and the techniques include square breathing, developing mantras, touching and describing objects around us, and using declarative memory (name all the dog breeds you know, colors.)  We spoke about what techniques can be helpful for each group member.    Behavioral Health Daily Check In form revealed that pt is not experiencing SI/HI thoughts or plans, remaining abstinent from drugs and alcohol, and reports goal today of  trying to get my life straight       Patient's Goal:  1UM F31.81 I .  “Pt will share times in which he has overcome difficult emotional moments and the skills he can see that he used.”     Notes:  Pt listened to the group discussion and participated when prompted.  Pt shared his strategies that were beneficial to him in gaining independence and taking responsibility for his own bills .  He was verbally expressing support for another member that is currently trying to do the same things.  The other member was receptive to his ideas. Pt was also able to work toward his goals of communicating  and sharing with others.     Status After Intervention:  Improved    Participation Level: Active Listener and Interactive    Participation Quality: Appropriate, Attentive, Sharing, and Supportive      Speech:  normal      Thought

## 2024-01-05 NOTE — TELEPHONE ENCOUNTER
----- Message from Annita Li sent at 1/5/2024  9:06 AM EST -----  Subject: Refill Request    QUESTIONS  Name of Medication? lidocaine 4 % external patch  Patient-reported dosage and instructions? 5%  How many days do you have left? 0  Preferred Pharmacy? [id number]  Pharmacy phone number (if available)? [phone]  Additional Information for Provider? Patient has been trying to get these   refilled for 1 month. Nobody calls him back and they are not called in.   Patient states he is supposed to get the 5% not the 4%. Patient would like   a call back asap.   ---------------------------------------------------------------------------  --------------  CALL BACK INFO  What is the best way for the office to contact you? OK to leave message on   voicemail  Preferred Call Back Phone Number? [phone]  ---------------------------------------------------------------------------  --------------  SCRIPT ANSWERS  Relationship to Patient? Self

## 2024-01-05 NOTE — TELEPHONE ENCOUNTER
----- Message from Annita Li sent at 1/5/2024  9:06 AM EST -----  Subject: Refill Request    QUESTIONS  Name of Medication? lidocaine 4 % external patch  Patient-reported dosage and instructions? 5%  How many days do you have left? 0  Preferred Pharmacy? [id number]  Pharmacy phone number (if available)? [phone]  Additional Information for Provider? Patient has been trying to get these   refilled for 1 month. Nobody calls him back and they are not called in.   Patient states he is supposed to get the 5% not the 4%. Patient would like   a call back asap.   ---------------------------------------------------------------------------  --------------  CALL BACK INFO  What is the best way for the office to contact you? OK to leave message on   voicemail  Preferred Call Back Phone Number? [phone]  ---------------------------------------------------------------------------  --------------  SCRIPT ANSWERS  Relationship to Patient? Self

## 2024-01-09 ENCOUNTER — HOSPITAL ENCOUNTER (OUTPATIENT)
Facility: HOSPITAL | Age: 64
Setting detail: RECURRING SERIES
Discharge: HOME | End: 2024-01-12
Payer: COMMERCIAL

## 2024-01-09 PROCEDURE — 90853 GROUP PSYCHOTHERAPY: CPT

## 2024-01-09 NOTE — BH NOTE
Group Therapy Note    Date: 1/9/2024    Group Start Time: 10:00 AM  Group End Time: 10:50 AM  Group Topic: Psychotherapy    [facility] BEHAVIORAL TH OP    Marina, Larissa PASCUAL, LCSW        Group Therapy Note    Attendees:  10 scheduled  Focus of session was based on handout from [employer] on “Compassionate Thought Challenge Record.”  We spoke about how the handout can help patient to work towards replacing our automatic thoughts that lead to a more painful and negative feeling state and work towards a compassionate response.  We spoke about the result that can happen if we focus on compassionate responses to what our mind and body is leading us to.     Behavioral Health Daily Check In form revealed that patient is not experiencing SI/HI thoughts or plans, remaining abstinent from drugs and alcohol, and report's goal today of   \"I am depressed today.\"      Patient's Goal:  1. UM F 31.81  K.   “Pt will identify what his tentative plans for discharge from program look like and what kind of services he can see will be beneficial to him.”     Notes:  Nato participated in group with prompting.  He spoke about how he is struggling a great deal with pain and it is causing him to be more depressed. We spoke about his options for getting a new PCP and he continues to feel a lot of frustration about his current PCP and that phone calls are not returned.  He was able to get support and help from other group members.      Status After Intervention:  Improved    Participation Level: Active Listener and Interactive    Participation Quality: Appropriate, Attentive, Sharing, and Supportive      Speech:  normal      Thought Process/Content: Logical  Linear      Affective Functioning: Congruent      Mood: anxious and depressed      Level of consciousness:  Alert, Oriented x4, and Attentive      Response to Learning: Able to verbalize current knowledge/experience,

## 2024-01-09 NOTE — BH NOTE
Group Therapy Note    Date: 1/9/2024    Group Start Time: 11:00 AM  Group End Time: 11:50 AM  Group Topic: Psychotherapy    [facility] BEHAVIORAL TH OP    Shameka Murphy LCSW        Group Therapy Note    Attendees: 10 Scheduled    Focus of session was on an article called “Fear is Your Only Competitor.”  The article spoke about valuable steps to overcome fear and how fear can build based on our choices. I shared how “fear feeds on inaction” and how the solution to that is to do something, anything to get started.  We spoke about what is currently fueling our stress and worry and what can be done to start to tackle fears rather than let them build up. I also shared how “fear feeds in indecision” and how making a decision can help us break down fears and reduce our stress level.         Patient's Goal:  1Um F31.81 J “Pt will verbally express understanding of the relationship between depressed mood & repression of feelings such as anger, hurt, and sadness.”     Notes:  Pt participated in the group activity and discussion.  He shared that he has been able to get his prescriptions worked out and now will be receiving them all by   mail.  I was able to refer him to another physicians practice in response to his request.  He stated he will call and attempt to make an appointment.  Pt is working toward his goals of self confidence and independence.    Status After Intervention:  Improved    Participation Level: Active Listener and Interactive    Participation Quality: Appropriate, Attentive, Sharing, and Supportive      Speech:  normal      Thought Process/Content: Logical      Affective Functioning: Congruent      Mood: brighter      Level of consciousness:  Alert and Attentive      Response to Learning: Able to verbalize current knowledge/experience, Able to verbalize/acknowledge new learning, Able to retain information, Capable of insight, Able to

## 2024-01-10 ENCOUNTER — HOSPITAL ENCOUNTER (OUTPATIENT)
Facility: HOSPITAL | Age: 64
Setting detail: RECURRING SERIES
Discharge: HOME | End: 2024-01-13
Payer: COMMERCIAL

## 2024-01-10 ENCOUNTER — TELEPHONE (OUTPATIENT)
Facility: CLINIC | Age: 64
End: 2024-01-10

## 2024-01-10 PROCEDURE — 90853 GROUP PSYCHOTHERAPY: CPT

## 2024-01-10 NOTE — TELEPHONE ENCOUNTER
----- Message from Mary Shaikh sent at 1/9/2024  4:34 PM EST -----  Subject: Message to Provider    QUESTIONS  Information for Provider? Pt stated he is unhappy with the care he has   received from Aimee Jolley and he is switching to a new provider  ---------------------------------------------------------------------------  --------------  CALL BACK INFO  [phone]; OK to leave message on voicemail  ---------------------------------------------------------------------------  --------------  SCRIPT ANSWERS  Relationship to Patient? Self

## 2024-01-10 NOTE — BH NOTE
Group Therapy Note    Date: 1/10/2024    Group Start Time: 10:00 AM  Group End Time: 10:50 AM  Group Topic: Psychotherapy    [facility] BEHAVIORAL TH OP    Shameka Murphy LCSW        Group Therapy Note    Attendees: 10 scheduled    Focus of session was on an article called “Fear is Your Only Competitor.”  The article spoke about valuable steps to overcome fear and how fear can build based on our choices. I shared how “fear feeds on inaction” and how the solution to that is to do something, anything to get started.  We spoke about what is currently fueling our stress and worry and what can be done to start to tackle fears rather than let them build up. I also shared how “fear feeds in indecision” and how making a decision can help us break down fears and reduce our stress level.       Behavioral Health Daily Check In form revealed that pt is not experiencing SI/HI thoughts or plans, remaining abstinent from drugs and alcohol, and reports goal today, I am worried   Patient's Goal:  1Um F31.81 G .  “Pt will identify some outlets he can pursue at home to provide some distraction or pleasure to help him manage his thoughts, moods, and feelings.”       Notes:  Pt listened to the group discussion and the concerns of other members.  He shared that he has been very concerned about his doctor and prescription situation.  He is very glad today as he was able to get an appointment with another doctor can now receive his medications through the mail.  He stated he feels a lot better and less anxious at this time. He feels more confident in asking for help.    Status After Intervention:  Improved    Participation Level: Active Listener and Interactive    Participation Quality: Appropriate, Attentive, and Sharing      Speech:  normal      Thought Process/Content: Logical      Affective Functioning: Congruent      Mood: brighter      Level of consciousness:  
                                                                      Group Therapy Note    Date: 1/10/2024    Group Start Time: 12:00 PM  Group End Time: 12:50 PM  Group Topic: Psychotherapy    [facility]    Shameka Murphy LCSW        Group Therapy Note    Attendees: 9 scheduled    Focus of session was based on the quote of “Respect yourself enough to walk away from anything that no longer serves you, grows you, or makes you happy as well as the quote of “You will find it necessary to let things go; simply for the reason that they are heavy.”  Group members were asked to share what they need to, or who they need to walk away from for their own recovery.  We spoke about the struggles of letting go and how the thoughts or emotions tend to come back.  I spoke with group members that “letting go” does not mean we won't have to work on letting it go over and over and that it is okay to pop back in our minds and hearts.  I asked group members what or who they need to walk away from starting today.         Patient's Goal:  1Um F31.81 H “Pt will identify choices made over past several days and how they impacted him emotionally and cognitively.”     Notes:  Pt observed the activity and discussion. He was cooperative and alert but did not engage.  Pt left the room a couple of times as his back was hurting but returned to the group.     Status After Intervention:  Unchanged    Participation Level: Active Listener, Interactive, and Minimal    Participation Quality: Attentive      Speech:  normal      Thought Process/Content: Logical      Affective Functioning: Congruent      Mood: euthymic      Level of consciousness:  Attentive and Preoccupied      Response to Learning: Able to verbalize current knowledge/experience      Modes of Intervention: Education and Support      Discipline Responsible: /Counselor      Signature:  Shameka Murphy LCSW    
Process/Content: Logical      Affective Functioning: Congruent      Mood: anxious      Level of consciousness:  Alert, Oriented x4, and Attentive      Response to Learning: Able to verbalize current knowledge/experience, Capable of insight, Able to change behavior, and Progressing to goal      Modes of Intervention: Education, Support, Socialization, Exploration, Clarifying, and Problem-solving      Discipline Responsible: /Counselor      Signature:  Larissa Gray LCSW

## 2024-01-11 RX ORDER — ESCITALOPRAM OXALATE 20 MG/1
20 TABLET ORAL DAILY
Qty: 30 TABLET | Refills: 11 | Status: SHIPPED | OUTPATIENT
Start: 2024-01-11

## 2024-01-12 ENCOUNTER — HOSPITAL ENCOUNTER (OUTPATIENT)
Facility: HOSPITAL | Age: 64
Setting detail: RECURRING SERIES
Discharge: HOME | End: 2024-01-15
Payer: COMMERCIAL

## 2024-01-12 PROCEDURE — 90853 GROUP PSYCHOTHERAPY: CPT

## 2024-01-12 NOTE — BH NOTE
Group Therapy Note    Date: 1/12/2024    Group Start Time: 11:00 AM  Group End Time: 11:50 AM  Group Topic: Psychotherapy    [facility] BEHAVIORAL TH OP    Shameka Murphy, BRETT        Group Therapy Note    Attendees: 8  scheduled    Focus of group session was a discussion on anxiety and what triggers anxiety and what anxiety feels like for each group member.  We spoke about grounding techniques and the importance of returning to the present moment when anxiety is high and thoughts are starting to race and become irrational and fearful.  We spoke about the techniques shared in an article from \"[employer]\" and the techniques include square breathing, developing mantras, touching and describing objects around us, and using declarative memory (name all the dog breeds you know, colors.)  We spoke about what techniques can be helpful for each group member.         Patient's Goal:  1UM F31.81 E “Pt will use stop, look, listen, think, and plan before acting once a day and identify healthy responses.”     Notes:  Pt listened to the group activity and discussion.  He observed the others but remained quiet for most of the group.  He shared that he did not get much sleep last night because of his back pain but he he was wearing a patch now.  We discussed his upcoming new pt medical appointment and he agreed to make a list of concerns to take with him.      Status After Intervention:  Unchanged    Participation Level: Active Listener    Participation Quality: Appropriate and Attentive      Speech:  normal      Thought Process/Content: Logical      Affective Functioning: Congruent      Mood: euthymic      Level of consciousness:  Alert and Attentive      Response to Learning: Able to verbalize current knowledge/experience      Modes of Intervention: Education, Support, Socialization, Exploration, Clarifying, Problem-solving, and Activity      Discipline

## 2024-01-12 NOTE — BH NOTE
Group Therapy Note    Date: 1/12/2024    Group Start Time: 10:00 AM  Group End Time: 10:50 AM  Group Topic: Psychotherapy    [facility] BEHAVIORAL TH OP    Marina, Larissa PASCUAL, LCSW        Group Therapy Note    Attendees:  8 scheduled    Focus of session was based on information from [employer] “Your Struggle vs What to Add.”  We spoke about these struggles can also be bad habits that we have developed over time and the ideas of what to add can help patient break this bad habit.  We spoke about the struggles/habits of having clingy, needy behavior, overanalyzing, putting others before ourselves, losing self in relationships, being dependent on others and taking on other people's moods and issues.  We discussed the change and the additions that can be added to life and worked on a plan to overcome a particular struggle.     Behavioral Health Daily Check In form revealed that patient is not experiencing SI/HI thoughts or plans, remaining abstinent from drugs and alcohol, and report's goal today of  \"getting through each day.\"      Patient's Goal:  1. UM F 31.81  I.  “Pt will share times in which he has overcome difficult emotional moments and the skills he can see that he used.”      Notes:  Nato participated in group well and he was able to share in group without prompting. He spoke about his struggle with being alone and he recognizes that he does not have any hobbies or experiences that he has enjoyed.  He spoke about how his past ideas of fun are \"drinking beers and doing drugs.\"  We continue to talk about how he is aware of his limitations and he wants to try new things. He was open to ideas for suggestions.      Status After Intervention:  Unchanged    Participation Level: Active Listener and Interactive    Participation Quality: Appropriate, Attentive, Sharing, and Supportive      Speech:  normal      Thought Process/Content:

## 2024-01-12 NOTE — BH NOTE
Group Therapy Note    Date: 1/12/2024    Group Start Time: 12:00 PM  Group End Time: 12:50 PM  Group Topic: Psychotherapy    [facility] BEHAVIORAL TH OP    Larissa Gray, LCSW        Group Therapy Note    Attendees:  8 scheduled  Focus of session was based on information from “self compassion.org” and tips for practicing self-compassion daily.  We spoke about the issues that can occur when starting a new self-compassion daily habit and that it is important to know emotional pain may intensify at first before it becomes helpful.  We spoke about ways to overcome this temporary setback.  We went over a loving kindness meditation that can be used daily and went over the strategies to begin a loving kindness mediation.         Patient's Goal:  1. UM F 31.81  L.  “Pt will verbalize the impact that he notices on his mood and her functioning when he stops medications or misses doses and how he can challenge thoughts to miss or not prioritize taking medications consistently.”    Notes:  Nato participated in group with prompting.  He spoke about how he does not have a lot of patience for himself and knowing what he is feeling.  We spoke about why this may be a good skill for him to work to develop and he agreed with that.  He was open to feedback and support.      Status After Intervention:  Unchanged    Participation Level: Active Listener and Interactive    Participation Quality: Appropriate, Attentive, Sharing, and Supportive      Speech:  normal      Thought Process/Content: Logical  Linear      Affective Functioning: Congruent      Mood: anxious      Level of consciousness:  Alert, Oriented x4, and Attentive      Response to Learning: Able to verbalize current knowledge/experience, Able to retain information, and Capable of insight      Modes of Intervention: Education, Support, Socialization, Exploration, and Clarifying      Discipline Responsible:

## 2024-01-15 ENCOUNTER — HOSPITAL ENCOUNTER (OUTPATIENT)
Facility: HOSPITAL | Age: 64
Setting detail: RECURRING SERIES
Discharge: HOME | End: 2024-01-18
Payer: COMMERCIAL

## 2024-01-15 PROCEDURE — 90853 GROUP PSYCHOTHERAPY: CPT

## 2024-01-15 NOTE — BH NOTE
Group Therapy Note    Date: 1/15/2024    Group Start Time: 12:00 PM  Group End Time: 12:50 PM  Group Topic: Psychotherapy    [facility] BEHAVIORAL [facility] OP    Shameka Murphy, BRETT        Group Therapy Note    Attendees: 10 scheduled    Focus of session was a general overview of triggers, symptoms, and thoughts associated with anxiety.  Group members spoke about recent experiences with anxiety and how they coped and reacted.  I shared with group members positive coping thoughts to use in moments of anxiety.  We spoke about the coping thoughts that include; I can handle this, my feelings won't kill me, my thoughts don't control my life, I do, I am strong enough to handle what is happening to me, I have survived other painful experiences, I can survive this one, and this is an opportunity for me to cope with my fears.  We spoke about which coping strategies can be used to help group members at this time to help reduce anxiety and let the feelings and symptoms pass.       Patient's Goal:  1Um F31.81 J “Pt will verbally express understanding of the relationship between depressed mood & repression of feelings such as anger, hurt, and sadness.”     Notes:  Pt followed along with the group activity and discussion.  He got up a couple of times but returned to the group. He stated that he was having a hard time getting comfortable and remained observant but quiet for the rest of the group     Status After Intervention:  Unchanged    Participation Level: Active Listener and Minimal    Participation Quality: Appropriate      Speech:  normal      Thought Process/Content: Perseverating      Affective Functioning: Flat      Mood: euthymic      Level of consciousness:  Attentive and Preoccupied      Response to Learning: Able to verbalize current knowledge/experience      Modes of Intervention: Support, Activity, and Movement      Discipline Responsible: Social

## 2024-01-15 NOTE — BH NOTE
Group Therapy Note    Date: 1/15/2024    Group Start Time: 11:00 AM  Group End Time: 11:50 AM  Group Topic: Psychotherapy    [facility] BEHAVIORAL TH OP    Marina, Larissa PASCUAL, LCSW        Group Therapy Note    Attendees:  10 scheduled  Focus of session was based on handout from [employer] “Lapse and Relapse Management.”  We spoke about the tendency to have setbacks when we are pursuing a new plan of action or changes, we want to make, and we discussed how this handout can be helpful to process the setbacks.  We spoke about what have been recent lapses and setbacks and how to use lessons and hindsight to help us move forward.  We also spoke about when are likely to be more vulnerable to setbacks and how patient can manage during those vulnerable times.        Patient's Goal:  1. UM F 31.81  L. “Pt will verbalize the impact that he notices on his mood and her functioning when he stops medications or misses doses and how he can challenge thoughts to miss or not prioritize taking medications consistently    Notes:  Nato participated in group minimally but he was an active listener. He spoke about how he is focused on \"moving on and not thinking about the past.\"  He spoke about how he has been trying to do that and he spoke about how it has helped to be connected to other people here while in treatment.      Status After Intervention:  Unchanged    Participation Level: Active Listener and Minimal    Participation Quality: Appropriate, Attentive, Sharing, and Supportive      Speech:  normal      Thought Process/Content: Logical  Linear      Affective Functioning: Congruent      Mood: anxious      Level of consciousness:  Alert, Oriented x4, and Attentive      Response to Learning: Able to verbalize current knowledge/experience        Modes of Intervention: Education, Support, Socialization, Exploration, and Clarifying      Discipline Responsible: Social

## 2024-01-16 ENCOUNTER — HOSPITAL ENCOUNTER (OUTPATIENT)
Facility: HOSPITAL | Age: 64
Setting detail: RECURRING SERIES
Discharge: HOME | End: 2024-01-19
Payer: COMMERCIAL

## 2024-01-16 PROCEDURE — 90853 GROUP PSYCHOTHERAPY: CPT

## 2024-01-16 NOTE — BH NOTE
Group Therapy Note    Date: 1/16/2024    Group Start Time: 10:00 AM  Group End Time: 10:50 AM  Group Topic: Psychotherapy    [facility] BEHAVIORAL [facility] OP    Larissa Gray, LCSW        Group Therapy Note    Attendees: 10 scheduled    Focus of session was on emotional intelligence and the skills that we can build to increase emotional strength and competence.  We spoke about the components of EI (Emmanuel, 1997) and they include the ability to identify and discuss emotions, ability to regulate and control emotions, ability to read, label, and interpret other people's emotions, and the ability to sustain healthy interpersonal relationships.  I addressed with group members 5 skills that they can focus on to increase EI and they include the ability to quickly reduce stress, to recognize the emotions that is being felt, the ability to connect with others using non verbal's that are healthy, the ability to use humor to deal with challenges, and the ability to resolve conflicts positively and with confidence.  Group members discussed which skill they can practice and improve upon in this upcoming week.   Behavioral Health Daily Check In form revealed that patient is not experiencing SI/HI thoughts or plans, remaining abstinent from drugs and alcohol, and report's goal today of   \"I am here for help.\"      Patient's Goal:  1. UM F 31.81  E.  “Pt will use stop, look, listen, think, and plan before acting once a day and identify healthy responses.”     Notes:  Nato participated in group with prompting.  He was able to engage with questions and encouragement. He continues to talk about how he is starting to be more aware of how he interacts with others and it is good awareness in his opinion.      Status After Intervention:  Unchanged    Participation Level: Active Listener and Interactive    Participation Quality: Appropriate, Attentive, Sharing, and 
                                                                      Group Therapy Note    Date: 1/16/2024    Group Start Time: 12:00 PM  Group End Time: 12:50 PM  Group Topic: Psychotherapy    [facility] BEHAVIORAL TH OP    Larissa Gray, LCSW        Group Therapy Note    Attendees:  10 scheduled    Focus of session was based on information from “[employer]” on an overview of how to develop psychological flexibility.  We discussed how flexibility can be a good characteristic and ability overall and we spoke about how psychological flexibility means to work on skills of being present, allowing it to be just as it is, unhooking from difficult thoughts, noticing myself as an observer, remembering or clarifying your values, and to do what is important to you and live by your values.  We spoke about how inflexibility has caused group members harm and what they are willing to work on changing.       Patient's Goal:  1. UM F 31.81  G.  Pt will identify some outlets he can pursue at home to provide some distraction or pleasure to help him manage his thoughts, moods, and feelings    Notes:  Nato participated in group with prompting.  He was quiet but he was listening to others.  He was getting frustrated with the amount that people were talking but he was able to manage his emotions.  He was able to talk about how \"I want to move on from the past and to stay in the present.\"      Status After Intervention:  Unchanged    Participation Level: Active Listener, Interactive, and Minimal    Participation Quality: Appropriate, Attentive, Sharing, and Supportive      Speech:  normal      Thought Process/Content: Logical  Linear      Affective Functioning: Congruent      Mood: anxious and irritable      Level of consciousness:  Alert, Oriented x4, and Attentive      Response to Learning: Able to verbalize current knowledge/experience, Able to retain information, and Capable of insight      Endings: None Reported    Modes of 
Learning: Able to verbalize current knowledge/experience, Able to verbalize/acknowledge new learning, Able to retain information, Capable of insight, Able to change behavior, and Progressing to goal      Modes of Intervention: Education, Support, Socialization, Exploration, Clarifying, and Problem-solving      Discipline Responsible: /Counselor      Signature:  Shameka Murphy LCSW

## 2024-01-17 ENCOUNTER — APPOINTMENT (OUTPATIENT)
Facility: HOSPITAL | Age: 64
End: 2024-01-17
Payer: COMMERCIAL

## 2024-01-19 ENCOUNTER — HOSPITAL ENCOUNTER (OUTPATIENT)
Facility: HOSPITAL | Age: 64
Setting detail: RECURRING SERIES
Discharge: HOME | End: 2024-01-22
Payer: COMMERCIAL

## 2024-01-19 PROCEDURE — 90853 GROUP PSYCHOTHERAPY: CPT

## 2024-01-19 NOTE — BH NOTE
Group Therapy Note    Date: 1/19/2024    Group Start Time: 12:00 PM  Group End Time: 12:50 PM  Group Topic: Psychotherapy    [facility] BEHAVIORAL TH OP    Larissa Gray LCSW        Group Therapy Note    Attendees: 8 scheduled    Focus of session was based on a handout develop by Susy Mahoney MA titled “How to Work Through Your Emotions.”  We went over the steps that handout named including naming the emotion, identifying the cause, identifying the behavior and thoughts, and challenge the emotion.  We spoke about the help and direction this handout can give to group members.  We spoke about recent emotions experienced and what can be learned from this and finding the healthy challenges to emotions.       Patient's Goal:  1. 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  “Pt will identify choices made over past several days and how they impacted him emotionally and cognitively.”     Notes:  Nato participated in group with prompting.  He spoke about how he knows that he is able to help him cope with emotions. He spoke about how he is focused on getting to work on his house and he has been preparing things so he can put in new drywall.      Status After Intervention:  Unchanged    Participation Level: Active Listener and Interactive    Participation Quality: Appropriate, Attentive, Sharing, and Supportive      Speech:  normal      Thought Process/Content: Logical      Affective Functioning: Congruent      Mood: anxious      Level of consciousness:  Alert, Oriented x4, Attentive, and Preoccupied      Response to Learning: Able to verbalize current knowledge/experience and Capable of insight      Endings: None Reported    Modes of Intervention: Education, Support, Socialization, Exploration, and Clarifying      Discipline Responsible: /Counselor      Signature:  Larissa Gray LCSW

## 2024-01-19 NOTE — BH NOTE
Group Therapy Note    Date: 1/19/2024    Group Start Time: 10:00 AM  Group End Time: 10:50 AM  Group Topic: Psychotherapy    [facility] BEHAVIORAL TH OP    Larissa Gray, LCSW        Group Therapy Note    Attendees: 8 scheduled      Focus of session was based on handout titled “what is the [place] of Control?”  We spoke about the concept of worry and the analogy of worrying is like sitting in a rocking chair, you use a lot of energy and  it gives you something to do but you don't get anywhere.”  We spoke about the differences in being a reactive person versus a proactive person and how proactive people behave and act.  We discussed strategies that will help us get to challenge what we are focusing on and return to give our energy to what he can control about situations.    Behavioral Health Daily Check In form revealed that patient is not experiencing SI/HI thoughts or plans, remaining abstinent from drugs and alcohol, and report's goal today of  \"to feel better.\"      Patient's Goal:  1. UM F 31.81  K.  “Pt will identify what his tentative plans for discharge from program look like and what kind of services he can see will be beneficial to him.”     Notes:  Nato participated in group with prompting.  He spoke about how he has been struggling with boredom and it is made worse by sleep issues.   He spoke about how he is concentrating on making good choices and he wants to continue to focus on cleaning up his house.     Status After Intervention:  Unchanged    Participation Level: Active Listener and Interactive    Participation Quality: Appropriate, Attentive, Sharing, and Supportive      Speech:  normal      Thought Process/Content: Logical      Affective Functioning: Congruent      Mood: anxious      Level of consciousness:  Alert, Oriented x4, and Attentive      Response to Learning: Able to verbalize current knowledge/experience and Able to  n/a

## 2024-01-19 NOTE — BH NOTE
Group Therapy Note    Date: 1/19/2024    Group Start Time: 11:00 AM  Group End Time: 11:50 AM  Group Topic: Psychotherapy    [facility] BEHAVIORAL TH OP    Shameka Murphy, BRETT        Group Therapy Note    Attendees: 8 scheduled    Focus of session was based on the quote of “Respect yourself enough to walk away from anything that no longer serves you, grows you, or makes you happy as well as the quote of “You will find it necessary to let things go; simply for the reason that they are heavy.”  Group members were asked to share what they need to, or who they need to walk away from for their own recovery.  We spoke about the struggles of letting go and how the thoughts or emotions tend to come back.  I spoke with group members that “letting go” does not mean we won't have to work on letting it go over and over and that it is okay to pop back in our minds and hearts.  I asked group members what or who they need to walk away from starting today.         Patient's Goal:  1Um F31.81 G “Pt will identify some outlets he can pursue at home to provide some distraction or pleasure to help him manage his thoughts, moods, and feelings.”       Notes:  Pt participated in the group activity and discussion.  He stated that he was excited to finally be able to go to the Dr on Tuesday.  He has agreed to make a list of his concerns to share with the group on Monday.  He wants to be prepared as this is a new doctor for him     Status After Intervention:  Improved    Participation Level: Active Listener and Interactive    Participation Quality: Appropriate, Attentive, and Sharing      Speech:  normal      Thought Process/Content: Logical      Affective Functioning: Congruent      Mood: brighter      Level of consciousness:  Alert, Oriented x4, and Attentive      Response to Learning: Able to verbalize current knowledge/experience, Able to verbalize/acknowledge new

## 2024-01-22 ENCOUNTER — HOSPITAL ENCOUNTER (OUTPATIENT)
Facility: HOSPITAL | Age: 64
Setting detail: RECURRING SERIES
Discharge: HOME | End: 2024-01-25
Payer: COMMERCIAL

## 2024-01-22 PROCEDURE — 90853 GROUP PSYCHOTHERAPY: CPT

## 2024-01-22 NOTE — BH NOTE
Group Therapy Note    Date: 1/22/2024    Group Start Time: 11:00 AM  Group End Time: 11:50 AM  Group Topic: Psychotherapy    [facility] BEHAVIORAL TH OP    Marina[facility] S[facility]        Group Therapy Note    Attendees:  11 scheduled    Focus of session was based on article by Arthur Truong titled “what We All Want and Need but Can't Control.”  We spoke about article and how it is focused on seeing other people's approval and why the seeking is not something that we can stop but we can step back and learn to manage it.  We spoke about the goal is to unconnect our self esteem from others approving of us.  We went over the strategies to help ground our self-esteem in what we can control through seeking approval through ourselves, work on our worry about what others think, learning how to talk back to negative voices in our head, and learn to take criticism as feedback.         Patient's Goal:  1. UM F 31.81  G.  “Pt will identify some outlets he can pursue at home to provide some distraction or pleasure to help him manage his thoughts, moods, and feelings    Notes:  Nato participated in group with prompting. He spoke about how he knows that he does not worry too much about \"whether or not I do a good job or not.\"  He was talking about getting approval for his work and we spoke about how he has confidence in his work so that helps.  We spoke about how he does sometimes worry that \"I make people mad\" and we spoke about what he can do to try and manage that.      Status After Intervention:  Unchanged    Participation Level: Active Listener and Interactive    Participation Quality: Appropriate, Attentive, Sharing, and Supportive      Speech:  normal      Thought Process/Content: Logical      Affective Functioning: Congruent      Mood: anxious      Level of consciousness:  Alert, Oriented x4, and Attentive      Response to Learning: Able to verbalize current

## 2024-01-22 NOTE — BH NOTE
Group Therapy Note    Date: 1/22/2024    Group Start Time: 12:00 PM  Group End Time: 12:50 PM  Group Topic: Psychotherapy    [facility] BEHAVIORAL TH OP    Shameka Murphy, BRETT        Group Therapy Note    Attendees: 11 Scheduled    Focus of session was based on the quote of “Respect yourself enough to walk away from anything that no longer serves you, grows you, or makes you happy as well as the quote of “You will find it necessary to let things go; simply for the reason that they are heavy.”  Group members were asked to share what they need to, or who they need to walk away from for their own recovery.  We spoke about the struggles of letting go and how the thoughts or emotions tend to come back.  I spoke with group members that “letting go” does not mean we won't have to work on letting it go over and over and that it is okay to pop back in our minds and hearts.  I asked group members what or who they need to walk away from starting today.         Patient's Goal:  1UM F31.81 E .  “Pt will use stop, look, listen, think, and plan before acting once a day and identify healthy responses.”     Notes:  Pt again listened to the group discussion and shared that he is anxious to get answers tomorrow when he goes to the doctor.  He is experiencing pain and wants some type of relief.  Pt was able to initiate a conversation with another member today, positively working toward his goals of socializing more.     Status After Intervention:  Improved    Participation Level: Active Listener and Interactive    Participation Quality: Appropriate, Attentive, Sharing, and Supportive      Speech:  normal      Thought Process/Content: Logical      Affective Functioning: Congruent      Mood: euthymic      Level of consciousness:  Alert and Attentive      Response to Learning: Able to verbalize current knowledge/experience, Able to verbalize/acknowledge new learning,

## 2024-01-22 NOTE — GROUP NOTE
Group Therapy Note    Date: 1/22/2024    Group Start Time: 10:00 AM  Group End Time: 10:50 AM  Group Topic: Psychotherapy    [facility] BEHAVIORAL [facility] OP    Shameka Murphy LCSW        Group Therapy Note    Attendees: 11 scheduled    Focus of session was a review of the weekend and successes that can be identified in recognizing and managing triggers to negative feeling states.  We spoke about what was done in managing triggers that led to success and what was done or not done that led to struggles in maintaining and getting through difficult moments.  We also spoke about goals to work towards this week and what is reasonable to accomplish by the end of the week.     Behavioral Health Daily Check In form revealed that pt is not experiencing SI/HI thoughts or plans, remaining abstinent from drugs and alcohol, and reports goal today to get help.     Patient's Goal:   1UM F31.81 J “Pt will verbally express understanding of the relationship between depressed mood & repression of feelings such as anger, hurt, and sadness    Notes:  Pt listened to the group discussion and the concerns of others.  He was cooperative and attentive but remained quiet during the session    Status After Intervention:  Unchanged    Participation Level: Active Listener    Participation Quality: Appropriate      Speech:  normal      Thought Process/Content: Logical      Affective Functioning: Congruent      Mood: euthymic      Level of consciousness:  Alert and Attentive      Response to Learning: Able to verbalize current knowledge/experience      Modes of Intervention: Education      Discipline Responsible: /Counselor      Signature:  Shameka Murphy LCSW

## 2024-01-23 ENCOUNTER — OFFICE VISIT (OUTPATIENT)
Age: 64
End: 2024-01-23
Payer: COMMERCIAL

## 2024-01-23 VITALS
TEMPERATURE: 97 F | HEART RATE: 63 BPM | HEIGHT: 66 IN | WEIGHT: 159 LBS | OXYGEN SATURATION: 94 % | DIASTOLIC BLOOD PRESSURE: 70 MMHG | BODY MASS INDEX: 25.55 KG/M2 | RESPIRATION RATE: 18 BRPM | SYSTOLIC BLOOD PRESSURE: 110 MMHG

## 2024-01-23 DIAGNOSIS — Z76.89 ENCOUNTER TO ESTABLISH CARE: ICD-10-CM

## 2024-01-23 DIAGNOSIS — G89.29 CHRONIC LEFT-SIDED LOW BACK PAIN WITH LEFT-SIDED SCIATICA: ICD-10-CM

## 2024-01-23 DIAGNOSIS — M54.42 CHRONIC LEFT-SIDED LOW BACK PAIN WITH LEFT-SIDED SCIATICA: ICD-10-CM

## 2024-01-23 DIAGNOSIS — M79.10 TRIGGER POINT: ICD-10-CM

## 2024-01-23 DIAGNOSIS — M54.32 SCIATICA OF LEFT SIDE: Primary | ICD-10-CM

## 2024-01-23 PROCEDURE — 3078F DIAST BP <80 MM HG: CPT | Performed by: NURSE PRACTITIONER

## 2024-01-23 PROCEDURE — 20552 NJX 1/MLT TRIGGER POINT 1/2: CPT | Performed by: NURSE PRACTITIONER

## 2024-01-23 PROCEDURE — 3074F SYST BP LT 130 MM HG: CPT | Performed by: NURSE PRACTITIONER

## 2024-01-23 PROCEDURE — 99203 OFFICE O/P NEW LOW 30 MIN: CPT | Performed by: NURSE PRACTITIONER

## 2024-01-23 RX ORDER — IBUPROFEN 800 MG/1
800 TABLET ORAL 2 TIMES DAILY PRN
Qty: 180 TABLET | Refills: 0 | Status: SHIPPED | OUTPATIENT
Start: 2024-01-23

## 2024-01-23 RX ORDER — TRIAMCINOLONE ACETONIDE 40 MG/ML
40 INJECTION, SUSPENSION INTRA-ARTICULAR; INTRAMUSCULAR ONCE
Status: COMPLETED | OUTPATIENT
Start: 2024-01-23 | End: 2024-01-23

## 2024-01-23 RX ADMIN — TRIAMCINOLONE ACETONIDE 40 MG: 40 INJECTION, SUSPENSION INTRA-ARTICULAR; INTRAMUSCULAR at 10:52

## 2024-01-23 ASSESSMENT — PATIENT HEALTH QUESTIONNAIRE - PHQ9
SUM OF ALL RESPONSES TO PHQ QUESTIONS 1-9: 7
3. TROUBLE FALLING OR STAYING ASLEEP: 1
10. IF YOU CHECKED OFF ANY PROBLEMS, HOW DIFFICULT HAVE THESE PROBLEMS MADE IT FOR YOU TO DO YOUR WORK, TAKE CARE OF THINGS AT HOME, OR GET ALONG WITH OTHER PEOPLE: 2
7. TROUBLE CONCENTRATING ON THINGS, SUCH AS READING THE NEWSPAPER OR WATCHING TELEVISION: 1
8. MOVING OR SPEAKING SO SLOWLY THAT OTHER PEOPLE COULD HAVE NOTICED. OR THE OPPOSITE, BEING SO FIGETY OR RESTLESS THAT YOU HAVE BEEN MOVING AROUND A LOT MORE THAN USUAL: 1
SUM OF ALL RESPONSES TO PHQ QUESTIONS 1-9: 7
SUM OF ALL RESPONSES TO PHQ QUESTIONS 1-9: 7
5. POOR APPETITE OR OVEREATING: 1
SUM OF ALL RESPONSES TO PHQ QUESTIONS 1-9: 7
4. FEELING TIRED OR HAVING LITTLE ENERGY: 1
2. FEELING DOWN, DEPRESSED OR HOPELESS: 1
9. THOUGHTS THAT YOU WOULD BE BETTER OFF DEAD, OR OF HURTING YOURSELF: 0
6. FEELING BAD ABOUT YOURSELF - OR THAT YOU ARE A FAILURE OR HAVE LET YOURSELF OR YOUR FAMILY DOWN: 1

## 2024-01-23 ASSESSMENT — SOCIAL DETERMINANTS OF HEALTH (SDOH)
ARE YOU MARRIED, WIDOWED, DIVORCED, SEPARATED, NEVER MARRIED, OR LIVING WITH A PARTNER?: NEVER MARRIED
HOW OFTEN DO YOU ATTENT MEETINGS OF THE CLUB OR ORGANIZATION YOU BELONG TO?: MORE THAN 4 TIMES PER YEAR
DO YOU BELONG TO ANY CLUBS OR ORGANIZATIONS SUCH AS CHURCH GROUPS UNIONS, FRATERNAL OR ATHLETIC GROUPS, OR SCHOOL GROUPS?: NO
IN A TYPICAL WEEK, HOW MANY TIMES DO YOU TALK ON THE PHONE WITH FAMILY, FRIENDS, OR NEIGHBORS?: MORE THAN THREE TIMES A WEEK
HOW OFTEN DO YOU ATTEND CHURCH OR RELIGIOUS SERVICES?: NEVER
HOW OFTEN DO YOU GET TOGETHER WITH FRIENDS OR RELATIVES?: MORE THAN THREE TIMES A WEEK

## 2024-01-23 NOTE — PROGRESS NOTES
Subjective:     Naot Small is a 63 y.o. 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 Blood pressure normotensive.    depression screening addressed not at risk    abuse screening addressed denies    learning assessment addressed reviewed nurses notes    fall risk addressed not at risk    HM: addressed    ROS:  Gen: denies fever, chills, fatigue, weight loss, weight gain  HEENT:denies blurry vision, nasal congestion, sore throat  Resp: denies dypsnea, cough, wheezing  CV: denies chest pain radiating to the jaws or arms, palpitations, lower extremity edema  Abd: denies nausea, vomiting, diarrhea, constipation  Neuro: denies numbness/tingling  Endo: denies polyuria, polydipsia, heat/cold intolerance  Heme: no lymphadenopathy    No Known Allergies      Current Outpatient Medications:     ibuprofen (ADVIL;MOTRIN) 800 MG tablet, Take 1 tablet by mouth 2 times daily as needed for Pain, Disp: 180 tablet, Rfl: 0    escitalopram (LEXAPRO) 20 MG tablet, Take 1 tablet by mouth daily, Disp: 30 tablet, Rfl: 11    lisinopril (PRINIVIL;ZESTRIL) 20 MG tablet, Take 1 tablet by mouth daily, Disp: 90 tablet, Rfl: 1    lidocaine (LIDODERM) 5 %, Place 1 patch onto the skin daily 12 hours on, 12 hours off., Disp: 30 patch, Rfl: 0    amLODIPine (NORVASC) 10 MG tablet, Take 1 tablet by mouth daily, Disp: 90 tablet, Rfl: 1    naproxen (NAPROSYN) 500 MG tablet, Take 1 tablet by mouth 2 times daily (with meals), Disp: 180 tablet, Rfl: 0    cetirizine (ZYRTEC) 10 MG tablet, Take 1 tablet by mouth daily,

## 2024-01-23 NOTE — PROGRESS NOTES
Chief Complaint   Patient presents with    Establish Care       Vitals:    01/23/24 0934   BP: 110/70   Pulse: 63   Resp: 18   Temp: 97 °F (36.1 °C)   SpO2: 94%

## 2024-01-23 NOTE — PROGRESS NOTES
\"Have you been to the ER, urgent care clinic since your last visit?  Hospitalized since your last visit?\"    Yes [facility] ER  back pain running into left leg    “Have you seen or consulted any other health care providers outside of [facility] since your last visit?”      No

## 2024-01-25 ENCOUNTER — HOSPITAL ENCOUNTER (OUTPATIENT)
Facility: HOSPITAL | Age: 64
Setting detail: RECURRING SERIES
End: 2024-01-25
Payer: COMMERCIAL

## 2024-01-25 PROCEDURE — 90853 GROUP PSYCHOTHERAPY: CPT

## 2024-01-25 PROCEDURE — 99214 OFFICE O/P EST MOD 30 MIN: CPT | Performed by: PSYCHIATRY & NEUROLOGY

## 2024-01-25 RX ORDER — ACETAMINOPHEN 500 MG
500 TABLET ORAL EVERY 6 HOURS PRN
Qty: 120 TABLET | Refills: 3 | Status: SHIPPED | OUTPATIENT
Start: 2024-01-25

## 2024-01-25 RX ORDER — ESCITALOPRAM OXALATE 20 MG/1
30 TABLET ORAL DAILY
Qty: 45 TABLET | Refills: 0 | Status: SHIPPED | OUTPATIENT
Start: 2024-01-25 | End: 2024-01-26

## 2024-01-25 RX ORDER — ESCITALOPRAM OXALATE 20 MG/1
30 TABLET ORAL DAILY
Qty: 45 TABLET | Refills: 11 | Status: SHIPPED | OUTPATIENT
Start: 2024-01-25 | End: 2024-01-26

## 2024-01-25 RX ORDER — ESCITALOPRAM OXALATE 20 MG/1
30 TABLET ORAL DAILY
Qty: 45 TABLET | Refills: 0 | Status: SHIPPED | OUTPATIENT
Start: 2024-01-25 | End: 2024-01-25

## 2024-01-25 RX ORDER — ACETAMINOPHEN 500 MG
500 TABLET ORAL EVERY 6 HOURS PRN
COMMUNITY
End: 2024-01-25 | Stop reason: SDUPTHER

## 2024-01-25 NOTE — TELEPHONE ENCOUNTER
Medication Refill Request    Nato Small is requesting a refill of the following medication(s):     acetaminophen (TYLENOL) 325 MG tablet     Please send refill to:       [facility] - [place] - [facility] -  [phone] - F [phone]  [facility]  [place] [address]  Phone: [phone] Fax: [phone]

## 2024-01-25 NOTE — BH NOTE
Group Therapy Note    Date: 1/25/2024    Group Start Time: 11:00 AM  Group End Time: 11:50 AM  Group Topic: Psychotherapy    [facility] BEHAVIORAL TH OP    Shameka Murphy LCSW        Group Therapy Note    Attendees: 10 scheduled    Focus of session was based on MARILYNN from Virgilio Goyal and how to manage codependent and/or unhealthy toxic relationships.  We spoke about what those relationships look like and feel like in their lives right now and what they would like to change.  Therapist reviewed that MARILYNN stands for Justifying, Arguing, Defending, Explaining.  We spoke about how and why we can fall into these habits with toxic/unhealthy people.  Group members were asked to identify one behavior that they are willing to change and we spoke about how they can work on doing that.         Patient's Goal:  1UM F31.81 L .  “Pt will verbalize the impact that he notices on his mood and her functioning when he stops medications or misses doses and how he can challenge thoughts to miss or not prioritize taking medications consistently.”     Notes:  Pt listened to the group activity and discussion.  When the group was discussing relationships and Facebook, he stated that he does not participate in Facebook activity.  He also shared that he was pleased with his new doctor that he was able to see on Tuesday.  She was able to help him and feels she listens.     Status After Intervention:  Improved    Participation Level: Active Listener    Participation Quality: Appropriate and Attentive      Speech:  normal      Thought Process/Content: Logical      Affective Functioning: Congruent      Mood: euthymic      Level of consciousness:  Alert and Attentive      Response to Learning: Able to verbalize current knowledge/experience, Able to retain information, Able to change behavior, and Progressing to goal      Modes of Intervention: Education, Support, Socialization,

## 2024-01-25 NOTE — BH NOTE
Group Therapy Note    Date: 1/25/2024    Group Start Time: 12:00 PM  Group End Time: 12:50 PM  Group Topic: Psychotherapy    [facility] BEHAVIORAL TH OP    Larissa Gray LCSW        Group Therapy Note    Attendees: 10 scheduled    unhealthy boundaries.  We explored current relationships with group members and identified what kinds of boundaries are present and what boundaries need to be set.  We spoke about physical, emotional, time, and intimate boundaries that we can set.  We also spoke about how to allow others to set boundaries with us and to work to equip them with how to support in times of need and/or crisis.  We identified what changes we can make today.       Patient's Goal:  1. UM F  31.81  M.  Pt will identify what he can do to build up his self esteem, supportive relationships, and his personal empowerment to help increase confidence for discharge    Notes:  Nato participated in group with prompting.  He spoke about how he knows that he needs to set some boundaries for himself. He spoke about how he has better relationships with family members now that he is not drinking and using drugs.      Status After Intervention:  Unchanged    Participation Level: Active Listener and Interactive    Participation Quality: Appropriate, Attentive, Sharing, and Supportive      Speech:  normal      Thought Process/Content: Logical  Linear      Affective Functioning: Congruent      Mood: anxious and depressed      Level of consciousness:  Alert, Oriented x4, and Attentive      Response to Learning: Able to verbalize current knowledge/experience, Able to retain information, and Capable of insight      Endings: None Reported    Modes of Intervention: Education, Support, Socialization, Exploration, Clarifying, and Problem-solving      Discipline Responsible: /Counselor      Signature:  Larissa Gray LCSW

## 2024-01-25 NOTE — PROGRESS NOTES
SOP PROGRESS NOTE    INTERVAL HISTORY/FINDINGS:   States he's still been feeling \"pretty good\" affectively, although he's still not fully euthymic. He recognizes that he's improved a lot, and that his N/V functioning has been much better, but he's still slightly anhedonic and emotionally flat. He's also having some middle insomnia, but he also naps on occasion, so he'll first try to stay awake all day consistently to see if it improves. We discussed Rx options and I'll increase the Lexapro to 30 mg to see if it helps. His appetite is stronger with the Abilify and he's gained some weight 155# which is his historical baseline weight. He's very comfortable continuing the Lexapro, VPA, Abilify, and Trazodone for now. He denies having any SE's with them at all. No mood instability or hypomanic symptoms at all. Still stuck at home, but coming to [facility] really helps him, emotionally. He denies any SI or full hopelessness.     MEDICATIONS:  Current Outpatient Medications   Medication Sig    ibuprofen (ADVIL;MOTRIN) 800 MG tablet Take 1 tablet by mouth 2 times daily as needed for Pain    escitalopram (LEXAPRO) 20 MG tablet Take 1 tablet by mouth daily    lisinopril (PRINIVIL;ZESTRIL) 20 MG tablet Take 1 tablet by mouth daily    lidocaine (LIDODERM) 5 % Place 1 patch onto the skin daily 12 hours on, 12 hours off.    amLODIPine (NORVASC) 10 MG tablet Take 1 tablet by mouth daily    naproxen (NAPROSYN) 500 MG tablet Take 1 tablet by mouth 2 times daily (with meals)    cetirizine (ZYRTEC) 10 MG tablet Take 1 tablet by mouth daily    ARIPiprazole (ABILIFY) 2 MG tablet Take 1 tablet by mouth daily    traZODone (DESYREL) 100 MG tablet Take 1 tablet by mouth nightly    divalproex (DEPAKOTE) 250 MG DR tablet Take 1 tablet by mouth 2 times daily    acetaminophen (TYLENOL) 500 MG tablet Take 2 tablets by mouth 3 times daily    acetaminophen (TYLENOL) 325 MG tablet Take 2 tablets by mouth every 6 hours as needed for Pain (Patient not  taking: Reported on 1/23/2024)    sucralfate (CARAFATE) 1 GM tablet     omeprazole (PRILOSEC) 40 MG delayed release capsule Take 1 capsule by mouth 2 times daily     No current facility-administered medications for this encounter.     MENTAL STATUS UPDATE: (Positives in Bold)  Appearance:   Neat   Disheveled  Odiferous   Appropriate  Orientation:   Time  Place  Person  Speech:   Coherent  Pressured  Garbled/Slurred  Loud   Soft  Mute  Memory:   Intact  Impaired (Recent  Remote)--Mild  Severe  Mood:   Euthymic  Depressed--very mild  Anxious  Elated  Affect:    Appropriate  Inappropriate  Labile  Blunted  Flat--slight  Thought Content:   Logical/Appropriate  Paranoid  Delusional  Illogical IOR  SI  HI  Thought Process:   Coherent/linear  Tangential  Loose/Disorganized   Hallucinations:   None  Auditory  Visual  Tactile  Olfactory  Gustatory  Insight:   Good  Fair  Impaired      Judgment:   Good  Fair  Impaired     CONTINUED NEED FOR TREATMENT: (Positives in Bold)  Continued psychiatric symptoms causing impairment in IDL  or functioning  Continued non-compliance with meds resulting in decompensation  High level of debilitation and symptoms with inadequate support  Continued need for structured care to adjust medications  Continued presence of debilitating symptoms  Continued presence of risk factors     CONTINUED NEED FOR GROUP PSYCHOTHERAPY TO ADDRESS THE FOLLOWING: (Positives in Bold)  Psychiatric symptom management       Psychiatric relapse prevention    Anxiety management      Self-esteem issues      Poor decision making       Recent decompensation       Safety issues      Anger management     Self care/ADL's     Med/treatment compliance      Impaired boundaries/relationships     Assertiveness      Grief/loss resolution       Guilt/shame issues     Realistic goal setting      DIAGNOSTIC IMPRESSION:   Possible Bipolar disorder type II, depressed--improving (F31.81).   Alcohol Use disorder with mild persisting

## 2024-01-25 NOTE — BH NOTE
Group Therapy Note    Date: 1/25/2024    Group Start Time: 10:00 AM  Group End Time: 10:50 AM  Group Topic: Psychotherapy    [facility] BEHAVIORAL TH OP    Larissa Gray, LCSW        Group Therapy Note    Attendees: 10 scheduled    Focus of session was a discussion on lifestyle strategies that can be implemented to treat symptoms of mental health in addition to needed medications.  We spoke about the areas of our lives that we do have control over daily including nutrition, exercise, sleep, and substances we are putting in our body. We spoke about how change has to be given a chance to have an impact and spoke with patient about trying a identified lifestyle strategy for at least 21 days but 3 months is ideal. We discussed ways to overcome obstacles and excuses that could get in the way of progress.       Behavioral Health Daily Check In form revealed that patient is not experiencing SI/HI thoughts or plans, remaining abstinent from drugs and alcohol, and report's goal today of \"try to get help and get better.\"        Patient's Goal:  1. 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  Pt will identify choices made over past several days and how they impacted him emotionally and cognitively.”     Notes:  Nato participated in group with prompting. He spoke about how he got an injection at the doctors on Tuesday for his pain but \"I still only slept two hours.\"  He spoke about how he is struggling to stay awake during the day and he ends up napping and he agreed he needs to try and push himself to stay occupied. We spoke about the idea of getting a job but he does not feel comfortable with working.      Status After Intervention:  Unchanged    Participation Level: Active Listener and Interactive    Participation Quality: Appropriate, Attentive, Sharing, and Supportive      Speech:  normal      Thought Process/Content: Logical  Linear      Affective Functioning: Congruent      Mood:

## 2024-01-26 ENCOUNTER — HOSPITAL ENCOUNTER (OUTPATIENT)
Facility: HOSPITAL | Age: 64
Setting detail: RECURRING SERIES
End: 2024-01-26
Payer: COMMERCIAL

## 2024-01-26 ENCOUNTER — TELEPHONE (OUTPATIENT)
Facility: CLINIC | Age: 64
End: 2024-01-26

## 2024-01-26 PROCEDURE — 90853 GROUP PSYCHOTHERAPY: CPT

## 2024-01-26 RX ORDER — ESCITALOPRAM OXALATE 20 MG/1
20 TABLET ORAL DAILY
Qty: 30 TABLET | Refills: 0 | Status: SHIPPED | OUTPATIENT
Start: 2024-01-26

## 2024-01-26 RX ORDER — ESCITALOPRAM OXALATE 20 MG/1
20 TABLET ORAL DAILY
Qty: 30 TABLET | Refills: 11 | Status: SHIPPED | OUTPATIENT
Start: 2024-01-26

## 2024-01-26 NOTE — TELEPHONE ENCOUNTER
Naila gabriel [facility] calling about the proper MG for a RX that was sent over. She would like a call back today 1.26.24, to go ahead and get it filled. She said the RX will be put on hold until they receive a call back.    CB# [phone] / Option 2  Reference# [id number]      I looked in patients chart and he had a New Patient appt with Eugenia Yung on 1.23.24

## 2024-01-26 NOTE — BH NOTE
Group Therapy Note    Date: 1/26/2024    Group Start Time: 11:00 AM  Group End Time: 11:50 AM  Group Topic: Psychotherapy    [facility] BEHAVIORAL TH OP    Larissa Gray, LCSW        Group Therapy Note    Attendees: 12 scheduled    Focus of session was based on information on handout “Sleep Hygiene” and strategies and tips for how to build a better sleep routine.  We spoke about what sleep hygiene is and why we want a practice and routine around sleep in order to help through difficult times of anxiety in our lives.  We spoke about strategies like regular routines like bed and wake up times, get up and try again in 20 minutes, avoid caffeine, nicotine, and alcohol, using a bed only for sleeping or sex, no napping during day, no clock watching, exercising and eating right, the right space, and having rituals at night.  We spoke about current sleep patterns and what strategy could be implemented.         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  Pt will work on identifying if current emotional state is based on external or internal factors to help her identify an effective coping strategy.”     Notes:  Nato reported that he did finally sleep about 6 hours last night and he woke up feeling better.  He spoke about his commitment to not taking naps.  He spoke about how he can see that there are other options for things to do to try and promote sleep.      Status After Intervention:  Unchanged    Participation Level: Active Listener and Interactive    Participation Quality: Appropriate, Attentive, Sharing, and Supportive      Speech:  normal      Thought Process/Content: Logical  Linear      Affective Functioning: Congruent      Mood: anxious      Level of consciousness:  Alert, Oriented x4, and Attentive      Response to Learning: Able to verbalize current knowledge/experience, Able to retain information, Capable of insight, and Able to change  behavior      Endings: None Reported    Modes of Intervention: Education, Support, Socialization, Exploration, and Clarifying      Discipline Responsible: /Counselor      Signature:  Larissa Gray LCSW

## 2024-01-26 NOTE — BH NOTE
Group Therapy Note    Date: 1/26/2024    Group Start Time: 12:00 PM  Group End Time: 12:50 PM  Group Topic: Psychotherapy    [facility] BEHAVIORAL TH OP    Shameka Murphy LCSW        Group Therapy Note    Attendees: 12 scheduled    Focus of session was based on MARILYNN from Virgilio Goyal and how to manage codependent and/or unhealthy toxic relationships.  We spoke about what those relationships look like and feel like in their lives right now and what they would like to change.  Therapist reviewed that MARILYNN stands for Justifying, Arguing, Defending, Explaining.  We spoke about how and why we can fall into these habits with toxic/unhealthy people.  Group members were asked to identify one behavior that they are willing to change and we spoke about how they can work on doing that.         Patient's Goal:  1UM F31.81 I .  “Pt will share times in which he has overcome difficult emotional moments and the skills he can see that he used.”      Notes:  Pt listened to the group activity and discussion.  He was supportive of another member who was sharing his feelings related to  experiences from the past.  He acknowledged that he too has felt anger toward others and the situations he has had to deal with. Pt shared he has not always handled his anger in the best way but since coming to group it has helped him      Status After Intervention:  Improved    Participation Level: Active Listener and Interactive    Participation Quality: Appropriate, Attentive, Sharing, and Supportive      Speech:  normal      Thought Process/Content: Logical      Affective Functioning: Congruent      Mood: brighter      Level of consciousness:  Alert and Attentive      Response to Learning: Able to verbalize current knowledge/experience, Able to verbalize/acknowledge new learning, Able to retain information, Capable of insight, Able to change behavior, and Progressing to goal      Modes  of Intervention: Education, Support, Socialization, Exploration, Clarifying, Problem-solving, and Activity      Discipline Responsible: /Counselor      Signature:  Shameka Murphy LCSW

## 2024-01-26 NOTE — BH NOTE
Group Therapy Note    Date: 1/26/2024    Group Start Time: 10:00 AM  Group End Time: 10:50 AM  Group Topic: Psychotherapy    [facility] BEHAVIORAL [facility] OP    Shameka Murphy LCSW        Group Therapy Note    Attendees: 12 scheduled    Focus of session was based on article “Six Principles for Managing Impulses to Maximize Life Satisfaction and Success.”  We reviewed the definition of an impulse which is “an impelling force or a sudden wish or urge that prompts an unpremeditated act or feeling.”  We spoke about the definition of being impulsive which is characterized by actions based on sudden desires, whims, or inclinations rather than careful thought.”  We spoke about a method to try and control impulses and it is PURRRRS which stands for Pause, Use, Reflect, Reason, Respond, Revise, and Stabilize.  We spoke about what our impulsive behavior is and how going through these steps may help us to slow down and how may that impact the emotional consequences to be more positive rather than negative.  I shared the six principles from the article which included: know your risks, plan for your risks, count to 10… a lot, Be mindful, get corrective feedback, and remember that Paul was not built in a day.     Behavioral Health Daily Check In form revealed that pt is not experiencing SI/HI thoughts or plans, remaining abstinent from drugs and alcohol, and reports goal today to try and find some work for tomorrow.     Patient's Goal:  1UM F31.81K .  “Pt will identify what his tentative plans for discharge from program look like and what kind of services he can see will be beneficial to him.”     Notes:  Pt listened to the group discussion and concerns of other members.  He was attentive and cooperative but remained quiet for most of the session.  He was able to share at the end that he is beginning to feel better since receiving a shot at the doctor visit this

## 2024-01-29 ENCOUNTER — APPOINTMENT (OUTPATIENT)
Facility: HOSPITAL | Age: 64
End: 2024-01-29
Payer: COMMERCIAL

## 2024-01-29 PROCEDURE — 90853 GROUP PSYCHOTHERAPY: CPT

## 2024-01-29 NOTE — BH NOTE
Group Therapy Note    Date: 1/29/2024    Group Start Time: 11:00 AM  Group End Time: 11:50 AM  Group Topic: Psychotherapy    [facility] BEHAVIORAL [facility] OP    Larissa Gray LCSW        Group Therapy Note    Attendees: 10 scheduled  Focus of session was on an article by Norah Patino LCSW titled “End Self Criticism and Learn Self-Acceptance.”  We spoke about where the habit of self-criticism can come from, and we are often repeating what we heard in younger years.  We also spoke about how it can stem from unrealistic expectations due to expecting perfection, which is unachievable.  We spoke about the importance of taking this on as a goal for recovery as this habit is often what can derail a person back into unnecessary anxiety and depression as a result of low self-esteem.  I shared with patient the strategies shared in article such as challenge the inner critic with rational questions, practice helpful self-talk, tell yourself what you needed to hear as a child, and focus on self-acceptance.  We spoke about what changes patient can and are willing to make.         Patient's Goal:  1. UM F 31.81  I.  Pt will share times in which he has overcome difficult emotional moments and the skills he can see that he used.    Notes:  Nato participated in group well and he was more active today. He spoke about how he saw his sister yesterday and \"she was giving me a hard time about not getting the house fixed up quicker.\"  He spoke about how he felt angry about it but that he did not overreact.  He spoke about how he will just let her know why it is taking him some time as he is waiting to get money so he can pay for supplies.     Status After Intervention:  Unchanged    Participation Level: Active Listener and Interactive    Participation Quality: Appropriate, Attentive, Sharing, and Supportive      Speech:  normal      Thought Process/Content:

## 2024-01-29 NOTE — BH NOTE
Group Therapy Note    Date: 1/29/2024    Group Start Time: 12:00 PM  Group End Time: 12:50 PM  Group Topic: Psychotherapy    [facility] BEHAVIORAL TH OP    Shameka Murphy LCSW        Group Therapy Note    Attendees: 10 scheduled     Focus of session was based on MARILYNN from Virgilio Goyal and how to manage codependent and/or unhealthy toxic relationships.  We spoke about what those relationships look like and feel like in their lives right now and what they would like to change.  Therapist reviewed that MARILYNN stands for Justifying, Arguing, Defending, Explaining.  We spoke about how and why we can fall into these habits with toxic/unhealthy people.  Group members were asked to identify one behavior that they are willing to change and we spoke about how they can work on doing that.         Patient's Goal:  1UM F31.81 G .  “Pt will identify some outlets he can pursue at home to provide some distraction or pleasure to help him manage his thoughts, moods, and feelings.”       Notes:  Pt participated in the group activity and read aloud from the worksheet related to healthy and unhealthy relationships.  Pt likes to read aloud and finds that as a strategy to help him engage with other group members. Pt is making progress toward his group goals.     Status After Intervention:  Improved    Participation Level: Active Listener and Interactive    Participation Quality: Appropriate, Attentive, Sharing, and Supportive      Speech:  normal      Thought Process/Content: Logical      Affective Functioning: Congruent      Mood: brighter      Level of consciousness:  Alert and Attentive      Response to Learning: Able to verbalize current knowledge/experience, Able to verbalize/acknowledge new learning, Able to retain information, Capable of insight, Able to change behavior, and Progressing to goal      Modes of Intervention: Education, Support, Socialization, Exploration,

## 2024-01-29 NOTE — BH NOTE
Group Therapy Note    Date: 1/29/2024    Group Start Time: 10:00 AM  Group End Time: 10:50 AM  Group Topic: Psychotherapy    [facility] BEHAVIORAL TH OP    Shameka Murphy LCSW        Group Therapy Note    Attendees: 10    Focus of session was a review of the weekend and successes that can be identified in recognizing and managing triggers to negative feeling states.  We spoke about what was done in managing triggers that led to success and what was done or not done that led to struggles in maintaining and getting through difficult moments.  We also spoke about goals to work towards this week and what is reasonable to accomplish by the end of the week.       Behavioral Health Daily Check In form revealed that pt is not experiencing SI/HI thoughts or plans, remaining abstinent from drugs and alcohol, and reports goal today deal with Soniya bothering me  Patient's Goal:  1UM F31.81 L “Pt will verbalize the impact that he notices on his mood and her functioning when he stops medications or misses doses and how he can challenge thoughts to miss or not prioritize taking medications consistently.”    Notes:  Pt stated that he was not feeling well today.  He did not sleep again last night and is feeling irritable.  He was cooperative and attentive in group but remained quiet for the rest of the group.     Status After Intervention:  Unchanged    Participation Level: Active Listener and Minimal    Participation Quality: Appropriate and Attentive      Speech:  normal      Thought Process/Content: Logical      Affective Functioning: Congruent      Mood: irritable      Level of consciousness:  Alert and Attentive      Response to Learning: Able to verbalize current knowledge/experience and Able to retain information      Modes of Intervention: Education, Support, Socialization, Exploration, and Clarifying      Discipline Responsible: Social

## 2024-01-30 ENCOUNTER — APPOINTMENT (OUTPATIENT)
Facility: HOSPITAL | Age: 64
End: 2024-01-30
Payer: COMMERCIAL

## 2024-01-30 PROCEDURE — 90853 GROUP PSYCHOTHERAPY: CPT

## 2024-01-30 NOTE — BH NOTE
Group Therapy Note    Date: 1/30/2024    Group Start Time: 12:00 PM  Group End Time: 12:50 PM  Group Topic: Psychotherapy    [facility]IOR[facility]    [facility]        Group Therapy Note    Attendees:  10 scheduled    Focus of session was on information from Dr. Tonio Parisi on urge surfing and how to use mindfulness to help cope with impulses and urges.  I shared with group about how urges are like waves and that urges usually peak about 20 to 30 minutes if we let them.  If we resist them, then we tend to worsen them and they also last longer.  I shared with group about how to surf an urge by practicing patience and acceptance and to practice breathing and to even visualize the wave to help cope and work through the urges and sensations and help progress towards a healthier habit and reaction.       Patient's Goal:  1. UM F 31.81  J.  Pt will verbally express understanding of the relationship between depressed mood & repression of feelings such as anger, hurt, and sadness    Notes:  Nato participated in group with prompting.  He spoke about how he is trying to work through moments of anger that he is experiencing. He spoke about how he is willing to practice this skill and could express understanding of what it could potentially do for him.      Status After Intervention:  Unchanged    Participation Level: Active Listener and Interactive    Participation Quality: Appropriate, Attentive, Sharing, and Supportive      Speech:  normal      Thought Process/Content: Logical      Affective Functioning: Congruent      Mood: anxious      Level of consciousness:  Alert, Oriented x4, and Attentive      Response to Learning: Able to verbalize current knowledge/experience, Able to retain information, and Capable of insight      Endings: None Reported    Modes of Intervention: Education, Support, Socialization, Exploration, and

## 2024-01-30 NOTE — BH NOTE
Group Therapy Note    Date: 1/30/2024    Group Start Time: 11:00 AM  Group End Time: 11:50 AM  Group Topic: Psychotherapy    [facility] BEHAVIORAL TH OP    Shameka Murphy, BRETT        Group Therapy Note    Attendees: 9 scheduled    Focus of session was on developing healthy boundaries with others and the signs of healthy and unhealthy boundaries.  We explored current relationships with group members and identified what kinds of boundaries are present and what boundaries need to be set.  We spoke about physical, emotional, time, and intimate boundaries that we can set.  We also spoke about how to allow others to set boundaries with us and to work to equip them with how to support in times of need and/or crisis.  We identified what changes we can make today.         Patient's Goal:  1UM F31.81 E .  “Pt will use stop, look, listen, think, and plan before acting once a day and identify healthy responses.”     Notes:  Pt participated in the group activity and discussion.  Pt stated that he was feeling better today.  He was able to talk with his sister and work things out.  He has reassured her that he will start working on the house when he gets his money at the beginning of February. He doesn't like when he feels his sister is mad at him.  Pt also volunteered to read aloud to the group from the worksheet which he appears to like to do.     Status After Intervention:  Improved    Participation Level: Active Listener and Interactive    Participation Quality: Appropriate, Attentive, and Sharing      Speech:  normal      Thought Process/Content: Logical      Affective Functioning: Congruent      Mood: bright      Level of consciousness:  Alert and Attentive      Response to Learning: Able to verbalize current knowledge/experience, Able to verbalize/acknowledge new learning, Able to retain information, Capable of insight, Able to change behavior, and

## 2024-01-30 NOTE — BH NOTE
Group Therapy Note    Date: 1/30/2024    Group Start Time: 10:00 AM  Group End Time: 10:50 AM  Group Topic: Psychotherapy    [facility] BEHAVIORAL TH OP    Larissa Gray, LCSW        Group Therapy Note    Attendees: 10 scheduled    Focus of session was based on the book “The Four Agreements” by Iain Milton.  We spoke about the agreements which are: Don't Take Anything Personally, Be Impeccable with Your Word, Don't Make Assumptions, and Always Do Your Best.  We spoke about how developing these ideas as daily habits can impact daily life and mood and well-being.  Patient worked on identifying which agreement would be most beneficial for them to develop as a daily habit.     Behavioral Health Daily Check In form revealed that patient is not experiencing SI/HI thoughts or plans, remaining abstinent from drugs and alcohol, and report's goal today of \"coming here. This is helping me to come here.\"       Patient's Goal:  1. 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  Pt will identify choices made over past several days and how they impacted him emotionally and cognitively.”     Notes:  Nato participated well in group. He spoke about how he spoke with his sister again \"and she was a lot nicer to me.\"  He spoke with some awareness that he can benefit from not taking things personally and work to slow down his reactions when he is feeling hurt or angry.     Status After Intervention:  Improved    Participation Level: Active Listener and Interactive    Participation Quality: Appropriate, Attentive, Sharing, and Supportive      Speech:  normal      Thought Process/Content: Logical      Affective Functioning: Congruent      Mood: euthymic      Level of consciousness:  Alert, Oriented x4, Attentive, and Preoccupied      Response to Learning: Able to verbalize current knowledge/experience, Capable of insight, and Able to change behavior      Endings: None Reported    Modes of

## 2024-02-02 ENCOUNTER — HOSPITAL ENCOUNTER (OUTPATIENT)
Facility: HOSPITAL | Age: 64
Setting detail: RECURRING SERIES
Discharge: HOME | End: 2024-02-05
Payer: COMMERCIAL

## 2024-02-02 PROCEDURE — 90853 GROUP PSYCHOTHERAPY: CPT

## 2024-02-05 NOTE — BH NOTE
Group Therapy Note    Date: 2/2/2024    Group Start Time: 10:00 AM  Group End Time: 10:50 AM  Group Topic: Psychotherapy    [facility] BEHAVIORAL TH OP    Larissa Gray, LCSW        Group Therapy Note    Attendees: 9 scheduled  Focus of session was based on ideas for how to solve problems.  We spoke about the definition of a problem and the common features in problems are that they all have goals and barriers.  We spoke about questions that can be asked first such as: is the problem real or perceived, is this problem really an opportunity, and does this problem need solving?  We also discussed helpful strategies for breaking down a goal and looking for solutions and deciding.  We worked on current problems in patients' life and how to move forward towards a solution.     Behavioral Health Daily Check In form revealed that patient is not experiencing SI/HI thoughts or plans, remaining abstinent from drugs and alcohol, and report's goal today of  \"to get better.\"       Patient's Goal:  1. Um F 31.81  K.  “Pt will identify what his tentative plans for discharge from program look like and what kind of services he can see will be beneficial to him.”     Notes:  Nato participated in group with prompting.  He spoke about how he has been hurting still in his back and leg now. He is happy he has a new doctor and feels like that has solved a lot of issues.  He spoke about how he plans to get his supplies today from [employer] to help him make progress on fixing his house. He is expressing more confidence.      Status After Intervention:  Unchanged    Participation Level: Active Listener and Interactive    Participation Quality: Appropriate, Attentive, Sharing, and Supportive      Speech:  normal      Thought Process/Content: Logical  Linear      Affective Functioning: Congruent      Mood: anxious      Level of consciousness:  Alert, Oriented x4, and 
                                                                      Group Therapy Note    Date: 2/2/2024    Group Start Time: 11:00 AM  Group End Time: 11:50 AM  Group Topic: Psychotherapy    [facility] BEHAVIORAL TH OP    Larissa Gray, LCSW        Group Therapy Note    Attendees: 9 scheduled    Focus of session was on developing self monitoring skills to help prevent relapses and develop skills to be better able to manage circumstances and triggers to work through periods of depression, anxiety, or ang.  We spoke about why it is important to manage and monitor symptoms as well as circumstances.  We spoke monitoring mood and were we feeling low or high so we can see if we can notice any mood fluctuations and what they may be about.  We spoke about symptom monitoring as well and when we need to take some action and/or get some intervention.  We spoke about how to plan for early intervention and be specific about what to do when symptoms are present.  We spoke about what we can do to develop specific plans of action for relapse prevention and how others around us can help.       Patient's Goal:  1. Um F 31.81  N.  Pt will work on identifying if current emotional state is based on external or internal factors to help her identify an effective coping strategy    Notes:  Nato participated in group with prompting.  He spoke about how he is trying to be more aware of how he is feeling and working to recognize what sets off his emotions, especially anger.  He spoke about how he is doing with it and he shared how he can see now that giving himself some time helps him slow down and lessen his emotions.     Status After Intervention:  Improved    Participation Level: Active Listener and Interactive    Participation Quality: Appropriate, Attentive, Sharing, and Supportive      Speech:  normal      Thought Process/Content: Logical  Linear      Affective Functioning: Congruent      Mood: anxious and depressed      Level of 
empowerment to help increase confidence for discharge.”  By 2/25/2024   PROBLEM  NUMBER: 1 PROBLEM: Unstable Mood   STG Goal Status Comments (Progress or Lack of Progress)       N C We will continue to work with pt on understanding his emotions and what triggers them for him.  “Pt will work on identifying if current emotional state is based on external or internal factors to help her identify an effective coping strategy.” By 2/26/2024.   O N   “Pt will verbalize acceptance of himself and his abilities and work to build on new strengths he can develop.”  By 2/29/2024       P     N   “Pt will work on building ability to seek approval from within imrself and not from the opinions or statements of others and show a decrease in anxiety as a result.”  By 2/27/2024.     Q N “Pt will express the belief that even with his mental health diagnosis, he can learn to thrive in his life and work towards new goals and accomplishments that increase his self esteem.”  By 2/29/2024.       []- Check ONLY if Problem/Goal Progress Comments  Continued on Another Page

## 2024-02-06 ENCOUNTER — HOSPITAL ENCOUNTER (OUTPATIENT)
Facility: HOSPITAL | Age: 64
Setting detail: RECURRING SERIES
Discharge: HOME | End: 2024-02-09
Payer: COMMERCIAL

## 2024-02-06 PROCEDURE — 90853 GROUP PSYCHOTHERAPY: CPT

## 2024-02-06 NOTE — BH NOTE
Group Therapy Note    Date: 2/6/2024    Group Start Time: 11:00 AM  Group End Time: 11:50 AM  Group Topic: Psychotherapy    [facility] BEHAVIORAL TH OP    Shameka Murphy LCSW        Group Therapy Note    Attendees: 10 scheduled    Focus of session was on article titled “Developing a Positive Mental Attitude.”   We spoke about what needs to happen in order to develop a “PMA” and that includes changing the language to be more empowering and how simple changes to the words we use can impact our attitude.  We spoke about the skill of shifting your physiology and how we can focus on breathing and tension in the body and how our body's physiology changes based on what emotions that we are feeling.  We spoke about making self to self comparisons and how we can help ourselves more when we focus on developing a stronger self.  We discussed cultivating present moment awareness and asking effective questions such as “what's great about this or what opportunities exist here?”  We spoke about what skills group members can develop       Patient's Goal:  1UM F31.81 E “Pt will use stop, look, listen, think, and plan before acting once a day and identify healthy responses.”    Notes:  Pt listened to the group activity and discussion.  He was alert and cooperative but remained quiet for most of the session. He did state that he thinks the shot helped his back some but he is still experiencing pain and trouble with sleeping.        Status After Intervention:  Unchanged    Participation Level: Minimal    Participation Quality: Attentive      Speech:  normal      Thought Process/Content: Logical      Affective Functioning: Flat      Mood: euthymic      Level of consciousness:  Attentive      Response to Learning: Able to verbalize current knowledge/experience, Able to verbalize/acknowledge new learning, and Able to retain information      Endings: None

## 2024-02-06 NOTE — BH NOTE
Group Therapy Note    Date: 2/6/2024    Group Start Time: 10:00 AM  Group End Time: 10:50 AM  Group Topic: Psychotherapy    [facility] BEHAVIORAL TH OP    Larissa Gray, LCSW        Group Therapy Note    Attendees: 10 scheduled    Focus of session was a review of the weekend and helping group members see their wins that they can celebrate from the past several days.  We spoke about the things that occurred and choices that they had in front of them and helping everyone to see the positive impact that they had on their well being.  We also spoke about what may have been the setbacks and how they coped and what they can plan to do for the future to help build on relapse prevention.     Behavioral Health Daily Check In form revealed that patient is not experiencing SI/HI thoughts or plans, remaining abstinent from drugs and alcohol, and report's goal today of  \"to get help.\"      Patient's Goal:  1. UM F 31.81  I.  “Pt will share times in which he has overcome difficult emotional moments and the skills he can see that he used.    Notes:  Nato was quiet in group and verbalized his pain is bad in his back and his depression is worse. He struggled with interacting but he continues to benefit from being able to be around others and interact.      Status After Intervention:  Unchanged    Participation Level: Active Listener, Interactive, and Minimal    Participation Quality: Attentive, Sharing, and Supportive      Speech:  normal      Thought Process/Content: Logical  Linear      Affective Functioning: Congruent      Mood: anxious and depressed      Level of consciousness:  Alert, Oriented x4, and Attentive      Response to Learning: Able to verbalize current knowledge/experience, Able to retain information, and Capable of insight      Endings: None Reported    Modes of Intervention: Education, Support, Socialization, Exploration, and

## 2024-02-06 NOTE — BH NOTE
Group Therapy Note    Date: 2/6/2024    Group Start Time: 12:00 PM  Group End Time: 12:50 PM  Group Topic: Psychotherapy    [facility] BEHAVIORAL TH OP    Larissa Gray LCSW        Group Therapy Note    Attendees:  11 scheduled    Focus of session was based on handout from [employer] about exposure therapy and how to implement practicing exposure to what we fear to help manage anxiety symptoms.  We spoke about why exposing ourselves gradually can be effective and the importance of processing exactly what we are afraid of, what do I fear will happen, as well as how likely our worst fears are to happen.  We spoke about what can be learned and worked to process patients fears and anxieties that are present right now and how they can work to implement this process.         Patient's Goal:  1. UM F 31.81  M.  “Pt will identify what he can do to build up his self esteem, supportive relationships, and his personal empowerment to help increase confidence for discharge    Notes:  Nato participated in group with prompting.  He continued to be quiet and did not engage in topic but he was listening and attentive.      Status After Intervention:  Unchanged    Participation Level: Active Listener and Interactive    Participation Quality: Appropriate, Attentive, Sharing, and Supportive      Speech:  normal      Thought Process/Content: Logical  Linear      Affective Functioning: Congruent      Mood: anxious and depressed      Level of consciousness:  Alert, Oriented x4, and Attentive      Response to Learning: Able to verbalize current knowledge/experience      Endings: None Reported    Modes of Intervention: Education, Support, Socialization, Exploration, and Clarifying      Discipline Responsible: /Counselor      Signature:  Larissa Gray LCSW

## 2024-02-08 ENCOUNTER — HOSPITAL ENCOUNTER (OUTPATIENT)
Facility: HOSPITAL | Age: 64
Setting detail: RECURRING SERIES
Discharge: HOME | End: 2024-02-11
Payer: COMMERCIAL

## 2024-02-08 PROCEDURE — 90853 GROUP PSYCHOTHERAPY: CPT

## 2024-02-08 NOTE — BH NOTE
Group Therapy Note    Date: 2/8/2024    Group Start Time: 11:00 AM  Group End Time: 11:50 AM  Group Topic: Psychotherapy    [facility] BEHAVIORAL TH OP    Shameka Murphy, BRETT        Group Therapy Note    Attendees: 11 scheduled    Focus of session was based on the quote of “Respect yourself enough to walk away from anything that no longer serves you, grows you, or makes you happy as well as the quote of “You will find it necessary to let things go; simply for the reason that they are heavy.”  Group members were asked to share what they need to, or who they need to walk away from for their own recovery.  We spoke about the struggles of letting go and how the thoughts or emotions tend to come back.  I spoke with group members that “letting go” does not mean we won't have to work on letting it go over and over and that it is okay to pop back in our minds and hearts.  I asked group members what or who they need to walk away from starting today.         Patient's Goal:  1UmF31.81 G Pt will identify some outlets he can pursue at home to provide some distraction or pleasure to help him manage his thoughts, moods, and feelings.”     Notes:  Pt listened to and followed along with the group activity.  He engaged when I ask him how he was doing.  He stated that he was doing alright but really didn't have anything to share today. Pt continued to cooperative and attentive    Status After Intervention:  Unchanged    Participation Level: Active Listener    Participation Quality: Appropriate and Attentive      Speech:  normal      Thought Process/Content: Logical      Affective Functioning: Flat      Mood: euthymic      Level of consciousness:  Alert and Attentive      Response to Learning: Able to verbalize current knowledge/experience, Able to retain information, and Capable of insight      Endings: None Reported    Modes of Intervention: Education, Support, 
                                                                      Group Therapy Note    Date: 2/8/2024    Group Start Time: 12:00 PM  Group End Time: 12:50 PM  Group Topic: Psychotherapy    [facility] BEHAVIORAL [facility] OP    Larissa Gray LCSW        Group Therapy Note    Attendees: 11. Scheduled    Focus of session was based on handout “Listen to Your Emotions.”  We spoke about different painful emotions that can be common and difficult to process including bitterness, resentment, anger, disappointment, guilt, shame, and anxiety.  We spoke about the ideas in the handout of what these emotions are telling you may be bothering you and how to start to work through it.  We spoke about what patient feels is an emotion that keeps coming back and they can focus on how they can work though it or what that emotion is telling them.         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  “Pt will work on identifying if current emotional state is based on external or internal factors to help her identify an effective coping strategy.    Notes:  Nato participated in group with prompting.  He continues to be quiet but he was listening and attentive.  He did say that he has been feeling more anxious as he has been focused on \"mistakes I think I have made.\"      Status After Intervention:  Unchanged    Participation Level: Active Listener and Interactive    Participation Quality: Appropriate, Attentive, Sharing, and Supportive      Speech:  Soft      Thought Process/Content: Logical  Linear      Affective Functioning: Congruent      Mood: anxious      Level of consciousness:  Oriented x4, Attentive, and Preoccupied      Response to Learning: Able to verbalize current knowledge/experience      Endings: None Reported    Modes of Intervention: Education, Support, Exploration, Clarifying, and Problem-solving      Discipline Responsible: /Counselor      Signature:  Larissa Gray LCSW    
behavior      Endings: None Reported    Modes of Intervention: Education, Support, Socialization, Exploration, and Clarifying      Discipline Responsible: /Counselor      Signature:  Larissa Gray LCSW

## 2024-02-09 ENCOUNTER — HOSPITAL ENCOUNTER (OUTPATIENT)
Facility: HOSPITAL | Age: 64
Setting detail: RECURRING SERIES
Discharge: HOME | End: 2024-02-12
Payer: COMMERCIAL

## 2024-02-09 PROCEDURE — 90853 GROUP PSYCHOTHERAPY: CPT

## 2024-02-09 NOTE — BH NOTE
Group Therapy Note    Date: 2/9/2024    Group Start Time: 11:00 AM  Group End Time: 11:50 AM  Group Topic: Psychotherapy    [facility]IOR[facility] TH [facility]    [facility]        Group [facility] Note    Attendees: 9 scheduled    Focus of session was based on ideas for how to solve problems.  We spoke about the definition of a problem and the common features in problems are that they all have goals and barriers.  We spoke about questions that can be asked first such as: is the problem real or perceived, is this problem really an opportunity, and does this problem need solving?  We also discussed helpful strategies for breaking down a goal and looking for solutions and deciding.  We worked on current problems in patients' life and how to move forward towards a solution.         Patient's Goal:  1. UM F 31.81  P.  Pt will work on building ability to seek approval from within imrself and not from the opinions or statements of others and show a decrease in anxiety as a result.”      Notes:  Nato participated in group well and he was brighter in affect. He spoke about how he knows that he does well when he can get things done. He spoke about how he also knows that he is doing better the more he can stay connected.     Status After Intervention:  Improved    Participation Level: Active Listener and Interactive    Participation Quality: Appropriate, Attentive, Sharing, and Supportive      Speech:  normal      Thought Process/Content: Logical  Linear      Affective Functioning: Congruent      Mood: anxious      Level of consciousness:  Alert, Oriented x4, and Attentive      Response to Learning: Able to verbalize current knowledge/experience, Able to retain information, Capable of insight, and Progressing to goal      Endings: None Reported    Modes of Intervention: Education, Support, Socialization, Exploration, and Clarifying      Discipline Responsible: 
verbalize current knowledge/experience and Able to retain information      Endings: None Reported    Modes of Intervention: Education, Support, and Socialization      Discipline Responsible: /Counselor      Signature:  Shameka Murphy LCSW    
verbalize/acknowledge new learning, Able to retain information, Capable of insight, Able to change behavior, and Progressing to goal      Endings: None Reported    Modes of Intervention: Education, Support, Socialization, Exploration, Clarifying, and Activity      Discipline Responsible: /Counselor      Signature:  Shameka Murphy LCSW

## 2024-02-13 ENCOUNTER — HOSPITAL ENCOUNTER (OUTPATIENT)
Facility: HOSPITAL | Age: 64
Setting detail: RECURRING SERIES
Discharge: HOME | End: 2024-02-16
Payer: COMMERCIAL

## 2024-02-13 PROCEDURE — 90853 GROUP PSYCHOTHERAPY: CPT

## 2024-02-13 NOTE — BH NOTE
Group Therapy Note    Date: 2/13/2024    Group Start Time: 12:00 PM  Group End Time: 12:50 PM  Group Topic: Psychotherapy    [facility] BEHAVIORAL TH OP    Shameka Murphy, BRETT        Group Therapy Note    Attendees: 11 scheduled    Focus of session was on reasons why it is vital to allow ourselves to feel feelings and process them.  We spoke about how we are taught young that we should avoid feelings such as anger and sadness and how we live in a culture that attempts to hide from feelings. We spoke about the benefit of “leaning into” painful feelings.  We spoke about how we numb good feelings when we learn to numb negative ones, when we struggle with emotions, this often leads to more suffering, and processing and experiencing your feelings is part of having a full life.  We spoke about identifying and processing emotions is a sign of strength and what group members are willing to do right now to develop more abilities to feel and process emotions.         Patient's Goal:  1UM F31.81 K .  “Pt will identify what his tentative plans for discharge from program look like and what kind of services he can see will be beneficial to him.”     Notes:  Pt participated in the group activity and discussion.  He shared with the group that he had been smoking pot since he was in 7th grade.  He has decided to stop and has not smoked in 4 days.  The group was supportive of his efforts.  Pt states he wants to be able to say the money he was using on pot to get a car.     Status After Intervention:  Improved    Participation Level: Active Listener and Interactive    Participation Quality: Appropriate, Attentive, Sharing, and Supportive      Speech:  normal      Thought Process/Content: Logical      Affective Functioning: Congruent      Mood: euthymic      Level of consciousness:  Alert and Attentive      Response to Learning: Able to verbalize current

## 2024-02-13 NOTE — BH NOTE
Group Therapy Note    Date: 2/13/2024    Group Start Time: 10:00 AM  Group End Time: 10:50 AM  Group Topic: Psychotherapy    [facility] BEHAVIORAL TH OP    Larissa Gray, LCSW        Group Therapy Note    Attendees:  11 scheduled    Focus of session was based on understanding all or nothing thinking and how it can be challenged and changed.  We spoke about the language that is used in this type of cognitive distortion and how to work to challenge these thoughts.  Group members were asked if they can recognize all or nothing thinking patterns that they are experiencing and how to manage those thoughts.  We spoke about the dos and don'ts of all or nothing thinking and what strategies group members can use to help challenge these distorted thoughts to help them maintain their mood and their wellbeing.     Behavioral Health Daily Check In form revealed that patient is not experiencing SI/HI thoughts or plans, remaining abstinent from drugs and alcohol, and report's goal today of  \"try to be better.\"    Patient's Goal:  1. UM F 31.81  M.  Pt will identify what he can do to build up his self esteem, supportive relationships, and his personal empowerment to help increase confidence for discharge.”      Notes:  Nato participated in group well and he spoke about how he has not smoked pot for 4 days and it has \"been really tough.\"  He spoke about how he feels \"terrible because I lied to you and Brenda about the fact that I smoke pot.\" He spoke about how he wants to give up for us but this writer encouraged him to do it for himself  He was able to say he wanted to save his money so he can get a car.      Status After Intervention:  Unchanged    Participation Level: Active Listener and Interactive    Participation Quality: Appropriate, Attentive, and Sharing      Speech:  normal      Thought Process/Content: Perseverating      Affective Functioning:

## 2024-02-13 NOTE — BH NOTE
Group Therapy Note    Date: 2/13/2024    Group Start Time: 1100 AM  Group End Time: 1150 AM  Group Topic: Psychotherapy    [facility] BEHAVIORAL TH OP    Marina, Larissa PASCUAL, LCSW        Group Therapy Note    Attendees:  11 scheduled    Focus of session was based on handout from [employer] “Grounding Techniques Menu” and when and how grounding can be a useful tool to manage anxiety.  We spoke about how grounding is effective in bringing our minds back to the present moment rather than focused on past issues or future worries.   We discussed the use of our five senses, using our body, distraction, reminders that we are safe, orienting ourselves, offer self-compassion, and using imagination in effective ways.         Patient's Goal:  1. UM F 31.81  Q.  Pt will express the belief that even with his mental health diagnosis, he can learn to thrive in his life and work towards new goals and accomplishments that increase his self esteem    Notes:  Nato participated in group with prompting.  He spoke about how he can see that he can work to be nicer to himself. He did talk about how he likes to take hot showers as \"this helps in a lot of ways. It helps with my pain and maybe it does help with my feeling anxious and irritable.\"      Status After Intervention:  Unchanged    Participation Level: Active Listener and Interactive    Participation Quality: Appropriate, Attentive, Sharing, and Supportive      Speech:  normal      Thought Process/Content: Logical  Linear      Affective Functioning: Congruent      Mood: anxious and depressed      Level of consciousness:  Alert, Oriented x4, and Attentive      Response to Learning: Able to verbalize current knowledge/experience, Able to retain information, and Capable of insight      Endings: None Reported    Modes of Intervention: Education, Support, Socialization, Exploration, and Clarifying      Discipline

## 2024-02-15 ENCOUNTER — HOSPITAL ENCOUNTER (OUTPATIENT)
Facility: HOSPITAL | Age: 64
Setting detail: RECURRING SERIES
Discharge: HOME | End: 2024-02-18
Payer: COMMERCIAL

## 2024-02-15 DIAGNOSIS — F12.21 CANNABIS USE DISORDER, SEVERE, IN EARLY REMISSION (HCC): Primary | ICD-10-CM

## 2024-02-15 PROCEDURE — 99214 OFFICE O/P EST MOD 30 MIN: CPT | Performed by: PSYCHIATRY & NEUROLOGY

## 2024-02-15 PROCEDURE — 90853 GROUP PSYCHOTHERAPY: CPT

## 2024-02-15 RX ORDER — DIVALPROEX SODIUM 500 MG/1
500 TABLET, DELAYED RELEASE ORAL 2 TIMES DAILY
Qty: 60 TABLET | Refills: 0 | Status: SHIPPED | OUTPATIENT
Start: 2024-02-15 | End: 2024-02-15

## 2024-02-15 RX ORDER — DIVALPROEX SODIUM 500 MG/1
500 TABLET, DELAYED RELEASE ORAL 2 TIMES DAILY
Qty: 180 TABLET | Refills: 3 | Status: SHIPPED | OUTPATIENT
Start: 2024-02-15

## 2024-02-15 RX ORDER — DIVALPROEX SODIUM 500 MG/1
500 TABLET, DELAYED RELEASE ORAL 2 TIMES DAILY
Qty: 60 TABLET | Refills: 0 | Status: SHIPPED | OUTPATIENT
Start: 2024-02-15 | End: 2024-02-20

## 2024-02-15 NOTE — BH NOTE
Group Therapy Note    Date: 2/15/2024    Group Start Time: 11:00 AM  Group End Time: 11:50 AM  Group Topic: Psychotherapy    [facility] BEHAVIORAL TH OP    Shameka Murphy, ZAIDAW        Group Therapy Note    Attendees: 10 scheduled      Focus of session was on procrastination and the possible reasons behind why we do it.  We spoke about fear and lack of confidence and esteem can play a major role in our reasons for doing this and we spoke about how we can also let being stubborn get in our way and that affects our ability to move forward.  We spoke about how we can be contributing the reason to not do these things by how we talk to ourselves and that we tell ourselves that we are not strong enough and we are not capable.  We addressed effective ways to avoid procrastination and that we need to refute our arguments aggressively as to why we are putting things off.  We spoke about addressing specifically our reason for delay and developing fair arguments against delay and work to move forward.        Patient's Goal:  1Um F31.81 N .  “Pt will work on identifying if current emotional state is based on external or internal factors to help her identify an effective coping strategy.”     Notes:  Pt participated in the group discussion and activity.He shared with the group that he was feeling very angry this morning but is feeling better now. We discussed that he was able to express and acknowledge his feelings first and this helped him  to calm down. He asked for assistance and was able to process his feelings and funnel his anger.    Status After Intervention:  Improved    Participation Level: Active Listener and Interactive    Participation Quality: Appropriate, Attentive, Sharing, and Supportive      Speech:  normal      Thought Process/Content: Logical      Affective Functioning: Congruent      Mood: euthymic      Level of consciousness:  Alert and

## 2024-02-15 NOTE — PROGRESS NOTES
tablet by mouth daily    traZODone (DESYREL) 100 MG tablet Take 1 tablet by mouth nightly    acetaminophen (TYLENOL) 500 MG tablet Take 2 tablets by mouth 3 times daily    sucralfate (CARAFATE) 1 GM tablet     omeprazole (PRILOSEC) 40 MG delayed release capsule Take 1 capsule by mouth 2 times daily     No current facility-administered medications for this encounter.     MENTAL STATUS UPDATE: (Positives in Bold)  Appearance:   Neat   Disheveled  Odiferous   Appropriate  Orientation:   Time  Place  Person  Speech:   Coherent  Pressured  Garbled/Slurred  Loud   Soft  Mute  Memory:   Intact  Impaired (Recent  Remote)--Mild  Severe  Mood:   Euthymic  Depressed--very mild  Anxious  Elated  Angry  Affect:    Appropriate  Inappropriate  Labile  Blunted  Flat--slight  Thought Content:   Logical/Appropriate  Paranoid  Delusional  Illogical IOR  SI  HI  Thought Process:   Coherent/linear  Tangential  Loose/Disorganized   Hallucinations:   None  Auditory  Visual  Tactile  Olfactory  Gustatory  Insight:   Good  Fair  Impaired      Judgment:   Good  Fair  Impaired     CONTINUED NEED FOR TREATMENT: (Positives in Bold)  Continued psychiatric symptoms causing impairment in IDL  or functioning  Continued non-compliance with meds resulting in decompensation  High level of debilitation and symptoms with inadequate support  Continued need for structured care to adjust medications  Continued presence of debilitating symptoms  Continued presence of risk factors     CONTINUED NEED FOR GROUP PSYCHOTHERAPY TO ADDRESS THE FOLLOWING: (Positives in Bold)  Psychiatric symptom management       Psychiatric relapse prevention    Anxiety management      Self-esteem issues      Poor decision making       Recent decompensation       Safety issues      Anger management     Self care/ADL's     Med/treatment compliance      Impaired boundaries/relationships     Assertiveness      Grief/loss resolution       Guilt/shame issues     Realistic goal setting

## 2024-02-15 NOTE — BH NOTE
Group Therapy Note    Date: 2/15/2024    Group Start Time: 12:00 PM  Group End Time: 12:50 PM  Group Topic: Psychotherapy    [facility] BEHAVIORAL TH OP    Larissa Gray, LCSW        Group Therapy Note    Attendees:  10 scheduled    Focus of session was based on information about emotional maturity and some tips about how to become more aware of emotions and to take responsibility for them.  We spoke about how unhealthy/quick fix habits, regardless of what they are, if engaged in a daily or very consistent basis, will lead to numbing and avoidance of emotions and lead to emotional immaturity.  We spoke about how the process of breaking these habits can lead to intense feelings that patient may be overwhelmed by.  We spoke about the strategies to start to manage emotions are to: be aware of how you are feeling, stop and think, know yourself, and practice expressing your feelings in some effective way.         Patient's Goal:  1. UM F 31.81  E.  Pt will use stop, look, listen, think, and plan before acting once a day and identify healthy responses.”    Notes:  Nato participated in group with prompting.  He spoke about how he knows that he is working through some emotional pain right now as he has given up smoking pot. He spoke about how he is going to keep on talking about how he is feeling and find ways to manage his emotions. He is willing to try and journal to get some of his emotions out and be able to process them.       Status After Intervention:  Unchanged    Participation Level: Active Listener and Interactive    Participation Quality: Appropriate, Attentive, Sharing, and Supportive      Speech:  normal      Thought Process/Content: Logical  Linear      Affective Functioning: Congruent      Mood: anxious and elevated      Level of consciousness:  Alert, Oriented x4, Attentive, and Preoccupied      Response to Learning: Able to verbalize

## 2024-02-15 NOTE — BH NOTE
Group Therapy Note    Date: 2/15/2024    Group Start Time: 10:00 AM  Group End Time: 10:50 AM  Group Topic: Psychotherapy    [facility] BEHAVIORAL TH OP    Larissa Gray, LCSW        Group Therapy Note    Attendees: 10 scheduled      Focus of session was based on handout from [employer] “Processing Big Changes.”  We spoke about struggles with changes, both big and small, and what our usual thought processes and emotions are that come along with changes.  We spoke about the tips for how to process change includes: focus on what you can control, write out feelings on paper, keep up your self-care, find support, tune into the good, make plans, and think of your strength. We spoke about big changes that are coming up or currently being experienced and what tips can be utilized to help build up confidence to be able to manage changes in life.       Behavioral Health Daily Check In form revealed that patient is not experiencing SI/HI thoughts or plans, remaining abstinent from drugs and alcohol, and report's goal today of  \"I feel terrible today.\"    Patient's Goal:  1. UM F 31.81  P.  “Pt will work on building ability to seek approval from within imrself and not from the opinions or statements of others and show a decrease in anxiety as a result    Notes:  Nato had called this morning, upset and angry about many different issues. He stated it is day 6 of not smoking pot after daily use of it since he was 15.  He said he felt terrible and he knew that he was angry and upset.  He was upset about getting into the back of the van but he was able to let this writer give him some feedback and direction.  He stated he was glad he came to group but is struggling with this change in his behaviors.      Status After Intervention:  Decompensated    Participation Level: Active Listener and Interactive    Participation Quality: Appropriate, Attentive,

## 2024-02-16 ENCOUNTER — HOSPITAL ENCOUNTER (OUTPATIENT)
Facility: HOSPITAL | Age: 64
Setting detail: RECURRING SERIES
Discharge: HOME | End: 2024-02-19
Payer: COMMERCIAL

## 2024-02-16 PROCEDURE — 90853 GROUP PSYCHOTHERAPY: CPT

## 2024-02-16 NOTE — BH NOTE
Group Therapy Note    Date: 2/16/2024    Group Start Time: 11:00 AM  Group End Time: 11:50 AM  Group Topic: Psychotherapy    [facility] BEHAVIORAL TH OP    Shameka Murphy, BRETT        Group Therapy Note    Attendees: 8 Scheduled    Focus of session was on procrastination and the possible reasons behind why we do it.  We spoke about fear and lack of confidence and esteem can play a major role in our reasons for doing this and we spoke about how we can also let being stubborn get in our way and that affects our ability to move forward.  We spoke about how we can be contributing the reason to not do these things by how we talk to ourselves and that we tell ourselves that we are not strong enough and we are not capable.  We addressed effective ways to avoid procrastination and that we need to refute our arguments aggressively as to why we are putting things off.  We spoke about addressing specifically our reason for delay and developing fair arguments against delay and work to move forward.        Patient's Goal:  1UM F31.81 G . “Pt will identify some outlets he can pursue at home to provide some distraction or pleasure to help him manage his thoughts, moods, and feelings.”     Notes:  Pt listened to the group activity and discussion.  He observed the group but remained quiet for most of the group.  After group he stated that he was having a hard time with quitting smoking pot but is still trying. He wanted us to know also that he got his medication and taken one of his pills and was feeling calmer     Status After Intervention:  Improved    Participation Level: Active Listener    Participation Quality: Appropriate, Attentive, and Sharing      Speech:  normal      Thought Process/Content: Logical      Affective Functioning: Congruent      Mood: euthymic      Level of consciousness:  Attentive      Response to Learning: Able to verbalize current 
                                                                      Group Therapy Note    Date: 2/16/2024    Group Start Time: 12:00 PM  Group End Time: 12:50 PM  Group Topic: Psychotherapy    [facility] BEHAVIORAL TH OP    Larissa Gray, LCSW        Group Therapy Note    Attendees:  8 scheduled  Focus of session was on information about being a highly sensitive person (HSP).  We discussed how a HSP reacts to minor stressors and triggers to the same degree they would react in a very dangerous situation.   I shared how if the amygdala in the brain detects a threat of any kind, the hormones are released and we are feeling it both physically and mentally.  We spoke about how the HSP will add negative thoughts about self and our abilities and thoughts about the past and the future in there as well.  I shared 4 steps from the [employer] Review of how to manage episodes of stress and they include staying in the moment, practice being a witness, tuning into the body, and focusing on the breath.  We spoke about regulating our breathing can help us return our bodies and mind to a pre triggered state.       Patient's Goal:  1. UM F 31.81  O.  Pt will verbalize acceptance of himself and his abilities and work to build on new strengths he can develop    Notes:  Nato participated in group with prompting.  He spoke about how he is going to try and manage his emotions this weekend and we spoke about how he can get easily overwhelmed right now.  He spoke about how he knows he will benefit from looking at his surroundings and being able to understand if he is feeling overwhelmed by noises or intensity of the situation.  He spoke about how he will work to take his medications later in the day.      Status After Intervention:  Unchanged    Participation Level: Active Listener and Interactive    Participation Quality: Appropriate, Attentive, Sharing, and Supportive      Speech:  normal      Thought Process/Content: 
Listener and Interactive    Participation Quality: Appropriate, Attentive, Sharing, and Supportive      Speech:  normal      Thought Process/Content: Perseverating      Affective Functioning: Congruent and Flat      Mood: anxious and depressed      Level of consciousness:  Alert, Oriented x4, Attentive, and Preoccupied      Response to Learning: Able to verbalize current knowledge/experience, Able to change behavior, and Progressing to goal      Endings: None Reported    Modes of Intervention: Education, Support, Socialization, Exploration, Clarifying, and Problem-solving      Discipline Responsible: /Counselor      Signature:  Larissa Gray LCSW

## 2024-02-19 ENCOUNTER — HOSPITAL ENCOUNTER (OUTPATIENT)
Facility: HOSPITAL | Age: 64
Setting detail: RECURRING SERIES
Discharge: HOME | End: 2024-02-22
Payer: COMMERCIAL

## 2024-02-19 PROCEDURE — 90853 GROUP PSYCHOTHERAPY: CPT

## 2024-02-19 NOTE — BH NOTE
Group Therapy Note    Date: 2/19/2024    Group Start Time: 10:00 AM  Group End Time: 10:50 AM  Group Topic: Psychotherapy    [facility] BEHAVIORAL TH OP    Shameka Murphy LCSW        Group Therapy Note    Attendees: 8 scheduled    Focus of session was a review of the weekend and successes that can be identified in recognizing and managing triggers to negative feeling states.  We spoke about what was done in managing triggers that led to success and what was done or not done that led to struggles in maintaining and getting through difficult moments.  We also spoke about goals to work towards this week and what is reasonable to accomplish by the end of the week.       Behavioral Health Daily Check In form revealed that pt is not experiencing SI/HI thoughts or plans, remaining abstinent from drugs and alcohol, and reports goal today working on house  Patient's Goal:  1UM F31.81  I .  “Pt will share times in which he has overcome difficult emotional moments and the skills he can see that he used.”      Notes:  Pt participated in the group discussion. He shared that he was able to work on his bathroom this weekend  and it looks better now.  He also fixed the ceiling.  He stated it felt good to accomplish something.     Status After Intervention:  Improved    Participation Level: Active Listener and Interactive    Participation Quality: Appropriate, Attentive, and Sharing      Speech:  normal      Thought Process/Content: Logical      Affective Functioning: Congruent      Mood: euthymic      Level of consciousness:  Alert and Attentive      Response to Learning: Able to verbalize current knowledge/experience, Able to verbalize/acknowledge new learning, Able to retain information, Capable of insight, and Able to change behavior      Endings: None Reported    Modes of Intervention: Education, Support, Socialization, Exploration, Clarifying, and 
                                                                      Group Therapy Note    Date: 2/19/2024    Group Start Time: 12:00 PM  Group End Time: 12:50 PM  Group Topic: Psychotherapy    [facility] BEHAVIORAL TH OP    Shameka Murphy, BRETT        Group Therapy Note    Attendees: 8 Focus of session was based on “How to Love Yourself” from Sherrie [employer].  We spoke about the different strategies and they  include; Stop all Criticism, Don't Scare Yourself, Be Gentle and Kind and Patient, Be Kind to Your Mind, Praise Yourself, Support Yourself, Be [place] to your Negative, Take Care of Your Body, Mirror work, and Love Yourself and Do it Now.”  We spoke about what strategies we can use to help build up this important skill and how to have self love.        Patient's Goal:  1UM F31.81 E “Pt will use stop, look, listen, think, and plan before acting once a day and identify healthy responses.”    Notes:  Pt participated in the group activity and discussion.  He stated he could relate to \"Be kind to your mind \" as stated on the worksheet.  He does not like the thoughts that he has or how he expressers them at times.  He would like to continue to work toward gently changing his thought and be more positive.     Status After Intervention:  Improved    Participation Level: Active Listener and Interactive    Participation Quality: Appropriate, Attentive, and Sharing      Speech:  normal      Thought Process/Content: Logical      Affective Functioning: Congruent      Mood: euthymic      Level of consciousness:  Alert and Attentive      Response to Learning: Able to verbalize current knowledge/experience, Able to verbalize/acknowledge new learning, Able to retain information, Capable of insight, Able to change behavior, and Progressing to goal      Endings: None Reported    Modes of Intervention: Education, Support, Socialization, Exploration, Clarifying, Problem-solving, and Activity      Discipline Responsible: Social 
Clarifying      Discipline Responsible: /Counselor      Signature:  Larissa Gray LCSW

## 2024-02-20 ENCOUNTER — OFFICE VISIT (OUTPATIENT)
Age: 64
End: 2024-02-20
Payer: COMMERCIAL

## 2024-02-20 VITALS
WEIGHT: 160 LBS | RESPIRATION RATE: 18 BRPM | TEMPERATURE: 97.6 F | SYSTOLIC BLOOD PRESSURE: 112 MMHG | HEIGHT: 66 IN | DIASTOLIC BLOOD PRESSURE: 80 MMHG | HEART RATE: 70 BPM | BODY MASS INDEX: 25.71 KG/M2

## 2024-02-20 DIAGNOSIS — M54.42 CHRONIC LEFT-SIDED LOW BACK PAIN WITH LEFT-SIDED SCIATICA: Primary | ICD-10-CM

## 2024-02-20 DIAGNOSIS — Z91.81 HX OF FALLING: ICD-10-CM

## 2024-02-20 DIAGNOSIS — G89.29 CHRONIC LEFT-SIDED LOW BACK PAIN WITH LEFT-SIDED SCIATICA: Primary | ICD-10-CM

## 2024-02-20 PROCEDURE — 3079F DIAST BP 80-89 MM HG: CPT | Performed by: NURSE PRACTITIONER

## 2024-02-20 PROCEDURE — 3074F SYST BP LT 130 MM HG: CPT | Performed by: NURSE PRACTITIONER

## 2024-02-20 PROCEDURE — 99214 OFFICE O/P EST MOD 30 MIN: CPT | Performed by: NURSE PRACTITIONER

## 2024-02-20 ASSESSMENT — PATIENT HEALTH QUESTIONNAIRE - PHQ9
SUM OF ALL RESPONSES TO PHQ QUESTIONS 1-9: 0
5. POOR APPETITE OR OVEREATING: 0
SUM OF ALL RESPONSES TO PHQ QUESTIONS 1-9: 0
SUM OF ALL RESPONSES TO PHQ9 QUESTIONS 1 & 2: 0
10. IF YOU CHECKED OFF ANY PROBLEMS, HOW DIFFICULT HAVE THESE PROBLEMS MADE IT FOR YOU TO DO YOUR WORK, TAKE CARE OF THINGS AT HOME, OR GET ALONG WITH OTHER PEOPLE: 0
8. MOVING OR SPEAKING SO SLOWLY THAT OTHER PEOPLE COULD HAVE NOTICED. OR THE OPPOSITE, BEING SO FIGETY OR RESTLESS THAT YOU HAVE BEEN MOVING AROUND A LOT MORE THAN USUAL: 0
6. FEELING BAD ABOUT YOURSELF - OR THAT YOU ARE A FAILURE OR HAVE LET YOURSELF OR YOUR FAMILY DOWN: 0
2. FEELING DOWN, DEPRESSED OR HOPELESS: 0
1. LITTLE INTEREST OR PLEASURE IN DOING THINGS: 0
7. TROUBLE CONCENTRATING ON THINGS, SUCH AS READING THE NEWSPAPER OR WATCHING TELEVISION: 0
9. THOUGHTS THAT YOU WOULD BE BETTER OFF DEAD, OR OF HURTING YOURSELF: 0
SUM OF ALL RESPONSES TO PHQ QUESTIONS 1-9: 0
SUM OF ALL RESPONSES TO PHQ QUESTIONS 1-9: 0
4. FEELING TIRED OR HAVING LITTLE ENERGY: 0
3. TROUBLE FALLING OR STAYING ASLEEP: 0

## 2024-02-20 NOTE — PROGRESS NOTES
Chief Complaint   Patient presents with    Hip Pain     Left  , followup       Vitals:    02/20/24 0729   BP: 112/80   Pulse: 70   Resp: 18   Temp: 97.6 °F (36.4 °C)

## 2024-02-21 ENCOUNTER — HOSPITAL ENCOUNTER (OUTPATIENT)
Facility: HOSPITAL | Age: 64
Setting detail: RECURRING SERIES
Discharge: HOME | End: 2024-02-24
Payer: COMMERCIAL

## 2024-02-21 ENCOUNTER — HOSPITAL ENCOUNTER (OUTPATIENT)
Facility: HOSPITAL | Age: 64
Discharge: HOME | End: 2024-02-24
Payer: COMMERCIAL

## 2024-02-21 DIAGNOSIS — G89.29 CHRONIC LEFT-SIDED LOW BACK PAIN WITH LEFT-SIDED SCIATICA: ICD-10-CM

## 2024-02-21 DIAGNOSIS — M54.42 CHRONIC LEFT-SIDED LOW BACK PAIN WITH LEFT-SIDED SCIATICA: ICD-10-CM

## 2024-02-21 PROCEDURE — 90853 GROUP PSYCHOTHERAPY: CPT

## 2024-02-21 PROCEDURE — 72100 X-RAY EXAM L-S SPINE 2/3 VWS: CPT

## 2024-02-21 NOTE — GROUP NOTE
Group Therapy Note    Date: 2024    Group Start Time: 12:00 PM  Group End Time: 12:50 PM  Group Topic: Psychotherapy    [facility] BEHAVIORAL [facility] OP    Shameka Murphy LCSW        Group Therapy Note    Attendees: 8     Focus of session was based on article “Six Principles for Managing Impulses to Maximize Life Satisfaction and Success.”  We reviewed the definition of an impulse which is “an impelling force or a sudden wish or urge that prompts an unpremeditated act or feeling.”  We spoke about the definition of being impulsive which is characterized by actions based on sudden desires, whims, or inclinations rather than careful thought.”  We spoke about a method to try and control impulses and it is PURRRRS which stands for Pause, Use, Reflect, Reason, Respond, Revise, and Stabilize.  We spoke about what our impulsive behavior is and how going through these steps may help us to slow down and how may that impact the emotional consequences to be more positive rather than negative.  I shared the six principles from the article which included: know your risks, plan for your risks, count to 10… a lot, Be mindful, get corrective feedback, and remember that Paul was not built in a day.           Patient's Goal:  ***    Notes:  ***    Status After Intervention:  {Status After Intervention:475605334}    Participation Level: {Participation Level:247567559}    Participation Quality: {[facility] PARTICIPATION QUALITY:298851046}      Speech:  {ED  CD_SPEECH:01051}      Thought Process/Content: {Thought Process/Content:253400747}      Affective Functioning: {Affective Functionin}      Mood: {Mood:641150416}      Level of consciousness:  {Level of consciousness:723642224}      Response to Learning: {[facility] Responses to Learnin}      Endings: {[facility] Endings:18692}    Modes of Intervention: {[facility] Modes of

## 2024-02-21 NOTE — BH NOTE
Group Therapy Note    Date: 2/21/2024    Group Start Time: 12:00 PM  Group End Time: 12:50 PM  Group Topic: Psychotherapy    [facility] BEHAVIORAL [facility] OP    Shameka Murphy LCSW        Group Therapy Note    Attendees: 10 scheduled    Focus of session was based on article “Six Principles for Managing Impulses to Maximize Life Satisfaction and Success.”  We reviewed the definition of an impulse which is “an impelling force or a sudden wish or urge that prompts an unpremeditated act or feeling.”  We spoke about the definition of being impulsive which is characterized by actions based on sudden desires, whims, or inclinations rather than careful thought.”  We spoke about a method to try and control impulses and it is PURRRRS which stands for Pause, Use, Reflect, Reason, Respond, Revise, and Stabilize.  We spoke about what our impulsive behavior is and how going through these steps may help us to slow down and how may that impact the emotional consequences to be more positive rather than negative.  I shared the six principles from the article which included: know your risks, plan for your risks, count to 10… a lot, Be mindful, get corrective feedback, and remember that Paul was not built in a day.       Behavioral Health Daily Check In form revealed that pt is not experiencing SI/HI thoughts or plans, remaining abstinent from drugs and alcohol, and reports goal today to get help     Patient's Goal:  1UM F31.81 K .  “Pt will identify what his tentative plans for discharge from program look like and what kind of services he can see will be beneficial to him.”     Notes:  Pt shared that he is feeling somewhat better.  He had a referral from his new doctor for an xray on his back and was able to get that done.  He stated that he knows his anger has been bad lately and he is trying to do better and work through it. The group was supportive and discussed

## 2024-02-21 NOTE — PROGRESS NOTES
Subjective:      Nato Small is a 63 y.o. male who presents for follow up of low back problems. Current symptoms include: pain in lower back  (aching, sharp, shooting, and throbbing in character; 10/10 in severity). Symptoms have worsened from the previous visit. Exacerbating factors identified by the patient are bending sideways and standing.    Hx of falls: Nato’s sister has noted concern  via My Chart  He has had at least 3 instances of striking his head and loosing consciousness.  Most recent occurred about a year ago.   His memory is very poor.  She request a CT of the brain.     Frequent falls  Past Medical History:   Diagnosis Date    GERD (gastroesophageal reflux disease)     Hx of ulcer disease     Hypertension     Sciatic pain     Violent behavior     recenlty - reported by his sister (been seeing neurologist)     Patient Active Problem List    Diagnosis Date Noted    Cannabis use disorder, severe, in early remission ([facility]) 02/15/2024    Alcohol dependence in remission ([facility]) 09/19/2023    Alcohol dependence with alcohol-induced persisting dementia ([facility]) 09/19/2023    Bipolar II disorder, most recent episode hypomanic ([facility]) 09/19/2023    Learning disabilities 09/19/2023    Hypertension 12/07/2022    Rash 12/07/2022     Past Surgical History:   Procedure Laterality Date    COLONOSCOPY N/A 2/7/2023    COLONOSCOPY performed by Ken Easton MD at [facility]    COLONOSCOPY N/A 8/23/2023    COLONOSCOPY POLYPECTOMY SNARE/COLD BIOPSY performed by Ken Easton MD at [facility] END[facility]    HERNIA REPAIR      ORTHOPEDIC SURGERY Left     rotator cuff repair- shoulder    UPPER GASTROINTESTINAL ENDOSCOPY N/A 8/23/2023    EGD BIOPSY performed by Ken Easton MD at [facility] END[facility]     Family History   Problem Relation Age of Onset    Ovarian Cancer Mother     Heart Attack Father     Cancer Brother     Colon Cancer Brother     Diabetes Mother     Cancer Mother      Social History     Socioeconomic

## 2024-02-21 NOTE — BH NOTE
Group Therapy Note    Date: 2/21/2024    Group Start Time: 11:00 AM  Group End Time: 11:50 AM  Group Topic: Psychotherapy    [facility] BEHAVIORAL TH OP    Marina, Larissa S, LCSW        Group Therapy Note    Attendees: 10 scheduled    Focus of session was based on handouts from [employer]'s “Understanding and Reducing Angry Feelings” workbook.  We spoke about understanding the roots of anger such as fear, including fear of losing face or being embarrassed, or pain, being hurt by words and attitudes.  We spoke about anger triggers in regard to family, work, friends, and with strangers in regard to pain and fear.  We also spoke about tips for managing anger including asking self if anger is justified, talk rather than act out angry feelings.         Patient's Goal:  1. UM F 31.81  G.  “Pt will identify some outlets he can pursue at home to provide some distraction or pleasure to help him manage his thoughts, moods, and feelings.”     Notes:  Nato participated in group with prompting.  He spoke about how \"I know that I have anger issues but I am doing better right now. I cannot believe how upset I got once I stopped smoking pot.\"  We spoke about how he can work to build up new skills and recognize how vulnerable he may be to setbacks due to how he is feeling and even due to how much sleep he has gotten, as this continues to be a major factor for him.      Status After Intervention:  Unchanged    Participation Level: Active Listener and Interactive    Participation Quality: Appropriate, Attentive, Sharing, and Supportive      Speech:  normal      Thought Process/Content: Logical  Linear      Affective Functioning: Congruent      Mood: anxious      Level of consciousness:  Alert, Oriented x4, and Attentive      Response to Learning: Able to verbalize current knowledge/experience, Able to retain information, and Capable of insight      Endings: None

## 2024-02-23 ENCOUNTER — HOSPITAL ENCOUNTER (OUTPATIENT)
Facility: HOSPITAL | Age: 64
Setting detail: RECURRING SERIES
Discharge: HOME | End: 2024-02-26
Payer: COMMERCIAL

## 2024-02-23 PROCEDURE — 90853 GROUP PSYCHOTHERAPY: CPT

## 2024-02-23 NOTE — BH NOTE
Group Therapy Note    Date: 2/23/2024    Group Start Time: 10:00 AM  Group End Time: 10:30 AM  Group Topic: Psychotherapy    [facility] BEHAVIORAL TH OP    Shameka Murphy, BRETT        Group Therapy Note    Attendees: 9 scheduled    Focus of session was based on a handout from [employer] on “How to Stop Taking Things Personally.”  We spoke about how we often get triggered by other people's attitudes and what we can do to manage internalizing it.  We spoke about accepting and realizing that other people's rudeness or attitude is often not about us.  We spoke about looking for small slivers of truth in what people are telling us when it is critical, and how we can take a different perspective.  We spoke about one of the biggest skills to cope with other people's attitudes or behaviors aimed towards you is that we have to remember we are not defined by what other people tell us but our self-worth is based on what we tell ourselves.  We spoke about people that group members have to deal with and how they can manage things differently     Behavioral Health Daily Check In form revealed that pt is not experiencing SI/HI thoughts or plans, remaining abstinent from drugs and alcohol, and reports goal today to get help.   Patient's Goal:  1Um F31.81 P “Pt will work on building ability to seek approval from within imrself and not from the opinions or statements of others and show a decrease in anxiety as a result.”      Notes:  Pt listened to the group discussion.  He was attentive and cooperative as he observed the group.  He spoke with everyone when group started and then remained quiet.     Status After Intervention:  Unchanged    Participation Level: Active Listener    Participation Quality: Appropriate and Attentive      Speech:  normal      Thought Process/Content: Logical      Affective Functioning: Congruent      Mood: euthymic      Level of

## 2024-02-23 NOTE — BH NOTE
Group Therapy Note    Date: 2/23/2024    Group Start Time: 11:00 AM  Group End Time: 11:50 AM  Group Topic: Psychotherapy    [facility] BEHAVIORAL TH OP    Shameka Murphy LCSW        Group Therapy Note    Attendees: 9 scheduled    Focus of session was on Self Respect Essentials.  We spoke about why self-respect is so important to recovery in mental health and substance abuse.  We discussed the ideas of having a sense of strengths and weaknesses, that we are entitled to be treated with respect, acceptance of self, sense of self is strong enough to withstand bumps and bruises, that we can feel worthwhile even when we don't know something, and that we don't bolster our self-respect at the expense of others.  Group members were asked to share their thoughts and feelings about this and how they can increase their ability to respect themselves.          Patient's Goal:  1Um F31.81 N .  “Pt will work on identifying if current emotional state is based on external or internal factors to help her identify an effective coping strategy.”     Notes:  Pt listened to the group activity and remained observant for most of the group. He did share that he thinks he is feeling better and not as angry as he was. His affect was somewhat brighter and he also did not make comments on the daily sheet regarding his feelings for others.     Status After Intervention:  Improved    Participation Level: Active Listener    Participation Quality: Appropriate and Attentive      Speech:  normal      Thought Process/Content: Logical      Affective Functioning: Congruent      Mood: brighter      Level of consciousness:  Attentive      Response to Learning: Able to verbalize current knowledge/experience, Able to verbalize/acknowledge new learning, Able to retain information, Capable of insight, Able to change behavior, and Progressing to goal      Endings: None Reported    Modes of

## 2024-02-26 ENCOUNTER — HOSPITAL ENCOUNTER (OUTPATIENT)
Facility: HOSPITAL | Age: 64
Setting detail: RECURRING SERIES
Discharge: HOME | End: 2024-02-29
Payer: COMMERCIAL

## 2024-02-26 PROCEDURE — 90853 GROUP PSYCHOTHERAPY: CPT

## 2024-02-26 NOTE — BH NOTE
Group Therapy Note    Date: 2/26/2024    Group Start Time: 12:00 PM  Group End Time: 12:50 PM  Group Topic: Psychotherapy    [facility] BEHAVIORAL TH OP    Shameka Murphy LCSW        Group Therapy Note    Attendees: 10 scheduled    Focus of session was on Self Respect Essentials.  We spoke about why self-respect is so important to recovery in mental health and substance abuse.  We discussed the ideas of having a sense of strengths and weaknesses, that we are entitled to be treated with respect, acceptance of self, sense of self is strong enough to withstand bumps and bruises, that we can feel worthwhile even when we don't know something, and that we don't bolster our self-respect at the expense of others.  Group members were asked to share their thoughts and feelings about this and how they can increase their ability to respect themselves.         Patient's Goal:  1Um F31.81 E .  “Pt will use stop, look, listen, think, and plan before acting once a day and identify healthy responses.”     Notes:  Pt participated in the group discussion and activity.  He stated that he has more respect for himself than he ever has.  He feels good about his efforts to come to group and states he would otherwise be lying around or drinking  and he knows now that doing that is not healthy.      Improved    Participation Level: Active Listener and Interactive    Participation Quality: Appropriate, Attentive, and Sharing      Speech:  normal      Thought Process/Content: Logical      Affective Functioning: Congruent      Mood: euthymic      Level of consciousness:  Alert and Attentive      Response to Learning: Able to verbalize current knowledge/experience, Able to verbalize/acknowledge new learning, Able to retain information, Capable of insight, Able to change behavior, and Progressing to goal      Endings: None Reported    Modes of Intervention: Education, Support,

## 2024-02-26 NOTE — BH NOTE
Group Therapy Note    Date: 2/26/2024    Group Start Time: 10:00 AM  Group End Time: 10:50 AM  Group Topic: Psychotherapy    [facility] BEHAVIORAL TH OP    Shameka Murphy LCSW        Group Therapy Note    Attendees: 10 scheduled   Focus of session was a review of the weekend and successes that can be identified in recognizing and managing triggers to negative feeling states.  We spoke about what was done in managing triggers that led to success and what was done or not done that led to struggles in maintaining and getting through difficult moments.  We also spoke about goals to work towards this week and what is reasonable to accomplish by the end of the week.    Behavioral Health Daily Check In form revealed that pt is not experiencing SI/HI thoughts or plans, remaining abstinent from drugs and alcohol, and reports goal today  to get your help      Patient's Goal:  1Um F31.81 I .  “Pt will share times in which he has overcome difficult emotional moments and the skills he can see that he used.”      Notes:  Pt participated in the group discussion.  He stated that he tried to do some things around the house this weekend.  His sister expects more to be done by next weekend and he wants to do that so\"she will keep quiet\".  He also stated that his sister is pleased with his new doctor and has been following up with her through my chart.     Status After Intervention:  Improved    Participation Level: Active Listener and Interactive    Participation Quality: Appropriate, Attentive, and Sharing      Speech:  normal      Thought Process/Content: Logical      Affective Functioning: Congruent      Mood: euthymic      Level of consciousness:  Alert and Attentive      Response to Learning: Able to verbalize current knowledge/experience, Able to verbalize/acknowledge new learning, Able to retain information, and Capable of insight      Endings: None

## 2024-02-26 NOTE — BH NOTE
Group Therapy Note    Date: 2/26/2024    Group Start Time: 11:00 AM  Group End Time: 11:50 AM  Group Topic: Psychotherapy    [facility] BEHAVIORAL [facility] OP    Larissa Gray, LCSW        Group Therapy Note    Attendees: 10 scheduled  Focus of session was on the “Three C's of Cognitive Therapy” and how they are Catch the thought that came before the emotion, Check and reflect on how accurate and useful the thought was, and Change the thought to a more accurate or helpful one as needed.  I shared how this is an easy way to remember these three strategies and how important they are to overcome a lot of unnecessary stress and negative thoughts.  Patient processed their thoughts and worked to recognize the unhealthy thoughts that are also irrational and untrue that they can work to let go of for their recovery.         Patient's Goal:  1.  UM F 31.81 M. “Pt will identify what he can do to build up his self esteem, supportive relationships, and his personal empowerment to help increase confidence for discharge.”      Notes:  Nato participated in group with prompting.  He spoke about how he is trying to get work done on his house so his sister will \"stop bothering me.\"  We spoke about what he means by that and his sister is supportive of him overall.    Status After Intervention:  Unchanged    Participation Level: Active Listener and Interactive    Participation Quality: Appropriate, Attentive, Sharing, and Supportive      Speech:  normal      Thought Process/Content: Logical  Flight of ideas      Affective Functioning: Congruent      Mood: anxious      Level of consciousness:  Alert, Oriented x4, and Attentive      Response to Learning: Able to verbalize current knowledge/experience and Able to retain information      Endings: None Reported    Modes of Intervention: Education, Support, Socialization, and Exploration      Discipline Responsible: Social

## 2024-02-28 ENCOUNTER — HOSPITAL ENCOUNTER (OUTPATIENT)
Facility: HOSPITAL | Age: 64
Setting detail: RECURRING SERIES
Discharge: HOME | End: 2024-03-02
Payer: COMMERCIAL

## 2024-02-28 PROCEDURE — 90853 GROUP PSYCHOTHERAPY: CPT

## 2024-02-28 NOTE — BH NOTE
Group Therapy Note    Date: 2/28/2024    Group Start Time: 11:00 AM  Group End Time: 11:50 AM  Group Topic: Psychotherapy    [facility] BEHAVIORAL [facility] OP    Shameka Murphy LCSW        Group Therapy Note    Attendees: 10 scheduled    Focus of session was on information about being a highly sensitive person (HSP).  We discussed how a HSP reacts to minor stressors and triggers to the same degree they would react in a very dangerous situation.   I shared how if the amygdala in the brain detects a threat of any kind, the hormones are released and we are feeling it both physically and mentally.  We spoke about how the HSP will add negative thoughts about self and our abilities and thoughts about the past and the future in there as well.  I shared 4 steps from the [employer] of how to manage episodes of stress and they include staying in the moment, practice being a witness, tuning into the body, and focusing on the breath.  We spoke about regulating our breathing can help us return our bodies and mind to a pre triggered state.        Patient's Goal:  1Um F31.81 K “Pt will identify what his tentative plans for discharge from program look like and what kind of services he can see will be beneficial to him.”     Notes:  Pt was again quiet throughout the group session. He is attentive to others as he acknowledges what they are saying through his body language and facial expressions    Status After Intervention:  Unchanged    Participation Level: Minimal    Participation Quality: Appropriate      Speech:  normal      Thought Process/Content: Logical      Affective Functioning: Congruent      Mood: euthymic      Level of consciousness:  Alert and Attentive      Response to Learning: Able to verbalize current knowledge/experience and Capable of insight      Endings: None Reported    Modes of Intervention: Education and Support      Discipline

## 2024-02-28 NOTE — BH NOTE
Group Therapy Note    Date: 2/28/2024    Group Start Time: 12:00 PM  Group End Time: 12:50 PM  Group Topic: Psychotherapy    [facility] BEHAVIORAL HLTH OP    Marina, Larissa S, LCSW        Group Therapy Note    Attendees: 10 scheduled    Focus of session was based on information from book “Own Your Past, Change Your Future” by Dr. Michel Luz.   I shared the strategy to change our actions in order to live a well life.  We spoke about the reasons shared in the book that keep us stuck in old ways because they are comfortable and safe.  We spoke about how our brains crave the familiar and we can equate predictable with safe.  We discussed the excuses we can recognize for not making the change we want and discussed if it is based on fear or something else. We spoke about how we can create a goal for this week to help us get started on letting go of the unhealthy familiar and move on to new ways of being or doing that will lead to long term health and wellness.         Patient's Goal:  1. UM F 31.81 “Pt will work to understand the connection there is between anxiety symptoms and feelings of anger, in particular over her fears about her own abilities and what people think of her    Notes:  Nato participated in group well.  He spoke about how his sister is coming in a month and he wants to have the work on the house done by then.  He spoke about how he wants to \"not have her bring it up.\"  We spoke about what changes he is experiencing that he can see are positive and giving him an opportunity to get stronger.      Status After Intervention:  Unchanged    Participation Level: Active Listener and Minimal    Participation Quality: Appropriate, Attentive, Sharing, and Supportive      Speech:  normal      Thought Process/Content: Logical  Linear      Affective Functioning: Congruent      Mood: anxious      Level of consciousness:  Alert, Oriented x4, Attentive, and

## 2024-02-28 NOTE — BH NOTE
Group Therapy Note    Date: 2/28/2024    Group Start Time: 10:00 AM  Group End Time: 10:50 AM  Group Topic: Psychotherapy    [facility] BEHAVIORAL TH OP    Shameka Murphy LCSW        Group Therapy Note    Attendees: 10    Focus of session was on identifying coping skills and what categories they may fall under.  We spoke about the different categories of distraction skills, grounding skills, emotional release skills, self care skills, thought challenge skills, and accessing our higher self skills.  We spoke about what each of these mean and the pros and cons of each category.  I asked group members to brainstorm skills for each category and how it can benefit them to use them.  We also identified cons of each category and how that can lead them to choose another way to cope with certain situations.        Behavioral Health Daily Check In form revealed that pt is not experiencing SI/HI thoughts or plans, remaining abstinent from drugs and alcohol, and reports goal today to get help  Patient's Goal:  1Um F31.81 M . “Pt will identify what he can do to build up his self esteem, supportive relationships, and his personal empowerment to help increase confidence for discharge.”      Notes:  Pt listened to and followed along with the group discussion.  He was cooperative and attentive as he observed the other members.  He remained quit throughout the group.     Status After Intervention:  Unchanged    Participation Level: Active Listener    Participation Quality: Attentive      Speech:  normal      Thought Process/Content: Logical      Affective Functioning: Congruent      Mood: euthymic      Level of consciousness:  Attentive      Response to Learning: Able to verbalize current knowledge/experience      Endings: None Reported    Modes of Intervention: Education, Support, and Clarifying      Discipline Responsible: /Counselor      Signature:

## 2024-02-29 ENCOUNTER — HOSPITAL ENCOUNTER (OUTPATIENT)
Facility: HOSPITAL | Age: 64
Setting detail: RECURRING SERIES
End: 2024-02-29
Payer: COMMERCIAL

## 2024-02-29 PROCEDURE — 90853 GROUP PSYCHOTHERAPY: CPT

## 2024-02-29 NOTE — BH NOTE
Group Therapy Note    Date: 2/29/2024    Group Start Time: 12:00 PM  Group End Time: 12:50 PM  Group Topic: Psychotherapy    [facility] BEHAVIORAL TH OP    Marina, Larissa PASCUAL, LCSW        Group Therapy Note    Attendees: 10 scheduled    Focus of session was based on information from [employer] and it was focused on how to build habits that can lead to more structure and routine. We discussed ideas like giving ourselves credit for what was accomplished, reviewing at the end of the day accomplishments and goals for tomorrow.  We discussed the idea of “good enough” and to let go of perfectionism and to set reasonable goals for ourselves in order to increase good feelings about ourselves.         Patient's Goal:   1 UM F 31.81 P. “Pt will work on building ability to seek approval from within imrself and not from the opinions or statements of others and show a decrease in anxiety as a result    Notes:  Nato participated in group with prompting.  He shared how he does not really have any way to treat himself for getting goals accomplished. He spoke about how he is going to get the drywall done on his house before his sister comes at the end of March.  We spoke about how he can treat himself to some nice dinner or lunch with  his sister.  He does not have a favorite place to eat so group made suggestions.      Status After Intervention:  Unchanged    Participation Level: Active Listener and Interactive    Participation Quality: Appropriate, Attentive, Sharing, and Supportive      Speech:  normal      Thought Process/Content: Logical      Affective Functioning: Congruent      Mood: anxious      Level of consciousness:  Alert, Oriented x4, Attentive, and Preoccupied      Response to Learning: Able to verbalize current knowledge/experience, Able to retain information, Capable of insight, and Progressing to goal      Endings: None Reported    Modes of  Intervention: Education, Support, Socialization, Exploration, and Clarifying      Discipline Responsible: /Counselor      Signature:  Larissa Gray LCSW

## 2024-02-29 NOTE — BH NOTE
Group Therapy Note    Date: 2/29/2024    Group Start Time: 11:00 AM  Group End Time: 11:50 AM  Group Topic: Psychotherapy    [facility] BEHAVIORAL TH OP    Shameka Murphy LCSW        Group Therapy Note    Attendees: 10 scheduled    Focus of session was on article “5 Steps for Managing your Emotional Triggers.”  We spoke about the importance of understanding the difference between reactions and responses to emotions.  I shared with group members the steps which include: accepting responsibility for your reactions, to recognize that you are having an emotional reaction as soon as it appears, determine what triggered the emotion, choose what you want to feel and what you want to do, and to actively shift your emotional state.   We spoke about what kind of differences that they may notice in practicing these steps and using these ideas in their emotional states.         Patient's Goal:  1Um F31.81 Q “Pt will express the belief that even with his mental health diagnosis, he can learn to thrive in his life and work towards new goals and accomplishments that increase his self esteem    Notes:  Pt participated in the group activity and engaged with others when prompted.  He shared with the group that he has the need to be respected and loved by others.  We explored what makes him feels respected and how he also wants to respect others. He shared an example of a time that he feels he was not respected at work and what that experience was like.       Status After Intervention:  Improved    Participation Level: Active Listener and Interactive    Participation Quality: Appropriate, Attentive, Sharing, and Supportive      Speech:  normal      Thought Process/Content: Logical      Affective Functioning: Congruent      Mood: euthymic      Level of consciousness:  Alert and Attentive      Response to Learning: Able to verbalize current knowledge/experience, Able

## 2024-02-29 NOTE — BH NOTE
Group Therapy Note    Date: 2/29/2024    Group Start Time: 10:00 AM  Group End Time: 10:50 AM  Group Topic: Psychotherapy    [facility] BEHAVIORAL TH OP    Marina, Larissa S, LCSW        Group Therapy Note    Attendees: 10 Scheduled  Focus of session was based on information from [employer] and we discussed the Vitality vs Suffering Diary.  We spoke about the purpose of the diary and how it can help increase our mindfulness of the moment and the choices we have in front of us.  We spoke about what vitality and suffering mean and how we can keep track of our actions and work to maintain healthy actions that further our recovery.     Behavioral Health Daily Check In form revealed that patient is not experiencing SI/HI thoughts or plans, remaining abstinent from drugs and alcohol, and report's goal today of  \"I need you to help me.\"    Patient's Goal:   1UM F 31.81 O. Pt will verbalize acceptance of himself and his abilities and work to build on new strengths he can develop.”      Notes:  Nato participated in group with prompting.  He spoke about how he knows that his past use of drugs and alcohol really impacted his relationships and his overall health and well being.  He spoke about how he wants to find the strategies and actions that he can take to help him get through the painful periods of time.  He continues to be open to support and encouragement.      Status After Intervention:  Unchanged    Participation Level: Active Listener and Interactive    Participation Quality: Appropriate, Attentive, Sharing, and Supportive      Speech:  normal      Thought Process/Content: Logical      Affective Functioning: Congruent      Mood: anxious      Level of consciousness:  Alert, Oriented x4, and Attentive      Response to Learning: Able to verbalize current knowledge/experience, Able to retain information, Capable of insight, and Progressing to  goal      Endings: None Reported    Modes of Intervention: Education, Support, Socialization, Exploration, and Clarifying      Discipline Responsible: /Counselor      Signature:  Larissa Gray LCSW

## 2024-03-05 ENCOUNTER — HOSPITAL ENCOUNTER (OUTPATIENT)
Facility: HOSPITAL | Age: 64
Setting detail: RECURRING SERIES
Discharge: HOME | End: 2024-03-08
Payer: COMMERCIAL

## 2024-03-05 PROCEDURE — 90853 GROUP PSYCHOTHERAPY: CPT

## 2024-03-05 NOTE — BH NOTE
Group Therapy Note    Date: 3/5/2024    Group Start Time: 10:00 AM  Group End Time: 10:50 AM  Group Topic: Psychotherapy    [facility] BEHAVIORAL HLTH OP    Marina, Larissa PASCUAL, LCSW        Group Therapy Note    Attendees:  10 scheduled  Focus of session was on what are the behaviors, attitudes, thought patterns, and emotions that we are working on putting in our trash can or have successfully put in the trash and what we are doing instead.  We spoke about the specific skills in regards to attitudes, thought patterns, behaviors and emotions that are in our tool box and we are working on increasing the use of these tools in order to increase our positive emotions and improve our overall functioning.  We spoke about what specific tools or trash we are having difficulty incorporating or letting go of and how we can work to improve it.  Behavioral Health Daily Check In form revealed that patient is not experiencing SI/HI thoughts or plans, remaining abstinent from drugs and alcohol, and report's goal today of \"working in the house.\"        Patient's Goal:  1. UM F 31.81  R.  Pt will work to replace black and white thinking with the ability to tolerate ambiguity and complexity in people and issues    Notes:  Nato participated in group well and he spoke about the progress he has made in his house and has the two rooms drywalled that he needed to get done. He spoke about how he is feeling good about it and he is happy to \"make my sister happy\" but we spoke about how he can enjoy his skills and hard work too.      Status After Intervention:  Improved    Participation Level: Active Listener and Interactive    Participation Quality: Appropriate, Attentive, Sharing, and Supportive      Speech:  normal      Thought Process/Content: Logical  Linear      Affective Functioning: Congruent and Flat      Mood: anxious      Level of consciousness:  Alert, Oriented x4, and 
                                                                      Group Therapy Note    Date: 3/5/2024    Group Start Time: 11:00 AM  Group End Time: 11:50 AM  Group Topic: Psychotherapy    [facility] BEHAVIORAL TH OP    Shameka Murphy LCSW        Group Therapy Note    Attendees: 10 scheduled    Focus of session was based on information from [employer] on tips for processing changes.(Pt 2 Processing Change)  We spoke about our typical reactions to change and how we cope or don't cope with it.  We spoke about what changes are coming up and what can be exciting about it but also scary.  We spoke about the tips from the handout which included focusing on what you can control, writing out feelings, keeping up with self care, finding support, tune into the good, creating plans, and thinking of your strength and resilience.  We spoke about how journaling worries and fears can help us then plan for what actions we can take.  Group members worked through a change that is happening or that they can plan for to help with the transition and not lead to setbacks in their recovery       Patient's Goal:  1UM F31.81 N “Pt will work on identifying if current emotional state is based on external or internal factors to help her identify an effective coping strategy.”     Notes:  Pt participated in the group activity and discussion.  He read aloud from the worksheet \"Tips for processing change\".  Pt was cooperative and attentive and engaged with others.  He shared that he has been working on his house, applying drywall, and he feels positive about getting this done.     Status After Intervention:  Improved    Participation Level: Active Listener and Interactive    Participation Quality: Appropriate, Attentive, and Sharing      Speech:  normal      Thought Process/Content: Logical      Affective Functioning: Congruent      Mood: euthymic      Level of consciousness:  Alert and Attentive      Response to Learning: Able to 
                                                                      Group Therapy Note    Date: 3/5/2024    Group Start Time: 12:00 PM  Group End Time: 12:50 PM  Group Topic: Psychotherapy    [facility]AVIORAL TH OP    [facility]        Group Therapy Note    Attendees:  10 scheduled    Focus of session was on the DBT skill of radical acceptance.  I shared with group the idea of acceptance has to happen in order for a person to move forward.  I shared the skills set which includes radical acceptance, turning the mind, and being willing.  I shared that radical acceptance is accepting reality as it is and that life can be worth  living even when there is pain.  We spoke about how pain and not accepting it leads to more suffering.  I shared that it is difficult to accept the things we don't like but it is necessary.   I asked group members to share one thing in their life that they have accepted that has led to a release of pain and suffering and something that they need to work on accepting in their life so they can move on.       Patient's Goal:  1. UM F 31.81  Q.  Pt will express the belief that even with his mental health diagnosis, he can learn to thrive in his life and work towards new goals and accomplishments that increase his self esteem.”    Notes:  Nato participated in group with prompting.  He spoke about how he has some new awareness of how he has handled emotions in the past. He shared his history of drinking and using drugs and how he can see he has more tolerance for handling pain than he would have ever believed.      Status After Intervention:  Unchanged    Participation Level: Active Listener and Interactive    Participation Quality: Appropriate, Attentive, Sharing, and Supportive      Speech:  normal      Thought Process/Content: Logical  Linear      Affective Functioning: Congruent      Mood: anxious and depressed      Level of consciousness:  Alert, Oriented x4, and 
C We will continue to educate pt on these differences to help him better understand it and see what coping strategies can be effective.  “Pt will work on identifying if current emotional state is based on external or internal factors to help her identify an effective coping strategy.” By 3/27/2024.     O      C We will continue to work on this and help him see what he has already changed for the better to help him see his capabilities.  “Pt will verbalize acceptance of himself and his abilities and work to build on new strengths he can develop.”  By 3/28/2024.     P C We will continue to work on this goal and build on his awareness of this need and skill.  “Pt will work on building ability to seek approval from within himself and not from the opinions or statements of others and show a decrease in anxiety as a result.”  By 3/31/2024.    PROBLEM  NUMBER: 1 PROBLEM: Unstable Mood   STG Goal Status Comments (Progress or Lack of Progress)       Q C We will continue to build on this awareness to help him cope and manage better with day to day struggles.  “Pt will express the belief that even with his mental health diagnosis, he can learn to thrive in his life and work towards new goals and accomplishments that increase his self esteem.”  By 3/30/2024.   R N     “Pt will work to replace black and white thinking with the ability to tolerate ambiguity and complexity in people and issues.”  By 3/31/2024.                             []- Check ONLY if Problem/Goal Progress Comments  Continued on Another Page

## 2024-03-06 ENCOUNTER — TELEPHONE (OUTPATIENT)
Age: 64
End: 2024-03-06

## 2024-03-06 NOTE — TELEPHONE ENCOUNTER
Harriett states that Nato's insurance denied the CT Head stating it is not medically necessary. In order to get it approved Eugenia needs to call [phone] and give case # [id number]

## 2024-03-07 ENCOUNTER — HOSPITAL ENCOUNTER (OUTPATIENT)
Facility: HOSPITAL | Age: 64
Setting detail: RECURRING SERIES
Discharge: HOME | End: 2024-03-10
Payer: COMMERCIAL

## 2024-03-07 PROCEDURE — 90853 GROUP PSYCHOTHERAPY: CPT

## 2024-03-07 NOTE — BH NOTE
Group Therapy Note    Date: 3/7/2024    Group Start Time: 10:00 AM  Group End Time: 10:50 AM  Group Topic: Psychotherapy    [facility] BEHAVIORAL TH OP    Larissa Gray, LCSW        Group Therapy Note    Attendees:  10 scheduled  Focus of session was on article titled “Developing a Positive Mental Attitude.”   We spoke about what needs to happen in order to develop a “PMA” and that includes changing the language to be more empowering and how simple changes to the words we use can impact our attitude.  We spoke about the skill of shifting your physiology and how we can focus on breathing and tension in the body and how our body's physiology changes based on what emotions that we are feeling.  We spoke about making self to self-comparisons and how we can help ourselves more when we focus on developing a stronger self.  We discussed cultivating present moment awareness and asking effective questions such as “what's great about this or what opportunities exist here?”  We spoke about what skills group members can develop.       Patient's Goal:  1. UM F 31.81 P. Pt will work on building ability to seek approval from within himself and not from the opinions or statements of others and show a decrease in anxiety as a result    Notes:  Nato participated minimally in group. He was attentive and listening but he did not share his thoughts on topic.     Status After Intervention:  Unchanged    Participation Level: Active Listener and Interactive    Participation Quality: Appropriate, Attentive, Sharing, and Supportive      Speech:  normal      Thought Process/Content: Logical      Affective Functioning: Congruent and Flat      Mood: anxious and depressed      Level of consciousness:  Alert, Oriented x4, and Attentive      Response to Learning: Able to verbalize current knowledge/experience, Able to retain information, Capable of insight, and Able to change 
                                                                      Group Therapy Note    Date: 3/7/2024    Group Start Time: 12:00 PM  Group End Time: 12:50 PM  Group Topic: Psychotherapy    [facility] BEHAVIORAL TH OP    Larissa Gray LCSW        Group Therapy Note    Attendees: 10 scheduled    Focus of session was based on handout “The 7 Inner Critics.”  We reviewed the different categories: the perfectionist, the underminer, the guilt tripper, the , the destroyer, the taskmaster, and the inner controller and what they all mean.  We spoke about recognizing any pattern for patient and their inner self talk as well as ways to combat that inner critic.         Patient's Goal:  1. UM F 31.81 G. Pt will identify some outlets he can pursue at home to provide some distraction or pleasure to help him manage his thoughts, moods, and feelings.”    Notes:  Nato participated minimally in group. He was attentive and listening but he did not share his thoughts on topic.     Status After Intervention:  Unchanged    Participation Level: Active Listener, Interactive, and Minimal    Participation Quality: Appropriate, Attentive, and Sharing      Speech:  normal      Thought Process/Content: Logical      Affective Functioning: Congruent      Mood: anxious      Level of consciousness:  Alert, Oriented x4, Attentive, and Preoccupied      Response to Learning: Able to verbalize current knowledge/experience      Endings: None Reported    Modes of Intervention: Education, Support, Socialization, Exploration, Clarifying, and Problem-solving      Discipline Responsible: /Counselor      Signature:  Larissa Gray LCSW    
Support, and Clarifying      Discipline Responsible: /Counselor      Signature:  Shameka Murphy [facility]

## 2024-03-08 ENCOUNTER — HOSPITAL ENCOUNTER (OUTPATIENT)
Facility: HOSPITAL | Age: 64
Setting detail: RECURRING SERIES
Discharge: HOME | End: 2024-03-11
Payer: COMMERCIAL

## 2024-03-08 PROCEDURE — 90853 GROUP PSYCHOTHERAPY: CPT

## 2024-03-08 NOTE — BH NOTE
Group Therapy Note    Date: 3/8/2024    Group Start Time: 11:00 AM  Group End Time: 11:50 AM  Group Topic: Psychotherapy    [facility] BEHAVIORAL TH OP    Shameka Murphy, ZAIDAW        Group Therapy Note    Attendees: 10 scheduled    Focus of session was on self-sabotage behaviors and the need to figure out why we continue to engage in them.  We spoke about reasons why people in general self-sabotage their successes and their recovery and they can include feeling fear of success and change, feeling overwhelmed, fear of the unknown, it is easier to be where we are, it's too much work to change, we cave into others demands, it takes a lot of effort to care for self when we have low self-esteem, and a lack of keo in our own abilities.  We spoke about how hating ourselves and who we are feels this cycle of self-sabotage and unhealthy behaviors.  I shared with group the need to develop tolerance for themselves and the time it takes to change and develop alternatives to self-sabotage and what those could be.        Patient's Goal:  1Um F31.81 I .  “Pt will share times in which he has overcome difficult emotional moments and the skills he can see that he used.”    Notes:  Pt participated in the group activity and discussion.  He shared that his sister is happy that he is fixing up the house. He stated that he is about done and doesn't want to do anymore.  Pt acknowledged that he wouldn't let anyone in before because it was such a mess but now he would. He is working toward his goal of building his self confidence    Status After Intervention:  Improved    Participation Level: Active Listener and Interactive    Participation Quality: Appropriate, Attentive, Sharing, and Supportive      Speech:  normal      Thought Process/Content: Logical      Affective Functioning: Congruent      Mood: euthymic      Level of consciousness:  Alert and 
normal      Thought Process/Content: Logical      Affective Functioning: Congruent      Mood: euthymic      Level of consciousness:  Alert, Oriented x4, and Attentive      Response to Learning: Able to verbalize current knowledge/experience, Able to verbalize/acknowledge new learning, Able to retain information, Capable of insight, Able to change behavior, and Progressing to goal      Endings: None Reported    Modes of Intervention: Education, Support, Socialization, Exploration, Clarifying, Problem-solving, and Activity      Discipline Responsible: /Counselor      Signature:  Shameka Murphy LCSW

## 2024-03-11 ENCOUNTER — HOSPITAL ENCOUNTER (OUTPATIENT)
Facility: HOSPITAL | Age: 64
Setting detail: RECURRING SERIES
Discharge: HOME | End: 2024-03-14
Payer: COMMERCIAL

## 2024-03-11 ENCOUNTER — TELEPHONE (OUTPATIENT)
Age: 64
End: 2024-03-11

## 2024-03-11 PROCEDURE — 90853 GROUP PSYCHOTHERAPY: CPT

## 2024-03-11 NOTE — BH NOTE
Group Therapy Note    Date: 3/11/2024    Group Start Time: 11:00 AM  Group End Time: 11:50 AM  Group Topic: Psychotherapy    [facility] BEHAVIORAL TH OP    Marina, Larissa S[facility]        Group Therapy Note    Attendees: 11 scheduled    Focus of session was from Psychology Today about “The [place] Fallacy” and the idea that we can become boxed into a way of thinking that leads to increased anxiety, depression and other emotional setbacks.  We spoke about the idea is that we want to work to always seeing an alternative way of interpreting a given set of facts and ideas for how we can put our thinking patterns and thoughts, in particular, to the test so we can look for faulty thinking patterns.  We discussed in detail that a belief is not a fact but an opinion and what beliefs patient holds right now that may be holding patient back and how to challenge that belief.         Patient's Goal:  1. UM F 31.81 R.  Pt will work to replace black and white thinking with the ability to tolerate ambiguity and complexity in people and issues    Notes:  Nato was quiet in group and he did not engage in discussion.  He left the group for a period of time.  I spoke with pt after group who reported he \"does not have any of the medications that Dr. Reilly prescribes and I won't get them until after the 15 th\" according to the company that provides his medications through the mail. I spoke with him about what can be done and he did not want anything called in locally \"because I have no money to pay for them.\"  He denied any SI or HI but said he is not sleeping and he has \"no choice but to move forward.\"  I let him know that we can try to help him resolve this once he knows what he would like to try, such as calling in scripts locally.      Status After Intervention:  Unchanged    Participation Level: Active Listener and Minimal    Participation Quality:

## 2024-03-11 NOTE — BH NOTE
Group Therapy Note    Date: 3/11/2024    Group Start Time: 10:00 AM  Group End Time: 10:50 AM  Group Topic: Psychotherapy    [facility] BEHAVIORAL [facility] OP    Shameka Murphy LCSW        Group Therapy Note    Attendees: 11 scheduled    Focus of session was a review of the weekend and successes that can be identified in recognizing and managing triggers to negative feeling states.  We spoke about what was done in managing triggers that led to success and what was done or not done that led to struggles in maintaining and getting through difficult moments.  We also spoke about goals to work towards this week and what is reasonable to accomplish by the end of the week.         Behavioral Health Daily Check In form revealed that pt is not experiencing SI/HI thoughts or plans, remaining abstinent from drugs and alcohol, and reports goal today I don't know    Patient's Goal:  1Um F31.81 M “Pt will identify what he can do to build up his self esteem, supportive relationships, and his personal empowerment to help increase confidence for discharge.”      Notes:  Pt listened to the group discussion and the concerns of other members.  He was attentive and cooperative as he observed the group.  Pt remained quiet for the rest of the group    Status After Intervention:  Unchanged    Participation Level: Active Listener    Participation Quality: Appropriate and Attentive      Speech:  normal      Thought Process/Content: Logical      Affective Functioning: Flat      Mood: euthymic      Level of consciousness:  Alert and Attentive      Response to Learning: Able to verbalize current knowledge/experience      Endings: None Reported    Modes of Intervention: Education, Support, Socialization, Exploration, and Clarifying      Discipline Responsible: /Counselor      Signature:  Shameka Murphy LCSW

## 2024-03-11 NOTE — TELEPHONE ENCOUNTER
Eugenia prescribed diclofenac 50 mg 2-21 for Nato's back. States he needs something else due to not helping his back pain. Tried offering appt and he said he already has one on 3-20.       [facility] [address] [place] [address] - [address] [facility] - P [phone] - F [phone]  [address] [facility] [address]  Phone: [phone] Fax: [phone]

## 2024-03-11 NOTE — BH NOTE
Group Therapy Note    Date: 3/11/2024    Group Start Time: 12:00 PM  Group End Time: 12:50 PM  Group Topic: Process Group - Inpatient    [facility] BEHAVIORAL HLTH OP    Shameka Murphy LCSW        Group Therapy Note    Attendees: 11 scheduled    Focus of session was on information about being a highly sensitive person (HSP).  We discussed how a HSP reacts to minor stressors and triggers to the same degree they would react in a very dangerous situation.   I shared how if the amygdala in the brain detects a threat of any kind, the hormones are released and we are feeling it both physically and mentally.  We spoke about how the HSP will add negative thoughts about self and our abilities and thoughts about the past and the future in there as well.  I shared 4 steps from the [employer] Review of how to manage episodes of stress and they include staying in the moment, practice being a witness, tuning into the body, and focusing on the breath.  We spoke about regulating our breathing can help us return our bodies and mind to a pre triggered state       Patient's Goal:  1Um F31.81 G Pt will identify some outlets he can pursue at home to provide some distraction or pleasure to help him manage his thoughts, moods, and feelings.”      Notes:  Pt participated in the group activity and discussion.  He read aloud from the worksheet and then observed other members interactions.  Pt again showed support for another pt by describing situations where he has had to control his impulses for his own best interest and that of his family.   He feels he is getting better at this through coming to group.      Status After Intervention:  Improved    Participation Level: Active Listener and Interactive    Participation Quality: Appropriate, Attentive, Sharing, and Supportive      Speech:  normal      Thought Process/Content: Logical      Affective Functioning:

## 2024-03-11 NOTE — TELEPHONE ENCOUNTER
LIANET Yung did peer to peerr for [facility] of Head, approved # [id number] until 5-03 2024. Juliet at [facility] radiology informed, she will schedule and notify patient

## 2024-03-13 ENCOUNTER — HOSPITAL ENCOUNTER (OUTPATIENT)
Facility: HOSPITAL | Age: 64
Setting detail: RECURRING SERIES
Discharge: HOME | End: 2024-03-16
Payer: COMMERCIAL

## 2024-03-13 PROCEDURE — 90853 GROUP PSYCHOTHERAPY: CPT

## 2024-03-13 NOTE — BH NOTE
Group Therapy Note    Date: 3/13/2024    Group Start Time: 10:00 AM  Group End Time: 10:50 AM  Group Topic: Psychotherapy    [facility] BEHAVIORAL TH OP    Shameka Murphy LCSW        Group Therapy Note    Attendees: 11 scheduled    Focus of session was on developing a healthy stress management plan for dealing with triggers to stress, anxiety, and worry.  We spoke about the importance of focusing on life experiences that have strengthened me and has taught us how to manage stress.  We also spoke about the importance of having a positive support network of people who can help nurture us and encourage us, we spoke about attitudes and beliefs that have helped to protect us and help us view things differently, we addressed physical self care habits and that prepare us or help us relieve tension, as well as action skills that we can use to change the situation we are in right at the moment of experiencing stress.      Behavioral Health Daily Check In form revealed that pt is not experiencing SI/HI thoughts or plans, remaining abstinent from drugs and alcohol, and reports goal today to get help    Patient's Goal:  1Um F31.81 N “Pt will work on identifying if current emotional state is based on external or internal factors to help her identify an effective coping strategy.”     Notes:  Pt listened to the group discussion and was attentive and cooperative.  Pt still has not been able to get his medication.  He spoke to everyone when group started but then remained quiet.     Status After Intervention:  Unchanged    Participation Level: Active Listener    Participation Quality: Attentive      Speech:  normal      Thought Process/Content: Logical      Affective Functioning: Flat      Mood: anxious and irritable      Level of consciousness:  Attentive and Preoccupied      Response to Learning: Able to verbalize current knowledge/experience      Endings: None

## 2024-03-13 NOTE — BH NOTE
Group Therapy Note    Date: 3/13/2024    Group Start Time: 11:00 AM  Group End Time: 11:50 AM  Group Topic: Psychotherapy    [facility] BEHAVIORAL TH OP    Marina, Larissa S, [facility]        Group Therapy Note    Attendees:  11 scheduled    Focus of session was based on information from “Psychology Tools” on handout titled “How Do Emotion Affect Your Life?”  We reviewed the handout and questions asked which included; what do you feel too much of, too little of, what do my emotions get in the way of me doing, what do my emotions lead me to do too much of, where in my body do I notice my emotions most strongly, what is important to me, and if I was living my life the way I truly wanted to, what would I be doing more of?  We processed these questions and group members worked on goals that were focused on more effective emotion management in their day to day life.           Patient's Goal:  1. UM F 31.81  Q.  “Pt will express the belief that even with his mental health diagnosis, he can learn to thrive in his life and work towards new goals and accomplishments that increase his self esteem.    Notes:  Nato participated in group with prompting.  He is quiet but he is able to engage in group and he spoke about how he needs help trying to figure out a plan for how to manage his medications.  He spoke about how he knows how important it is for him to take his medications and we spoke about how he is doing well in asking for help to figure out a plan.      Status After Intervention:  Improved    Participation Level: Active Listener and Interactive    Participation Quality: Appropriate, Attentive, Sharing, and Supportive      Speech:  normal      Thought Process/Content: Logical  Linear      Affective Functioning: Congruent and Flat      Mood: anxious      Level of consciousness:  Alert, Oriented x4, and Attentive      Response to Learning: Able to verbalize current

## 2024-03-13 NOTE — BH NOTE
Group Therapy Note    Date: 3/13/2024    Group Start Time: 12:00 PM  Group End Time: 12:50 PM  Group Topic: Psychotherapy    [facility] BEHAVIORAL TH OP    Shameka Murphy, BRETT        Group Therapy Note    Attendees: 11 scheduled    Focus of session was based on information from the article “The Science of Happiness.”  I shared with group the fact that happiness is a combination of satisfaction with life and how good you feel on a day to day basis.  We spoke about the fact that 40% of our happiness is controlled by our thoughts, actions, and behaviors, 10% by life circumstances, and 50% by genetics.  We can build skills with consistent practice to develop happiness.  We spoke about the benefits that will come when we develop these skills such as increased good health and that we will likely treat ourselves better and that we are more productive as well as have better relationships.  We spoke about strategies to increase happiness which include nurture healthy relationships, having new experiences, helping others, and being grateful for the positives we have right now. We spoke about the three skills that kill happiness which include comparing self to others, lack of close friendships, and holding onto resentments.  Group members were asked to share their thoughts on what they are willing to start doing to help themselves. (       Patient's Goal:  1Um F31.81 I “Pt will share times in which he has overcome difficult emotional moments and the skills he can see that he used.”      Notes:  Pt listened to the group activity and engaged when prompted.  He was able to share with the group that he hs not had many relationships.  He felt that it would be nice to have someone to communicate with and have a conversation. We were also able to briefly discuss his prescription situation and may end up sending his prescriptions  to [facility] in the future.     Status

## 2024-03-14 ENCOUNTER — HOSPITAL ENCOUNTER (OUTPATIENT)
Facility: HOSPITAL | Age: 64
Setting detail: RECURRING SERIES
Discharge: HOME | End: 2024-03-17
Payer: COMMERCIAL

## 2024-03-14 PROCEDURE — 99214 OFFICE O/P EST MOD 30 MIN: CPT | Performed by: PSYCHIATRY & NEUROLOGY

## 2024-03-14 PROCEDURE — 90853 GROUP PSYCHOTHERAPY: CPT

## 2024-03-14 NOTE — BH NOTE
Group Therapy Note    Date: 3/14/2024    Group Start Time: 10:00 AM  Group End Time: 10:50 AM  Group Topic: Psychotherapy    [facility] BE[facility]AVIORAL TH OP    Lar[facility] Gray[facility]        Group Therapy Note    Attendees: 9 scheduled    Focus of session was based on a handout develop by Susy Mahoney MA titled “How to Work Through Your Emotions.”  We went over the steps that handout named including naming the emotion, identifying the cause, identifying the behavior and thoughts, and challenge the emotion.  We spoke about the help and direction this handout can give to group members.  We spoke about recent emotions experienced and what can be learned from this and finding the healthy challenges to emotions.  Behavioral Health Daily Check In form revealed that patient is not experiencing SI/HI thoughts or plans, remaining abstinent from drugs and alcohol, and report's goal today of  \"to get help.\"        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  “Pt will work on identifying if current emotional state is based on external or internal factors to help her identify an effective coping strategy.”     Notes:  Nato participated in group with prompting.  He was quiet but he was easy to engage.  He spoke about how he knows that he is doing what he can but he needs to focus on receiving help when asked for it.  He spoke about how he knows that he is aware of how he has resisted help in the past and he wants to engage in receiving help.      Status After Intervention:  Unchanged    Participation Level: Active Listener and Interactive    Participation Quality: Appropriate, Attentive, Sharing, and Supportive      Speech:  normal      Thought Process/Content: Logical  Linear      Affective Functioning: Congruent      Mood: anxious and depressed      Level of consciousness:  Alert, Oriented x4, and Attentive      Response to Learning: Able to verbalize current 
                                                                      Group Therapy Note    Date: 3/14/2024    Group Start Time: 11:00 AM  Group End Time: 11:50 AM  Group Topic: Psychotherapy    [facility] BEHAVIORAL TH OP    Shameka Murphy, ZAIDAW        Group Therapy Note    Attendees: 9 scheduled    Focus of session was based on an idea developed by Nik Willson (possibility therapy) and Tommie Richardson, Ph.D. (solution-focused therapy).  It is called “Do One Thing Different” and it is supported by 12 step's motto and definition of” insanity is doing the same thing over and over again and expecting different results.”  I spoke with group members about the idea is to think about the things you do in a problem situation and change any part you can.  We spoke about how we can change our reaction, our attitude, our tone of voice, our behavior, we can think about what others do to help themselves and what works and what has worked for us in the past.  We also spoke about picturing life and what we would feel like without this problem in it.  I asked group members to identify one small thing that they can do different and how it can impact their life.         Patient's Goal:  1UM F31.81 K “Pt will identify what his tentative plans for discharge from program look like and what kind of services he can see will be beneficial to him.”     Notes:  Pt participated in the group activity and discussion.  We were able to discuss his daily schedule.  He went to bed last night about 7:30 pm and slept till one.  He drank at least two pots of coffee, took two showers and smoked pot all before coming to group. Pt is not drinking coffee all day anymore and we continue to have the discussion to switching to decaf.  He is aware that when he does not have his medication and participates in these activities it escalates his anger and frustration. He is willing to continue to work toward balancing his schedule     Status After Intervention:  
insight      Endings: None Reported    Modes of Intervention: Education, Support, Socialization, and Exploration      Discipline Responsible: /Counselor      Signature:  Larissa Gray LCSW

## 2024-03-14 NOTE — PROGRESS NOTES
(TYLENOL) 500 MG tablet Take 2 tablets by mouth 3 times daily    sucralfate (CARAFATE) 1 GM tablet     omeprazole (PRILOSEC) 40 MG delayed release capsule Take 1 capsule by mouth 2 times daily     No current facility-administered medications for this encounter.     MENTAL STATUS UPDATE: (Positives in Bold)  Appearance:   Neat   Disheveled  Odiferous   Appropriate  Orientation:   Time  Place  Person  Speech:   Coherent  Pressured  Garbled/Slurred  Loud   Soft  Mute  Memory:   Intact  Impaired (Recent  Remote)--Mild  Severe  Mood:   Euthymic  Depressed--mild  Anxious  Elated  Angry  Affect:    Appropriate  Inappropriate  Labile  Blunted  Flat--slight  Thought Content:   Logical/Appropriate  Paranoid  Delusional  Illogical IOR  SI  HI  Thought Process:   Coherent/linear  Tangential  Loose/Disorganized   Hallucinations:   None  Auditory  Visual  Tactile  Olfactory  Gustatory  Insight:   Good  Fair  Impaired      Judgment:   Good  Fair  Impaired     CONTINUED NEED FOR TREATMENT: (Positives in Bold)  Continued psychiatric symptoms causing impairment in IDL  or functioning  Continued non-compliance with meds resulting in decompensation  High level of debilitation and symptoms with inadequate support  Continued need for structured care to adjust medications  Continued presence of debilitating symptoms  Continued presence of risk factors     CONTINUED NEED FOR GROUP PSYCHOTHERAPY TO ADDRESS THE FOLLOWING: (Positives in Bold)  Psychiatric symptom management       Psychiatric relapse prevention    Anxiety management      Self-esteem issues      Poor decision making       Recent decompensation       Safety issues      Anger management     Self care/ADL's     Med/treatment compliance      Impaired boundaries/relationships     Assertiveness      Grief/loss resolution       Guilt/shame issues     Realistic goal setting      DIAGNOSTIC IMPRESSION:   Possible Bipolar disorder type II, agitated hypomania now--off meds 5 day

## 2024-03-15 ENCOUNTER — HOSPITAL ENCOUNTER (OUTPATIENT)
Facility: HOSPITAL | Age: 64
End: 2024-03-15
Payer: COMMERCIAL

## 2024-03-15 DIAGNOSIS — Z91.81 HX OF FALLING: ICD-10-CM

## 2024-03-15 PROCEDURE — 70470 CT HEAD/BRAIN W/O & W/DYE: CPT

## 2024-03-15 PROCEDURE — 6360000004 HC RX CONTRAST MEDICATION: Performed by: NURSE PRACTITIONER

## 2024-03-15 RX ADMIN — IOPAMIDOL 100 ML: 612 INJECTION, SOLUTION INTRAVENOUS at 08:23

## 2024-03-18 ENCOUNTER — HOSPITAL ENCOUNTER (OUTPATIENT)
Facility: HOSPITAL | Age: 64
Setting detail: RECURRING SERIES
Discharge: HOME | End: 2024-03-21
Payer: COMMERCIAL

## 2024-03-18 PROCEDURE — 90853 GROUP PSYCHOTHERAPY: CPT

## 2024-03-18 NOTE — BH NOTE
Group Therapy Note    Date: 3/18/2024    Group Start Time: 12:00 PM  Group End Time: 12:50 PM  Group Topic: Psychotherapy    [facility] BEHAVIORAL [facility] OP    Shameka Murphy LCSW        Group Therapy Note    Attendees: 11 scheduled    Focus of session was on developing understanding of how thoughts lead to emotions and behaviors as taught in Cognitive Behavioral Therapy.  We spoke about the technique of “knowing your ABC's” in CBT.  Group members learned about how “A” stands for the activating events that is a real external event that has occurred, or a “trigger.”  B is for “beliefs” or thoughts, attitudes, meanings we attach to A, and “C” is for consequences and our behaviors that are either constructive or destructive as a result.  We discussed recent triggers and what we thought, believed, and felt about it as well as what our choice is in regards to our actions         Patient's Goal:  1Um F31.81 O .  “Pt will verbalize acceptance of himself and his abilities and work to build on new strengths he can develop.”      Notes:  Pt listened to the group activity and discussion.  He was attentive and cooperative but remained quiet during this group    Status After Intervention:  Unchanged    Participation Level: Active Listener    Participation Quality: Appropriate and Attentive      Speech:  normal      Thought Process/Content: Logical      Affective Functioning: Congruent      Mood: euthymic      Level of consciousness:  Alert      Response to Learning: Able to verbalize current knowledge/experience      Endings: None Reported    Modes of Intervention: Education, Support, Socialization, and Exploration      Discipline Responsible: /Counselor      Signature:  Shameka Murphy LCSW

## 2024-03-18 NOTE — BH NOTE
Group Therapy Note    Date: 3/18/2024    Group Start Time: 11:00 AM  Group End Time: 11:50 AM  Group Topic: Psychotherapy    [facility] [place] BEHAVIORAL TH OP    Marina, Larissa S, [facility]        Group Therapy Note    Attendees: 11 scheduled    Focus of session was based on a handout from [employer] on “How to Stop Taking Things Personally.”  We spoke about how we often get triggered by other people's attitudes and what we can do to manage internalizing it.  We spoke about accepting and realizing that other people's rudeness or attitude is often not about us.  We spoke about looking for small slivers of truth in what people are telling us when it is critical, and how we can take a different perspective.  We spoke about one of the biggest skills to cope with other people's attitudes or behaviors aimed towards you is that we have to remember we are not defined by what other people tell us but our self-worth is based on what we tell ourselves.  We spoke about people that group members have to deal with and how they can manage things differently.       Patient's Goal:  1. UM F 31.81  G.  “Pt will identify some outlets he can pursue at home to provide some distraction or pleasure to help him manage his thoughts, moods, and feelings    Notes:  Nato participated in group with prompting.  He spoke about how he journaled about how angry he has been with some family members and we spoke about how this is what we have been encouraging him to do.  He was worried that he had done something wrong and we spoke about what he can focus on doing for himself.  We spoke about how it would be good for him to work through his anger towards his brother.     Status After Intervention:  Unchanged    Participation Level: Active Listener and Interactive    Participation Quality: Appropriate, Attentive, Sharing, and Supportive      Speech:  normal      Thought Process/Content:

## 2024-03-18 NOTE — BH NOTE
Group Therapy Note    Date: 3/18/2024    Group Start Time: 10:00 AM  Group End Time: 10:50 AM  Group Topic: Psychotherapy    [facility] BEHAVIORAL TH OP    Shameka Murphy LCSW        Group Therapy Note    Attendees: 11 scheduled    Focus of session was a review of the weekend and successes that can be identified in recognizing and managing triggers to negative feeling states.  We spoke about what was done in managing triggers that led to success and what was done or not done that led to struggles in maintaining and getting through difficult moments.  We also spoke about goals to work towards this week and what is reasonable to accomplish by the end of the week.      Behavioral Health Daily Check In form revealed that pt is not experiencing SI/HI thoughts or plans, remaining abstinent from drugs and alcohol, and reports goal today to get through the day     Patient's Goal:  1Um F31.81 Q Pt will express the belief that even with his mental health diagnosis, he can learn to thrive in his life and work towards new goals and accomplishments that increase his self esteem.”     Notes:  Pt stated he did not have a very good weekend.  He didn't do much and was bored.  Pt has a difficult time acknowledging anything that he enjoys doing or makes him happy.  We discussed possible options and he is to think about it and bring two ideas to his next group.     Status After Intervention:  Unchanged    Participation Level: Active Listener and Interactive    Participation Quality: Appropriate, Attentive, and Sharing      Speech:  normal      Thought Process/Content: Logical      Affective Functioning: Congruent      Mood: euthymic      Level of consciousness:  Alert and Attentive      Response to Learning: Able to verbalize current knowledge/experience, Able to verbalize/acknowledge new learning, Able to retain information, and Capable of insight      Endings: None

## 2024-03-19 ENCOUNTER — HOSPITAL ENCOUNTER (OUTPATIENT)
Facility: HOSPITAL | Age: 64
Setting detail: RECURRING SERIES
Discharge: HOME | End: 2024-03-22
Payer: COMMERCIAL

## 2024-03-19 PROCEDURE — 90853 GROUP PSYCHOTHERAPY: CPT

## 2024-03-19 NOTE — BH NOTE
Group Therapy Note    Date: 3/19/2024    Group Start Time: 12:00 PM  Group End Time: 12:50 PM  Group Topic: Psychotherapy    [facility] BEHAVIORAL HLTH OP    Marina, Larissa PASCUAL[facility]        Group Therapy Note    Attendees: 11 scheduled    Focus of session was on tips for how to stay positive in a negative situation.  We spoke about how we normally handle negative situations and people and the emotions that come up.  We discussed the possible impact on negative situations the following questions: is this worth getting upset over or the amount of emotions that I am feeling, what is the lesson, am I overreacting or overthinking the situation, what positives can I take away from it or what can I focus on that is somewhat positive, how is this person helping me grow, can I control the situation, and what can I do right now to feel better and it is healthy for me?  We spoke about potential negative situations that patient may be dealing with and how they can practice coping and get through it with more positive results.       Patient's Goal:  1. Um f 31.81  M.  Pt will identify what he can do to build up his self esteem, supportive relationships, and his personal empowerment to help increase confidence for discharge    Notes:  Nato participated in group well. He spoke about how he does understand that he is angry at his brother and \"he is a worthless piece of shit.\"  We spoke about how his anger is impacting him alone and not his brother. He expressed understanding of this but a struggle with being able to do this for himself.  He was open to feedback and encouragement.      Status After Intervention:  Unchanged    Participation Level: Active Listener and Interactive    Participation Quality: Appropriate, Attentive, Sharing, and Supportive      Speech:  normal      Thought Process/Content: Logical  Linear      Affective Functioning: Congruent      Mood: anxious

## 2024-03-19 NOTE — BH NOTE
Group Therapy Note    Date: 3/19/2024    Group Start Time: 11:00 AM  Group End Time: 11:50 AM  Group Topic: Psychotherapy    [facility] BEHAVIORAL TH OP    Shameka Murphy LCSW        Group Therapy Note    Attendees: 11 scheduled    Focus of session was on developing understanding of how thoughts lead to emotions and behaviors as taught in Cognitive Behavioral Therapy.  We spoke about the technique of “knowing your ABC's” in CBT.  Group members learned about how “A” stands for the activating events that is a real external event that has occurred, or a “trigger.”  B is for “beliefs” or thoughts, attitudes, meanings we attach to A, and “C” is for consequences and our behaviors that are either constructive or destructive as a result.  We discussed recent triggers and what we thought, believed, and felt about it as well as what our choice is in regards to our actions         Patient's Goal:  1Um F31.81 V “Pt will identify outlets he can start to try at home for distraction, pleasure, or purpose to help him cope more effectively at home    Notes:  Pt participated in the group activity and discussion.  He shared that he was able to come up with one idea to help him with is boredom, doing word searches. His sister had given him one and he started it yesterday.  However, he finds it difficult to do but was going to keep trying.  He is also going to continue to find something else that he could enjoy.      Status After Intervention:  Improved    Participation Level: Active Listener and Interactive    Participation Quality: Appropriate, Attentive, and Sharing      Speech:  normal      Thought Process/Content: Logical      Affective Functioning: Congruent      Mood: euthymic      Level of consciousness:  Alert and Attentive      Response to Learning: Able to verbalize current knowledge/experience, Able to verbalize/acknowledge new learning, Able to retain

## 2024-03-19 NOTE — BH NOTE
Group Therapy Note    Date: 3/19/2024    Group Start Time: 10:00 AM  Group End Time: 10:50 AM  Group Topic: Psychotherapy    [facility] BEHAVIORAL TH OP    Marina, Larissa PASCUAL, LCSW        Group Therapy Note    Attendees: 11 scheduled  Focus of session was on information from the Daily Stoic on what is a “velvet rut.”  We spoke about how it means that life is easy, not challenging, and comfortable.  We spoke about the idea is that this type of life was almost a death to the Stoic thinkers and the only way to live is to challenge yourself.  We discussed the concept that a person who has never been challenged, who always gets their way is almost a tragic figure.  We need to develop challenges in our live to grow stronger and better.  We spoke about how patient can use this focus at this time to help improve their well being and impact their recovery.       Behavioral Health Daily Check In form revealed that patient is not experiencing SI/HI thoughts or plans, remaining abstinent from drugs and alcohol, and report's goal today of  \"to get help, to try and work on forgiveness of my brother.\"    Patient's Goal:  1. Um F 31.81  I.  “Pt will share times in which he has overcome difficult emotional moments and the skills he can see that he used.    Notes:  Nato participated in group with prompting.  He spoke about how he knows he is struggling with breaking out of his old thinking patterns and reactions.  He spoke about how he has a lot of anger towards his brother and he knows \"it is not good for me.\"  He spoke about how it is doing him some good to write otu his thoughts but he is also able to see \"how much anger I do have towards him.\"      Status After Intervention:  Unchanged    Participation Level: Active Listener and Interactive    Participation Quality: Appropriate, Attentive, Sharing, and Supportive      Speech:  normal      Thought Process/Content:

## 2024-03-20 ENCOUNTER — OFFICE VISIT (OUTPATIENT)
Age: 64
End: 2024-03-20
Payer: COMMERCIAL

## 2024-03-20 VITALS
WEIGHT: 151 LBS | OXYGEN SATURATION: 97 % | DIASTOLIC BLOOD PRESSURE: 64 MMHG | TEMPERATURE: 97.3 F | HEART RATE: 56 BPM | SYSTOLIC BLOOD PRESSURE: 110 MMHG | RESPIRATION RATE: 18 BRPM | HEIGHT: 66 IN | BODY MASS INDEX: 24.27 KG/M2

## 2024-03-20 DIAGNOSIS — M54.42 CHRONIC LEFT-SIDED LOW BACK PAIN WITH LEFT-SIDED SCIATICA: Primary | ICD-10-CM

## 2024-03-20 DIAGNOSIS — G89.29 CHRONIC LEFT-SIDED LOW BACK PAIN WITH LEFT-SIDED SCIATICA: Primary | ICD-10-CM

## 2024-03-20 PROCEDURE — 3078F DIAST BP <80 MM HG: CPT | Performed by: NURSE PRACTITIONER

## 2024-03-20 PROCEDURE — 99213 OFFICE O/P EST LOW 20 MIN: CPT | Performed by: NURSE PRACTITIONER

## 2024-03-20 PROCEDURE — 20552 NJX 1/MLT TRIGGER POINT 1/2: CPT | Performed by: NURSE PRACTITIONER

## 2024-03-20 PROCEDURE — 3074F SYST BP LT 130 MM HG: CPT | Performed by: NURSE PRACTITIONER

## 2024-03-20 RX ORDER — TRIAMCINOLONE ACETONIDE 40 MG/ML
40 INJECTION, SUSPENSION INTRA-ARTICULAR; INTRAMUSCULAR ONCE
Status: COMPLETED | OUTPATIENT
Start: 2024-03-20 | End: 2024-03-20

## 2024-03-20 RX ORDER — DICLOFENAC SODIUM 75 MG/1
75 TABLET, DELAYED RELEASE ORAL 2 TIMES DAILY
Qty: 180 TABLET | Refills: 3 | Status: SHIPPED | OUTPATIENT
Start: 2024-03-20

## 2024-03-20 RX ADMIN — TRIAMCINOLONE ACETONIDE 40 MG: 40 INJECTION, SUSPENSION INTRA-ARTICULAR; INTRAMUSCULAR at 09:42

## 2024-03-20 ASSESSMENT — PATIENT HEALTH QUESTIONNAIRE - PHQ9
9. THOUGHTS THAT YOU WOULD BE BETTER OFF DEAD, OR OF HURTING YOURSELF: NOT AT ALL
10. IF YOU CHECKED OFF ANY PROBLEMS, HOW DIFFICULT HAVE THESE PROBLEMS MADE IT FOR YOU TO DO YOUR WORK, TAKE CARE OF THINGS AT HOME, OR GET ALONG WITH OTHER PEOPLE: NOT DIFFICULT AT ALL
8. MOVING OR SPEAKING SO SLOWLY THAT OTHER PEOPLE COULD HAVE NOTICED. OR THE OPPOSITE, BEING SO FIGETY OR RESTLESS THAT YOU HAVE BEEN MOVING AROUND A LOT MORE THAN USUAL: NOT AT ALL
SUM OF ALL RESPONSES TO PHQ QUESTIONS 1-9: 0
3. TROUBLE FALLING OR STAYING ASLEEP: NOT AT ALL
7. TROUBLE CONCENTRATING ON THINGS, SUCH AS READING THE NEWSPAPER OR WATCHING TELEVISION: NOT AT ALL
1. LITTLE INTEREST OR PLEASURE IN DOING THINGS: NOT AT ALL
5. POOR APPETITE OR OVEREATING: NOT AT ALL
SUM OF ALL RESPONSES TO PHQ QUESTIONS 1-9: 0
SUM OF ALL RESPONSES TO PHQ9 QUESTIONS 1 & 2: 0
2. FEELING DOWN, DEPRESSED OR HOPELESS: NOT AT ALL
6. FEELING BAD ABOUT YOURSELF - OR THAT YOU ARE A FAILURE OR HAVE LET YOURSELF OR YOUR FAMILY DOWN: NOT AT ALL
4. FEELING TIRED OR HAVING LITTLE ENERGY: NOT AT ALL

## 2024-03-20 NOTE — PROGRESS NOTES
\"Have you been to the ER, urgent care clinic since your last visit?  Hospitalized since your last visit?\"    NO    “Have you seen or consulted any other health care providers outside of [facility] since your last visit?”    NO            Click Here for Release of Records Request

## 2024-03-20 NOTE — PROGRESS NOTES
Subjective:     Nato Small is a 63 y.o. 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 Blood pressure normotensive.    Back Pain; Mr. Dutton endorses his chronic low back pain has increased to 10/10 .  He has tried alternating ice/heat, ibuprofen then naproxen and now diclofenac.  Diclofenac is not very effective at 50 mg. He would like an increase in dosage.   He has an upcoming appointment with Dr. Messer next week . He has a trigger point and requests an injection.     depression screening addressed not at risk    abuse screening addressed denies    learning assessment addressed reviewed nurses notes    fall risk addressed not at risk    HM: addressed    ROS:  Gen: denies fever, chills, fatigue, weight loss, weight gain  HEENT:denies blurry vision, nasal congestion, sore throat  Resp: denies dypsnea, cough, wheezing  CV: denies chest pain radiating to the jaws or arms, palpitations, lower extremity edema  Abd: denies nausea, vomiting, diarrhea, constipation  Neuro: denies numbness/tingling  Endo: denies polyuria, polydipsia, heat/cold intolerance  Heme: no lymphadenopathy    No Known Allergies      Current Outpatient Medications:     diclofenac (VOLTAREN) 75 MG EC tablet, Take 1 tablet by mouth 2 times daily, Disp: 180 tablet, Rfl: 3    ARIPiprazole (ABILIFY) 2 MG tablet, Take 1 tablet by mouth daily, Disp: 30 tablet, Rfl: 5    divalproex (DEPAKOTE) 500 MG DR tablet, Take 1 tablet by mouth 2 times daily, Disp: 60 tablet, Rfl: 5

## 2024-03-20 NOTE — PROGRESS NOTES
Chief Complaint   Patient presents with    Back Pain       Vitals:    03/20/24 0829   BP: 110/64   Pulse: 56   Resp: 18   Temp: 97.3 °F (36.3 °C)   SpO2: 97%

## 2024-03-21 ENCOUNTER — HOSPITAL ENCOUNTER (OUTPATIENT)
Facility: HOSPITAL | Age: 64
Setting detail: RECURRING SERIES
Discharge: HOME | End: 2024-03-24
Payer: COMMERCIAL

## 2024-03-21 PROCEDURE — 90853 GROUP PSYCHOTHERAPY: CPT

## 2024-03-21 NOTE — BH NOTE
Group Therapy Note    Date: 3/21/2024    Group Start Time: 12:00 PM  Group End Time: 12:50 PM  Group Topic: Psychotherapy    [facility]IOR[facility] TH OP    Marina[facility] PASCUAL[facility]        Group Therapy Note    Attendees: 8 scheduled    Focus of session was based on MARILYNN from Virgilio Goyal and how to manage codependent and/or unhealthy toxic relationships.  We spoke about what those relationships look like and feel like in their lives right now and what they would like to change.  Therapist reviewed that MARILYNN stands for Justifying, Arguing, Defending, Explaining.  We spoke about how and why we can fall into these habits with toxic/unhealthy people.  Group members were asked to identify one behavior that they are willing to change and we spoke about how they can work on doing that.         Patient's Goal:  1. UM F 31.81  P.  Pt will work on building ability to seek approval from within himself and not from the opinions or statements of others and show a decrease in anxiety as a result    Notes:  Nato participated in group with prompting.  He spoke about how he knows that he should not talk to one of his sisters anymore because she is so angry at others when he calls her.  He spoke about how he knows it can get him angry and upset and spoke about how he has been struggling with setting limits for himself.      Status After Intervention:  Unchanged    Participation Level: Active Listener, Interactive, and Minimal    Participation Quality: Appropriate, Attentive, Sharing, and Supportive      Speech:  normal      Thought Process/Content: Logical  Linear      Affective Functioning: Congruent      Mood: anxious and depressed      Level of consciousness:  Alert, Oriented x4, Attentive, and Preoccupied      Response to Learning: Able to verbalize current knowledge/experience      Endings: None Reported    Modes of Intervention: Education, Support, Socialization,

## 2024-03-21 NOTE — BH NOTE
Group Therapy Note    Date: 3/21/2024    Group Start Time: 11:00 AM  Group End Time: 11:50 AM  Group Topic: Psychotherapy    [facility] BEHAVIORAL TH OP    Shameka Murphy LCSW        Group Therapy Note    Attendees: 8 scheduled    Focus of session was based on an article from [employer] on 5 skills for conflict resolution.  We reviewed the five skills which are: Avoiding, Competing, Accomodating, Collaborating and Compromising.  We spoke about how these all have pros and cons and we spoke about group members particular skills that they use and how they work, and what they think they may need to change.  We spoke about current conflicts that they are experiencing and how they can work to solve the compromise.  (       Patient's Goal:  1UM F31.81 R Pt will work to replace black and white thinking with the ability to tolerate ambiguity and complexity in people and issues.”      Notes:  Pt listened to the group discussion and activity.  He shared that he is currently taking care of his sisters dog.  He likes the dog but doesn't think he wants to get one himself.  He is now walking the dog when he is up at night.  The group discussed the benefits of him possibly getting an animal.  He states he continues to drink less coffee in the morning and that is good thing      Status After Intervention:  Improved    Participation Level: Active Listener and Interactive    Participation Quality: Appropriate, Attentive, and Sharing      Speech:  normal      Thought Process/Content: Logical      Affective Functioning: Congruent      Mood: euthymic      Level of consciousness:  Alert and Attentive      Response to Learning: Able to verbalize current knowledge/experience, Able to verbalize/acknowledge new learning, Able to retain information, Capable of insight, Able to change behavior, and Progressing to goal      Endings: None Reported    Modes of

## 2024-03-21 NOTE — BH NOTE
Group Therapy Note    Date: 3/21/2024    Group Start Time: 10:00 AM  Group End Time: 10:50 AM  Group Topic: Psychotherapy    [facility] BEHAVIOR[facility] TH OP    Marina, Larissa S, LCSW        Group Therapy Note    Attendees: 8 scheduled    Focus of session was based on handouts from [facility]'s “Understanding and Reducing Angry Feelings” workbook.  We spoke about understanding the roots of anger such as fear, including fear of losing face or being embarrassed, or pain, being hurt by words and attitudes.  We spoke about anger triggers in regard to family, work, friends, and with strangers in regard to pain and fear.  We also spoke about tips for managing anger including asking self if anger is justified, talk rather than act out angry feelings.  Behavioral Health Daily Check In form revealed that patient is not experiencing SI/HI thoughts or plans, remaining abstinent from drugs and alcohol, and report's goal today of \"to get help>'          Patient's Goal:  1. UMF 31.81  N.  Pt will work on identifying if current emotional state is based on external or internal factors to help her identify an effective coping strategy    Notes:  Nato participated in group with prompting.  He spoke about how he is taking care of his sisters dog for a few days and we spoke about how this has been for him mentally.  He said he is doing a good job but he is not sure he wants to have a dog of his own.  He spoke about how he knows he has paid a lot of consequences in his life for not managing anger and he is motivated to do better overall.      Status After Intervention:  Unchanged    Participation Level: Active Listener and Interactive    Participation Quality: Appropriate, Attentive, Sharing, and Supportive      Speech:  normal      Thought Process/Content: Logical  Linear      Affective Functioning: Congruent      Mood: anxious      Level of consciousness:  Alert, Oriented x4,

## 2024-03-25 RX ORDER — LISINOPRIL 20 MG/1
20 TABLET ORAL DAILY
Qty: 90 TABLET | Refills: 1 | Status: SHIPPED | OUTPATIENT
Start: 2024-03-25

## 2024-03-25 RX ORDER — AMLODIPINE BESYLATE 10 MG/1
10 TABLET ORAL DAILY
Qty: 90 TABLET | Refills: 1 | Status: SHIPPED | OUTPATIENT
Start: 2024-03-25 | End: 2024-09-21

## 2024-03-26 ENCOUNTER — HOSPITAL ENCOUNTER (OUTPATIENT)
Facility: HOSPITAL | Age: 64
Setting detail: RECURRING SERIES
Discharge: HOME | End: 2024-03-29
Payer: COMMERCIAL

## 2024-03-26 PROCEDURE — 90853 GROUP PSYCHOTHERAPY: CPT

## 2024-03-26 NOTE — BH NOTE
Group Therapy Note    Date: 3/26/2024    Group Start Time: 12:00 PM  Group End Time: 12:50 PM  Group Topic: Psychotherapy    [facility]IOR[facility] TH OP    [facility]        Group Therapy Note    Attendees: 10 scheduled    Focus of session was from Idris Vivar book “The Feeling Good Handbook.”  We went over the information on what are the reasons people do not communicate with one another and how much a lack of communication impacts patient's day to day life.  We spoke about the reasons which include having conflict phobia, believing that we “should not feel” certain feelings like anger or depression.  We also spoke about fear of disapproval and rejection, passive aggressive habits, feelings of hopelessness, low self esteem, and an immediate need to focus on solving problems rather than listening and sharing.  We spoke about what patient can identify as any reason as to why they do not share their thoughts and feelings easily.        Patient's Goal:  1. Um F 31.81  Q.  Pt will express the belief that even with his mental health diagnosis, he can learn to thrive in his life and work towards new goals and accomplishments that increase his self esteem    Notes:  Nato participated in group with prompting.  He spoke about how he has learned a lot about how it affects him to bottle up his feelings.  He was aware that he needs to stay focused on how it is okay to feel angry and to be able to manage and maintain those feelings rather than \"unleash on my house.\"   He is open to feedback and support.      Status After Intervention:  Improved    Participation Level: Active Listener and Interactive    Participation Quality: Appropriate, Attentive, Sharing, and Supportive      Speech:  normal      Thought Process/Content: Logical  Linear      Affective Functioning: Congruent and Flat      Mood: anxious      Level of consciousness:  Alert, Oriented x4,

## 2024-03-26 NOTE — BH NOTE
Group Therapy Note    Date: 3/26/2024    Group Start Time: 10:00 AM  Group End Time: 10:50 AM  Group Topic: Psychotherapy    [facility] BEHAVIORAL TH OP    Shameka Murphy LCSW        Group Therapy Note    Attendees: 10 scheduled  Focus of session was a review of the weekend and successes that can be identified in recognizing and managing triggers to negative feeling states.  We spoke about what was done in managing triggers that led to success and what was done or not done that led to struggles in maintaining and getting through difficult moments.  We also spoke about goals to work towards this week and what is reasonable to accomplish by the end of the week.           Behavioral Health Daily Check In form revealed that pt is not experiencing SI/HI thoughts or plans, remaining abstinent from drugs and alcohol, and reports goal today to get better  Patient's Goal:  1UM F31.81 N Pt will work on identifying if current emotional state is based on external or internal factors to help her identify an effective coping strategy.”     Notes:  Pt participated in the discussion when prompted. He shared that he did not get much sleep last night and has been up since 1:00 am.  His back/leg continues to hurt and his medication had not come in the mail.  He is trying to stay calm and not let the pain drive his anger. He is scheduled to go to the orthopedic doctor on 3/28 in [facility]      Status After Intervention:  Unchanged    Participation Level: Active Listener    Participation Quality: Appropriate and Attentive      Speech:  normal      Thought Process/Content: Logical      Affective Functioning: Congruent      Mood: anxious      Level of consciousness:  Alert and Attentive      Response to Learning: Able to verbalize current knowledge/experience, Able to retain information, and Capable of insight      Endings: None Reported    Modes of Intervention:

## 2024-03-26 NOTE — BH NOTE
Group Therapy Note    Date: 3/26/2024    Group Start Time: 11:00 AM  Group End Time: 11:50 AM  Group Topic: Psychotherapy    [facility] BEHAVIORAL TH OP    Shameka Murphy, ZAIDAW        Group Therapy Note    Attendees: 10 scheduled    Focus of session was based on “Rational Rules for Living” and how following these rules can help us cope and manage intense thoughts and feelings.  We spoke about how we have thoughts without even knowing it and that some are rational and some thoughts are not.  We spoke about the fact that we can change how we think but it takes work and practice. We spoke about simplest thoughts are but how powerful they can believe if we start acting like we believe them.  We spoke about the following rules: It's nice to have peoples approval but even without it I can accept myself, Its best to focus on the positive, People are not always going to act the way that I would like them to, Things are the way they are right now and the way they should be, and I can control my thoughts, feelings, behavior, and body.  We spoke about which skill group members would like to work to incorporate into their daily life right now.         Patient's Goal:  1UM F31.81 I Pt will share times in which he has overcome difficult emotional moments and the skills he can see that he used.”     Notes:  Pt listened to the group discussion and activity.  He was attentive and cooperative as he observed the other members. He would nod in agreement with others as they shared but remained quiet.     Status After Intervention:  Unchanged    Participation Level: Active Listener    Participation Quality: Appropriate and Attentive      Speech:  normal      Thought Process/Content: Logical      Affective Functioning: Congruent      Mood: euthymic      Level of consciousness:  Alert and Attentive      Response to Learning: Able to verbalize current knowledge/experience,

## 2024-03-27 ENCOUNTER — HOSPITAL ENCOUNTER (OUTPATIENT)
Facility: HOSPITAL | Age: 64
Setting detail: RECURRING SERIES
Discharge: HOME | End: 2024-03-30
Payer: COMMERCIAL

## 2024-03-27 PROCEDURE — 90853 GROUP PSYCHOTHERAPY: CPT

## 2024-03-27 NOTE — BH NOTE
Group Therapy Note    Date: 3/27/2024    Group Start Time: 10:00 AM  Group End Time: 10:50 AM  Group Topic: Psychotherapy    [facility]IOR[facility] TH OP    [facility]        Group Therapy Note    Attendees:  8 scheduled    Focus of session was from Idris Vivar book “The Feeling Good Handbook.”  We went over “The Five Secrets of Effective Communication.”  We discussed both listening skills and self expression skills.  We discussed what it means to use the disarming technique (trying to see some truth in what others are saying), having empathy, and asking questions to learn more about what the other person is thinking or feeling.  We spoke about the self expression skills which include “I feel statements” and stroking, finding something good to say even in an argument. We spoke about how these strategies can improve patient's communications and relationships.      Behavioral Health Daily Check In form revealed that patient is not experiencing SI/HI thoughts or plans, remaining abstinent from drugs and alcohol, and report's goal today of  \"to get help.\"       Patient's Goal:  1. UM F 31.81  K.   “Pt will identify what his tentative plans for discharge from program look like and what kind of services he can see will be beneficial to him    Notes:  Nato participated in group well.  He needed encouragement and questions but he was easy to engage in group discussion.  He spoke about how he and his sister have improved on their communication and he is aware that she has said a lot to him over past several years \"and looking back on things, she was right about a lot of what she had said.\"      Status After Intervention:  Unchanged    Participation Level: Active Listener and Interactive    Participation Quality: Appropriate, Attentive, Sharing, and Supportive      Speech:  normal      Thought Process/Content: Logical  Linear      Affective

## 2024-03-27 NOTE — BH NOTE
Group Therapy Note    Date: 3/27/2024    Group Start Time: 11:00 AM  Group End Time: 11:50 AM  Group Topic: Psychotherapy    [facility] BEHAVIORAL [facility]    Shameka Murphy LCSW        Group Therapy Note    Attendees: 9 scheduled    Focus of session was on identifying core beliefs that we may have that lead us to increased feelings of depression, anxiety, anger, and low self esteem.  We spoke about the need to understand our beliefs that lead us to these painful emotions and feeling states.  We spoke about how these can be challenged by working to provide pieces of evidence to the contrary to our negative core beliefs.  We also spoke about how we can put this into practice when we are out in the world being challenged with negative thinking patterns.         Patient's Goal:  1Um F31.81 G “Pt will identify some outlets he can pursue at home to provide some distraction or pleasure to help him manage his thoughts, moods, and feelings.”      Notes:  Pt listened to the group discussion and activity .  He engaged at times but stated that he did not receive his medication for his pain and also had been up since 12:00 am.  He fell asleep at 7:00 pm and then woke up and couldn't go back to sleep.  Pt fell asleep during group and then apologized when he woke up.     Status After Intervention:  Unchanged    Participation Level: Active Listener and Minimal    Participation Quality: Appropriate and Attentive      Speech:  normal      Thought Process/Content: Logical      Affective Functioning: Flat      Mood: euthymic      Level of consciousness:  Drowsy      Response to Learning: Able to verbalize current knowledge/experience      Endings: None Reported    Modes of Intervention: Education, Support, Socialization, and Clarifying      Discipline Responsible: /Counselor      Signature:  Shameka Murphy LCSW

## 2024-03-27 NOTE — BH NOTE
Group Therapy Note    Date: 3/27/2024    Group Start Time: 12:00 PM  Group End Time: 12:50 PM  Group Topic: Psychotherapy    [facility] BEHAVIORAL TH OP    Larissa Gray LCSW        Group Therapy Note    Attendees: 8 scheduled    Focus of session was based on a handout about self-care and different aspects that we can focus on daily.  We spoke about the different areas of focus which includes physical, emotional, workplace, psychological, relationship, and spiritual.  We spoke about the different strategies and what group members would like to focus on to improve their self-care for their short and long term recovery.       Patient's Goal:  1. UM  F 31.81  P.  Pt will work on building ability to seek approval from within himself and not from the opinions or statements of others and show a decrease in anxiety as a result.    Notes:  Nato was quiet in session but he was listening.  He was able to express understanding of the different types of self care and where he can see room for improvement in certain areas.  He was able to understand the positive impact that self care can have on his overall functioning.     Status After Intervention:  Improved    Participation Level: Active Listener, Interactive, and Minimal    Participation Quality: Appropriate, Attentive, Sharing, and Supportive      Speech:  normal      Thought Process/Content: Logical  Linear      Affective Functioning: Congruent      Mood: anxious      Level of consciousness:  Alert, Oriented x4, and Attentive      Response to Learning: Able to verbalize current knowledge/experience      Endings: None Reported    Modes of Intervention: Education, Support, Socialization, and Exploration      Discipline Responsible: /Counselor      Signature:  Larissa Gray LCSW

## 2024-04-02 ENCOUNTER — HOSPITAL ENCOUNTER (OUTPATIENT)
Facility: HOSPITAL | Age: 64
Setting detail: RECURRING SERIES
Discharge: HOME | End: 2024-04-05
Payer: COMMERCIAL

## 2024-04-02 PROCEDURE — 90853 GROUP PSYCHOTHERAPY: CPT

## 2024-04-04 ENCOUNTER — HOSPITAL ENCOUNTER (OUTPATIENT)
Facility: HOSPITAL | Age: 64
Setting detail: RECURRING SERIES
Discharge: HOME | End: 2024-04-07
Payer: COMMERCIAL

## 2024-04-04 PROCEDURE — 99214 OFFICE O/P EST MOD 30 MIN: CPT | Performed by: PSYCHIATRY & NEUROLOGY

## 2024-04-04 PROCEDURE — 90853 GROUP PSYCHOTHERAPY: CPT

## 2024-04-04 NOTE — BH NOTE
Group Therapy Note    Date: 4/2/2024    Group Start Time: 11:00 AM  Group End Time: 11:50 AM  Group Topic: Psychotherapy    [facility] BEHAVIORAL TH OP    Shameka Murphy LCSW        Group Therapy Note    Attendees:10 scheduled     Focus of session was based on handout called “Ideas for Revitalizing and Energizing Yourself.”  We reviewed the list of coping skills that people can use to do the following: develop self-understanding, improve your mind, develop healthy attitudes, control your emotions, str  engthen your body, improve personal relationships, lift your spirit, improve your environment, and live a healthy lifestyle.  We spoke about which coping skills they are using successfully and which ones they can see will benefit from using and how they can get started.         Patient's Goal:  1UM F31.81 O  Pt will verbalize acceptance of himself and his abilities and work to build on new strengths he can develop.”  By     Notes:  Pt participated in the group activity and engaged with others.  He also read aloud from the worksheet .  He shared that he had been working on his house and it was going well.  He is applying dry wall to fix holes that he had done from being angry.  We discussed how therapeutically it has helped him to fix what he broke and also is managing his anger impulses better.      Status After Intervention:  Improved    Participation Level: Active Listener and Interactive    Participation Quality: Appropriate, Attentive, and Sharing      Speech:  normal      Thought Process/Content: Logical      Affective Functioning: Congruent      Mood: euthymic      Level of consciousness:  Alert and Attentive      Response to Learning: Able to verbalize current knowledge/experience, Able to verbalize/acknowledge new learning, Able to retain information, Capable of insight, Able to change behavior, and Progressing to goal      Endings: None 
                                                                      Group Therapy Note    Date: 4/2/2024    Group Start Time: 12:00 PM  Group End Time: 12:50 PM  Group Topic: Psychotherapy    [facility] BEHAVIORAL [facility] OP    Larissa Gray, \[facility]\""W        Group Therapy Note    Attendees: 7 scheduled    Focus of session was on identifying what we are doing well and what we can identify that we will benefit from continuing to do in regards to coping with our emotions and responding to them.  We also spoke about what we are doing that we know is not helping in our process of recovery and is even setting us back.  We spoke how we feel when we are successful in implementing a change and how we can tap into that to try and increase our motivation to continue to change.          Patient's Goal:  1. UM F 31.81  Q.  “Pt will express the belief that even with his mental health diagnosis, he can learn to thrive in his life and work towards new goals and accomplishments that increase his self esteem.    Notes:  Nato participated in group with prompting.  He spoke about how he is managing his emotions better.  We spoke about how and he was able to say that he is noticing more of \"when I feel bad.\"  He is working hard to not let his emotions overload him and work to find healthy outlets for his emotions.      Status After Intervention:  Unchanged    Participation Level: Active Listener and Interactive    Participation Quality: Appropriate, Attentive, Sharing, and Supportive      Speech:  normal      Thought Process/Content: Logical  Linear      Affective Functioning: Congruent      Mood: anxious and depressed      Level of consciousness:  Alert, Oriented x4, and Attentive      Response to Learning: Able to verbalize current knowledge/experience, Capable of insight, Able to change behavior, and Progressing to goal      Endings: None Reported    Modes of Intervention: Education, Support, Socialization, Exploration, and 
[facility]  Group Therapy  Treatment Plan Review    TREATMENT PLAN REVIEW REVIEW DATE: 3/29/2024     summary of Ongoing issues with Symptoms/Functional Impairments Indicating Need for Continued Stay:  Nato has been able to show progress over the past month.  he has been less angry and agitated as he was able to restabalize on his medications after running out for several days.  He has been trying to get some help for his back issues and he has finally seen an orthopedic doctor who is going to possibly do injections.  he is more hopeful but he is very vulnerable when at home alone as he has no distractions and no ability to get out of his house to do things unless his sister is in the area, which is rare.  We will continue to have pt attend three days a week at this time.      TREATMENT & DISCHARGE PLANNING CHANGES    Modality or Intervention  Changes Pt will continue to attend three groups a day, three days a week.     Psychotropic Medication Changes No changes since last review.   Discharge  Planning or Target Date  Changes No changes at this time.   New Issues No new issues     TREATMENT TEAM SIGNATURE / DATE   I have participated in the development of this plan of treatment and agreed to its implementation.   Client Signature PRINTED Name                       Nato Small Date & Time       Family / Legal Representative Signature (If Applicable) PRINTED Name & Relationship     Date & Time   Therapist Signature PRINTED Name & Credential                            Colette Gray LCSW Date & Time       Therapist Signature PRINTED Name & Credential                         Brenda Murphy LCSW   Date & Time       Other Signature PRINTED Name & Credential     Date & Time   Other Signature PRINTED Name & Credential or Relationship     Date & Time       PHYSICIAN CERTIFICATION OF THE LEVEL OF CARE:  I certify that these outpatient behavioral health services are medically necessary to 
knowledge/experience, Able to retain information, and Capable of insight      Endings: None Reported    Modes of Intervention: Education, Support, Socialization, Exploration, and Clarifying      Discipline Responsible: /Counselor      Signature:  Larissa Gray LCSW

## 2024-04-04 NOTE — BH NOTE
Group Therapy Note    Date: 4/4/2024    Group Start Time: 12:00 PM  Group End Time: 12:50 PM  Group Topic: Psychotherapy    [facility] BEHAVIORAL TH OP    Marina, Larissa S, LCSW        Group Therapy Note    Attendees: 10 scheduled    Focus of session was based on information from the book “Atomic Habits” by Laurent Donovan.  I reviewed the concept of the two-minute rule to end procrastinating and helping us to push forward.  I shared his concept of having “decisive moments” and that these moments are the key to developing successful habits.  I shared the example of getting up and getting on workout clothes is the key moment, and not actually exercising.  I shared the concept of the two-minute rule and that any new habit should be small enough to do in two minutes.  We spoke about how this concept and practice may help group members improve their progress on building healthy habits which will lead to improved quality of life.        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  “Pt will work on identifying if current emotional state is based on external or internal factors to help her identify an effective coping strategy    Notes:  Nato participated in group with prompting.  He spoke about how he is trying to get into good habits and he was able to share some thoughts on habits that he has that don't really help him.      Status After Intervention:  Unchanged    Participation Level: Active Listener and Interactive    Participation Quality: Appropriate, Attentive, Sharing, and Supportive      Speech:  normal      Thought Process/Content: Logical  Linear      Affective Functioning: Congruent      Mood: anxious      Level of consciousness:  Alert, Oriented x4, and Attentive      Response to Learning: Able to verbalize current knowledge/experience, Able to retain information, and Capable of insight      Endings: None Reported    Modes of Intervention: Education,

## 2024-04-04 NOTE — BH NOTE
Group Therapy Note    Date: 4/4/2024    Group Start Time: 10:00 AM  Group End Time: 10:50 AM  Group Topic: Psychotherapy    [facility] BEHAVIORAL [facility] OP    Larissa Gray, LCSW        Group Therapy Note    Attendees:  10 scheduled    Focus of session was on the “Three C's of Cognitive Therapy” and how they are Catch the thought that came before the emotion, Check and reflect on how accurate and useful the thought was, and Change the thought to a more accurate or helpful one as needed.  I shared how this is an easy way to remember these three strategies and how important they are to overcome a lot of unnecessary stress and negative thoughts.  Patient processed their thoughts and worked to recognize the unhealthy thoughts that are also irrational and untrue that they can work to let go of for their recovery.     Behavioral Health Daily Check In form revealed that patient is not experiencing SI/HI thoughts or plans, remaining abstinent from drugs and alcohol, and report's goal today of  \"to finish work in the house.\"       Patient's Goal:  1. UM F 31.81  P.  “Pt will work on building ability to seek approval from within himself and not from the opinions or statements of others and show a decrease in anxiety as a result    Notes:  Nato participated in group with prompting.  He spoke about how he continues to make progress on the house and he is feeling good about what he is accomplishing.  He spoke about how he is working on being more aware of his thinking patterns.      Status After Intervention:  Unchanged    Participation Level: Active Listener and Interactive    Participation Quality: Appropriate, Attentive, Sharing, and Supportive      Speech:  normal      Thought Process/Content: Logical  Linear      Affective Functioning: Congruent      Mood: anxious and irritable      Level of consciousness:  Alert, Oriented x4, Attentive, and

## 2024-04-04 NOTE — PROGRESS NOTES
BID--has 5+ months of refills (30 day)  Continue the Trazodone at 100 mg QHS PRN--has 5+ months of refills (30 day). Takes most nights  Continue SOP treatment 3 days/week. All scripts at [facility]

## 2024-04-04 NOTE — BH NOTE
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 comparisons will get you nowhere,  sometimes good people will do bad things,  a walk outside will always help, communication is key, be careful with language, sometimes you may never know why, anger is good, happiness come from the small stuff, and you are not a problem to be fixed.   Group members were asked to share their thoughts on these ideas and what they can do to help themselves to incorporate these lessons.       Patient's Goal:  1Um F31.81 G Pt will identify some outlets he can pursue at home to provide some distraction or pleasure to help him manage his thoughts, moods, and feelings.”       Notes:  Pt participated in the group activity and discussion.  He engaged with the other members and was alert and cooperative.  He stated that he is still working on mudding the walls and then will paint his three rooms.  He is proud of himself for continuing to work on the project and can see the positive outcome.     Status After Intervention:  Improved    Participation Level: Active Listener and Interactive    Participation Quality: Appropriate, Attentive, and Sharing      Speech:  normal      Thought Process/Content: Logical      Affective Functioning: Congruent      Mood: euthymic      Level of consciousness:  Alert and Attentive      Response to Learning: Able to verbalize current knowledge/experience, Able to verbalize/acknowledge new learning, Able to retain information, Capable of insight, Able to change behavior, and

## 2024-04-05 ENCOUNTER — HOSPITAL ENCOUNTER (OUTPATIENT)
Facility: HOSPITAL | Age: 64
Setting detail: RECURRING SERIES
Discharge: HOME | End: 2024-04-08
Payer: COMMERCIAL

## 2024-04-05 PROCEDURE — 90853 GROUP PSYCHOTHERAPY: CPT

## 2024-04-05 NOTE — BH NOTE
Group Therapy Note    Date: 4/5/2024    Group Start Time: 11:00 AM  Group End Time: 11:50 AM  Group Topic: Psychotherapy    [facility] BEHAVIORAL TH OP    Larissa Gray, LCSW        Group Therapy Note    Attendees:  10 scheduled    Focus of session was based on information from “Psychology Tools” on an overview on what keeps worry going.  We spoke about the concepts which includes a person's intolerance of uncertainty, having unhealthy positive beliefs about worry, approaching worry with a negative mindset, and cognitive avoidance meaning suppressing or pushing away the anxious thoughts.  Group members were asked to share what they recognize as issues for them that may be increasing and maintaining unnecessary worry that causes us damage and what their thoughts are on changing it.         Patient's Goal:  1 Um F 31.81  S.    “Pt will verbalize an understanding of the need for a process of forgiveness of others & self to reduce anger and identify who he holds anger towards at this time in his life.”      Notes:  Nato participated in group with prompting.  He spoke about how he is happy to have gotten the work done in his house and how he can recognize how much he had been worrying about it in the past.  He spoke about how he will continue to work on what he can and try to not stay stuck in worry patterns.      Status After Intervention:  Unchanged    Participation Level: Active Listener and Interactive    Participation Quality: Appropriate, Attentive, Sharing, and Supportive      Speech:  normal      Thought Process/Content: Logical  Linear      Affective Functioning: Congruent      Mood: anxious and depressed      Level of consciousness:  Alert, Oriented x4, and Attentive      Response to Learning: Able to verbalize current knowledge/experience and Able to retain information      Endings: None Reported    Modes of Intervention: Education, Support,

## 2024-04-05 NOTE — BH NOTE
Group Therapy Note    Date: 4/5/2024    Group Start Time: 10:00 AM  Group End Time: 10:50 AM  Group Topic: Psychotherapy    [facility] BEHAVIORAL TH OP    Shameka Murphy LCSW        Group Therapy Note    Attendees: 10 scheduled      Focus of session was a discussion on goal planning and was based on handout “Creating the Life that you Want: Ten Steps to Accomplishing a Goal.”  We spoke about those steps and being aware of the things you need to give up and avoid in order achieve the goal as well as what you need to start doing. We reviewed ideas for goals for lifetime, a 5 Year plan, and daily goals.  We spoke about financial, physical, social, personal, and educational/vocational.  We went over goals that are most important right now and how to get started.       Behavioral Health Daily Check In form revealed that pt is not experiencing SI/HI thoughts or plans, remaining abstinent from drugs and alcohol, and reports goal today to get    Patient's Goal:  1UM F31.81 M “Pt will identify what he can do to build up his self esteem, supportive relationships, and his personal empowerment to help increase confidence for discharge.”      Notes:  Pt listened to the group discussion and concerns of other members.  He shared that he was doing good today.  He continues to fix his walls and will begin painting soon.  He feels good about what he is doing but states he will not get angry and take it out on things again.     Status After Intervention:  Improved    Participation Level: Active Listener and Interactive    Participation Quality: Appropriate, Attentive, and Sharing      Speech:  normal      Thought Process/Content: Logical      Affective Functioning: Congruent      Mood: euthymic      Level of consciousness:  Alert, Oriented x4, and Attentive      Response to Learning: Able to verbalize current knowledge/experience, Able to verbalize/acknowledge new

## 2024-04-05 NOTE — BH NOTE
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 comparisons will get you nowhere,  sometimes good people will do bad things,  a walk outside will always help, communication is key, be careful with language, sometimes you may never know why, anger is good, happiness come from the small stuff, and you are not a problem to be fixed.   Group members were asked to share their thoughts on these ideas and what they can do to help themselves to incorporate these lessons.         Patient's Goal:  1UM F31.81 K Pt will identify what his tentative plans for discharge from program look like and what kind of services he can see will be beneficial to him.”     Notes:  Pt listened to the group discussion and activity.  He followed along with the reading of the worksheet related to eleven lessons.  He shared that he wants to continue to manage his anger better and feels the group has helped him to do this. He has had patience with the work he is doing around the how but will also be glad when it is over. Another pt asked him questions related to the work he was doing at the house and this was positive for the pt.      Status After Intervention:  Improved    Participation Level: Active Listener and Interactive    Participation Quality: Appropriate, Attentive, Sharing, and Supportive      Speech:  normal      Thought Process/Content: Logical      Affective Functioning: Congruent      Mood: euthymic      Level of consciousness:  Alert, Oriented x4, and Attentive      Response to Learning: Able

## 2024-04-08 ENCOUNTER — HOSPITAL ENCOUNTER (OUTPATIENT)
Facility: HOSPITAL | Age: 64
Setting detail: RECURRING SERIES
Discharge: HOME | End: 2024-04-11
Payer: COMMERCIAL

## 2024-04-08 PROCEDURE — 90853 GROUP PSYCHOTHERAPY: CPT

## 2024-04-08 NOTE — BH NOTE
Group Therapy Note    Date: 4/8/2024    Group Start Time: 11:00 AM  Group End Time: 11:50 AM  Group Topic: Psychotherapy    [facility] BEHAVIORAL [facility] OP    Larissa Gray, LCSW        Group Therapy Note    Attendees: 11 scheduled    Focus of session was on identifying the most damaging and dysfunctional negative thoughts that group members are experiencing right now that are significantly contributing to feelings of depression, anxiety, and/or worry right now.   We spoke about how the impact that this thought or these thoughts can have on our well being and worked with group members to find positive affirmations and thoughts that they can use to challenge these thoughts as well as boost motivation and energy to fight negative thoughts through our thoughts, behaviors, and actions.  Group members were asked to focus on this one thought and work to challenge it for the next week until they have a consistent positive thought or behavior as a result.         Patient's Goal:  1. Um F  31.81  G.  Pt will identify some outlets he can pursue at home to provide some distraction or pleasure to help him manage his thoughts, moods, and feelings    Notes:  Nato participated in group with prompting. He spoke about how he is still struggling with pain and being able to sleep.  He can recognize his thoughts get \"worse when I don't sleep well.\"  We spoke about what he needs to stay focused on which is to get the help he needs to stay focused on getting issues with his back resolved.      Status After Intervention:  Unchanged    Participation Level: Active Listener and Interactive    Participation Quality: Appropriate, Attentive, Sharing, and Supportive      Speech:  normal      Thought Process/Content: Logical  Linear      Affective Functioning: Congruent      Mood: anxious      Level of consciousness:  Alert, Oriented x4, Attentive, and Drowsy      Response to 
                                                                      Group Therapy Note    Date: 4/8/2024    Group Start Time: 12:00 PM  Group End Time: 12:50 PM  Group Topic: Psychotherapy    [facility] BEHAVIORAL TH OP    Shameka Murphy, BRETT        Group Therapy Note    Attendees: 10 scheduled  Focus of session was on identifying coping skills and what categories they may fall under.  We spoke about the different categories of distraction skills, grounding skills, emotional release skills, self care skills, thought challenge skills, and accessing our higher self skills.  We spoke about what each of these mean and the pros and cons of each category.  I asked group members to brainstorm skills for each category and how it can benefit them to use them.  We also identified cons of each category and how that can lead them to choose another way to cope with certain situations.          Patient's Goal:  1Um F31.81 G “Pt will identify some outlets he can pursue at home to provide some distraction or pleasure to help him manage his thoughts, moods, and feelings.”       Notes:  Pt listened to the group activity and followed along with the worksheet. He shared that the self care category that applied to him was work.  He stated that he likes to work and had enjoyed doing the work on his house. He shared he is looking forward to continued medical help with his back so he will feel better and can do more.     Status After Intervention:  Improved    Participation Level: Active Listener and Interactive    Participation Quality: Appropriate, Attentive, and Sharing      Speech:  normal      Thought Process/Content: Logical      Affective Functioning: Congruent      Mood: euthymic      Level of consciousness:  Alert and Attentive      Response to Learning: Able to verbalize current knowledge/experience, Able to verbalize/acknowledge new learning, Able to retain information, Capable of insight, Able to change behavior, and 
LCSW

## 2024-04-10 ENCOUNTER — HOSPITAL ENCOUNTER (OUTPATIENT)
Facility: HOSPITAL | Age: 64
Setting detail: RECURRING SERIES
Discharge: HOME | End: 2024-04-13
Payer: COMMERCIAL

## 2024-04-10 PROCEDURE — 90853 GROUP PSYCHOTHERAPY: CPT

## 2024-04-10 NOTE — BH NOTE
Group Therapy Note    Date: 4/10/2024    Group Start Time: 12:00 PM  Group End Time: 12:50 PM  Group Topic: Psychotherapy    [facility] BEHAVIORAL TH OP    Shameka Murphy LCSW        Group Therapy Note    Attendees: 10 scheduled     Focus of session was on the article \"The Emotion Chart My Therapist Gave Me That I Didn't Know I Needed.\"  We spoke about how we often are not willing to identify and process emotions as they come up and we tend to suppress or avoid them. We discussed how this then leads to overreacting later to little things that occur and we are then overly emotional. We spoke about how the Wheel of Emotions can then lead to identifying broader emotions and then can help us specify the underlying emotions.         Patient's Goal:  1Um F31.81 K Pt will identify what his tentative plans for discharge from program look like and what kind of services he can see will be beneficial to him    Notes:  Pt listened to the group activity and discussion.  He shared that he had been up since 12:00 am and his back hurts today.  He is hoping that when he gets his shot in a few weeks it will help and enable him to sleep.     Status After Intervention:  Unchanged    Participation Level: Active Listener    Participation Quality: Appropriate and Attentive      Speech:  normal      Thought Process/Content: Logical      Affective Functioning: Congruent      Mood: euthymic      Level of consciousness:  Alert and Attentive      Response to Learning: Able to verbalize current knowledge/experience      Endings: None Reported    Modes of Intervention: Education, Support, Socialization, and Exploration      Discipline Responsible: /Counselor      Signature:  Shameka Murphy LCSW

## 2024-04-10 NOTE — BH NOTE
Group Therapy Note    Date: 4/10/2024    Group Start Time: 11:00 AM  Group End Time: 11:50 AM  Group Topic: Psychotherapy    [facility] BEHAVIORAL TH OP    Larissa Gray LCSW        Group Therapy Note    Attendees: 10 scheduled    Focus of session was on a handout from [employer] on an “Unmet Emotional Needs .”  The handout asked questions to determine what needs may or may not be met at this time.  It covered relationships that are healthy, safety and security, giving and receiving attention, making gains on goals, and feel in control most of the time.  We spoke about what may be missing and how that impacts their functioning and well being.  We spoke about strategies to incorporate to help meet the missing emotional needs.        Patient's Goal:  1. UM F 31.81 R.  Pt will work to replace black and white thinking with the ability to tolerate ambiguity and complexity in people and issues.”    Notes:  Nato participated in group with prompting.  He was quiet but he was attentive in group.  He struggled with following topic but he was trying hard to interact and follow topic.      Status After Intervention:  Unchanged    Participation Level: Active Listener and Interactive    Participation Quality: Appropriate, Attentive, Sharing, and Supportive      Speech:  normal      Thought Process/Content: Logical  Linear      Affective Functioning: Congruent      Mood: anxious      Level of consciousness:  Alert, Oriented x4, Attentive, and Preoccupied      Response to Learning: Able to verbalize current knowledge/experience, Able to verbalize/acknowledge new learning, and Capable of insight      Endings: None Reported    Modes of Intervention: Education, Support, Socialization, Exploration, and Clarifying      Discipline Responsible: /Counselor      Signature:  Larissa Gray LCSW

## 2024-04-10 NOTE — BH NOTE
Group Therapy Note    Date: 4/10/2024    Group Start Time: 10:00 AM  Group End Time: 10:50 AM  Group Topic: Psychotherapy    [facility] BEHAVIORAL [facility] OP    Shameka Murphy LCSW        Group Therapy Note    Attendees: 10 scheduled    Focus of session was based on information from the book “The Brain Fog Fix” by Dr. Jarod Mendez.  We spoke about what brain fog can feel like and what it can lead to which includes increased anxiety and depression as well as problems with memory and functioning.  I focused on the information that book provides about how we eat, sleep, work, and live and how our choices disrupt our chemical levels of dopamine, serotonin, and cortisol and that unstable levels of these chemicals lead to “brain fog.”  We discussed what our brain needs and what fogs our brain that is clearly laid out in the book including diet, exercise, use of brain altering chemicals, and exposure to screens.     Behavioral Health Daily Check In form revealed that pt is not experiencing SI/HI thoughts or plans, remaining abstinent from drugs and alcohol, and reports goal today to get through the day    Patient's Goal:  1Um F31.81 M “Pt will identify what he can do to build up his self esteem, supportive relationships, and his personal empowerment to help increase confidence for discharge.”      Notes:  Pt listened to the group discussion and concerns of other members.  Pt remained quiet throughout the group.  He was attentive and cooperative as he observed the group    Status After Intervention:  Unchanged    Participation Level: Active Listener    Participation Quality: Appropriate, Attentive, Sharing, and Supportive      Speech:  normal      Thought Process/Content: Logical      Affective Functioning: Congruent      Mood: euthymic      Level of consciousness:  Alert and Attentive      Response to Learning: Able to verbalize current knowledge/experience, Able

## 2024-04-12 ENCOUNTER — HOSPITAL ENCOUNTER (OUTPATIENT)
Facility: HOSPITAL | Age: 64
Setting detail: RECURRING SERIES
End: 2024-04-12
Payer: COMMERCIAL

## 2024-04-12 ENCOUNTER — HOSPITAL ENCOUNTER (OUTPATIENT)
Facility: HOSPITAL | Age: 64
End: 2024-04-12
Attending: ORTHOPAEDIC SURGERY
Payer: COMMERCIAL

## 2024-04-12 ENCOUNTER — HOSPITAL ENCOUNTER (OUTPATIENT)
Facility: HOSPITAL | Age: 64
Setting detail: RECURRING SERIES
Discharge: HOME | End: 2024-04-15
Payer: COMMERCIAL

## 2024-04-12 DIAGNOSIS — M43.10 RETROLISTHESIS: ICD-10-CM

## 2024-04-12 DIAGNOSIS — M43.10 ISTHMIC SPONDYLOLISTHESIS: ICD-10-CM

## 2024-04-12 DIAGNOSIS — M51.36 DDD (DEGENERATIVE DISC DISEASE), LUMBAR: ICD-10-CM

## 2024-04-12 DIAGNOSIS — M54.16 RADICULOPATHY OF LUMBAR REGION: ICD-10-CM

## 2024-04-12 DIAGNOSIS — M54.32 LEFT SIDED SCIATICA: ICD-10-CM

## 2024-04-12 DIAGNOSIS — M54.50 LUMBAR PAIN: ICD-10-CM

## 2024-04-12 DIAGNOSIS — M41.50 DEGENERATIVE SCOLIOSIS: ICD-10-CM

## 2024-04-12 DIAGNOSIS — M47.816 LUMBAR SPONDYLOSIS: ICD-10-CM

## 2024-04-12 PROCEDURE — 90853 GROUP PSYCHOTHERAPY: CPT

## 2024-04-12 PROCEDURE — 72148 MRI LUMBAR SPINE W/O DYE: CPT

## 2024-04-12 NOTE — BH NOTE
Group Therapy Note    Date: 4/12/2024    Group Start Time: 11:00 AM  Group End Time: 11:50 AM  Group Topic: Psychotherapy    [facility] BEHAVIORAL TH OP    Shameka Murphy, ZAIDAW        Group Therapy Note    Attendees: 7 scheduled    Focus of session was based on handout titled “Five Guarantee's You Can't Control.”  These are 1. You cannot control what other people choose to think, 2. You cannot control what other people choose to feel, 3. You cannot control what other people choose to do, 4. If you attempt any of the above, or any combination of the above, you will feel inadequate, frustrated, angry and depressed, 5. What you do have control over is: what you think, what you feel, what you do, and how you choose to respond to what others directly express and do.  We spoke about what we can let go of and what we can take control over today.        Patient's Goal:  1Um F31.81 S Pt will verbalize an understanding of the need for a process of forgiveness of others & self to reduce anger and identify who he holds anger towards at this time in his life.”     Notes:  Pt participated in the group activity and discussion.  He shared that he had to have an MRI and explained what that procedure was like for him. He is hopeful that by doing this he will be able to get more help for his back and then be able to sleep better.      Status After Intervention:  Improved    Participation Level: Active Listener and Interactive    Participation Quality: Appropriate, Attentive, and Sharing      Speech:  normal      Thought Process/Content: Logical      Affective Functioning: Congruent      Mood: euthymic      Level of consciousness:  Alert and Attentive      Response to Learning: Able to verbalize current knowledge/experience, Able to verbalize/acknowledge new learning, Able to retain information, Capable of insight, Able to change behavior, and Progressing to 
consciousness:  Alert, Oriented x4, and Attentive      Response to Learning: Able to verbalize current knowledge/experience, Able to retain information, and Able to change behavior      Endings: None Reported    Modes of Intervention: Education, Support, Socialization, Exploration, and Clarifying      Discipline Responsible: /Counselor      Signature:  Larissa Gray LCSW

## 2024-04-16 ENCOUNTER — HOSPITAL ENCOUNTER (OUTPATIENT)
Facility: HOSPITAL | Age: 64
Setting detail: RECURRING SERIES
Discharge: HOME | End: 2024-04-19
Payer: COMMERCIAL

## 2024-04-16 PROCEDURE — 90853 GROUP PSYCHOTHERAPY: CPT

## 2024-04-16 NOTE — BH NOTE
Group Therapy Note    Date: 4/16/2024    Group Start Time: 11:00 AM  Group End Time: 11:50 AM  Group Topic: Psychotherapy    [facility] BEHAVIORAL TH OP    Shameka Murphy, BRETT        Group Therapy Note    Attendees: 12 scheduled     Focus of session was on assertiveness and a lack of it contributes to depression, anxiety, worry, and fear that build up over even small and manageable things that we need to say.  We spoke about the skill from DBT called DEAR MAN that can help us to focus on what we need to say and why we need to say it.  We spoke about how we cannot assume that others understand how we feel and we have to express ourselves to others clearly.  We spoke about how we have to change in order to help others change for us in positive ways for our relationships and that we will continue to feel the above feelings unless we take some risks and be willing to make things worse before they have a chance to get better.        Patient's Goal:  1UM F31.81 N “Pt will work on identifying if current emotional state is based on external or internal factors to help her identify an effective coping strategy.”     Notes:  Pt listened to the group discussion and concerns of other members.  He was cooperative and attentive as he observed the group.  Pt said hello to everyone at the beginning of the group but then remained quiet.     Status After Intervention:  Unchanged    Participation Level: Active Listener    Participation Quality: Appropriate and Attentive      Speech:  normal      Thought Process/Content: Logical      Affective Functioning: Congruent      Mood: euthymic      Level of consciousness:  Attentive      Response to Learning: Able to verbalize current knowledge/experience      Endings: None Reported    Modes of Intervention: Education, Support, and Socialization      Discipline Responsible: /Counselor      Signature:  Shameka 
                                                                      Group Therapy Note    Date: 4/16/2024    Group Start Time: 12:00 PM  Group End Time: 12:50 PM  Group Topic: Psychotherapy    [facility] BEHAVIORAL TH OP    Marina[facility] S[facility]        Group Therapy Note    Attendees: 12 Scheduled  Focus of session was based on DBT skills and developing the “What” skills of DBT.  We spoke about the “What” skills are observing, describing, and participating and they are core mindfulness skills and these skills help up determine where we are going in our lives, knowing who we are, and learning how to control what is in our minds.  We spoke about how these skills lead us to be able to learn how to observe and accept emotions, thoughts, situations, and experiences.  We spoke about the observing is key as this allows us to step back and allow for needed space and lets us not get caught up in the experience and create additional stress.  We spoke about how we can use these skills at this time and work to develop those valuable core mindfulness skills.        Patient's Goal:  1. Um F 31.81  Q.  Pt will express the belief that even with his mental health diagnosis, he can learn to thrive in his life and work towards new goals and accomplishments that increase his self esteem.”    Notes:  jose de jesus participated in group with prompting. He was very quiet and he spoke about how his pain level is very high today and he did not sleep well. He was able to follow along with topic but he did not share his thoughts on the topic. He was apologetic for being so quiet today.  Encouraged him to let that go as there is no need to apologize.      Status After Intervention:  Unchanged    Participation Level: Active Listener and Interactive    Participation Quality: Appropriate, Attentive, Sharing, and Supportive      Speech:  normal      Thought Process/Content: Logical  Linear      Affective Functioning: Congruent and Flat      Mood: anxious and 
Alert, Oriented x4, Attentive, and Preoccupied      Response to Learning: Able to verbalize current knowledge/experience, Able to retain information, Capable of insight, and Progressing to goal      Endings: None Reported    Modes of Intervention: Education, Support, Socialization, Exploration, and Clarifying      Discipline Responsible: /Counselor      Signature:  Larissa Gray LCSW

## 2024-04-17 ENCOUNTER — HOSPITAL ENCOUNTER (OUTPATIENT)
Facility: HOSPITAL | Age: 64
Setting detail: RECURRING SERIES
Discharge: HOME | End: 2024-04-20
Payer: COMMERCIAL

## 2024-04-17 ENCOUNTER — TELEPHONE (OUTPATIENT)
Age: 64
End: 2024-04-17

## 2024-04-17 DIAGNOSIS — M54.50 CHRONIC MIDLINE LOW BACK PAIN, UNSPECIFIED WHETHER SCIATICA PRESENT: Primary | ICD-10-CM

## 2024-04-17 DIAGNOSIS — G89.29 CHRONIC MIDLINE LOW BACK PAIN, UNSPECIFIED WHETHER SCIATICA PRESENT: Primary | ICD-10-CM

## 2024-04-17 PROCEDURE — 90853 GROUP PSYCHOTHERAPY: CPT

## 2024-04-17 RX ORDER — KETOROLAC TROMETHAMINE 10 MG/1
10 TABLET, FILM COATED ORAL EVERY 6 HOURS PRN
Qty: 20 TABLET | Refills: 0 | Status: SHIPPED | OUTPATIENT
Start: 2024-04-17 | End: 2024-04-19 | Stop reason: SDUPTHER

## 2024-04-17 NOTE — TELEPHONE ENCOUNTER
See prior note, he states his pain score in back # 10, his diclofinac and tylenol are not helping,please advise

## 2024-04-17 NOTE — BH NOTE
Group Therapy Note    Date: 4/17/2024    Group Start Time: 10:00 AM  Group End Time: 10:50 AM  Group Topic: Psychotherapy    [facility] BEHAVIORAL TH OP    Shameka Murphy LCSW        Group Therapy Note    Attendees: 11 scheduled    Focus of session was on assertiveness and a lack of it contributes to depression, anxiety, worry, and fear that build up over even small and manageable things that we need to say.  We spoke about the skill from DBT called DEAR MAN that can help us to focus on what we need to say and why we need to say it.  We spoke about how we cannot assume that others understand how we feel and we have to express ourselves to others clearly.  We spoke about how we have to change in order to help others change for us in positive ways for our relationships and that we will continue to feel the above feelings unless we take some risks and be willing to make things worse before they have a chance to get better.    Behavioral Health Daily Check In form revealed that pt is not experiencing SI/HI thoughts or plans, remaining abstinent from drugs and alcohol, and reports goal today to get help    Patient's Goal:  1Um F31.81 P “Pt will work on building ability to seek approval from within himself and not from the opinions or statements of others and show a decrease in anxiety as a result.”      Notes:  Pt shared with the group that he was not going to come today as he was experiencing severe back pain.  Once Colette called him he decided that he would come.  He also called the doctor to ask if they could call in medication.  His sister told him yesterday that the doctor had called and stated the results from his MRI were not good. He has a follow up with the doctor scheduled  on the 25th to decide how to move forward.     Status After Intervention:  Unchanged    Participation Level: Active Listener and Interactive    Participation Quality: 
                                                                      Group Therapy Note    Date: 4/17/2024    Group Start Time: 12:00 PM  Group End Time: 12:50 PM  Group Topic: Psychotherapy    [facility] BEHAVIORAL [facility] OP    Shameka Murphy LCSW        Group Therapy Note    Attendees: 11 scheduled    Focus of session was on “The Symptoms of Inner Peace” by Cecil Choudhury.  I shared the idea with group and that it includes the ideas of how inner peace is a disease and here is what it looks like.  The symptoms included “a loss of ability to worry, a loss of interest in conflict, an unmistakable ability to enjoy each moment, and frequent, overwhelming episodes of appreciation.”  I asked group members to share which “symptom” they can work to get and how they can see it will help them have more peace in their day to day life.        Patient's Goal:  1UM F31.81 R Pt will work to replace black and white thinking with the ability to tolerate ambiguity and complexity in people and issues.”  By     Notes:  Pt attempted to listen to the group activity and discussion.  He continues to have a great deal of back pain and will get up and walk around when needed. He is supportive of others and cooperative and tries to not be disruptive  in his actions.      Status After Intervention:  Unchanged    Participation Level: Minimal    Participation Quality: Attentive      Speech:  normal      Thought Process/Content: Logical      Affective Functioning: Flat      Mood: irritable      Level of consciousness:  Preoccupied      Response to Learning: Able to verbalize current knowledge/experience      Endings: None Reported    Modes of Intervention: Education, Support, Socialization, and Clarifying      Discipline Responsible: /Counselor      Signature:  Shameka Murphy LCSW    
behavior, and Progressing to goal      Endings: None Reported    Modes of Intervention: Education, Support, Socialization, Exploration, and Clarifying      Discipline Responsible: /Counselor      Signature:  Larissa Gray LCSW

## 2024-04-17 NOTE — TELEPHONE ENCOUNTER
Geovanny states his lower back pain is extremely bad and wants to know if you would be able to call in something for the pain until he sess the specialist on April 25th.       [facility] [address] [place] [address] - [address] [facility] - P [phone] - F [phone]  [address] [facility] [address]  Phone: [phone] Fax: [phone]

## 2024-04-18 ENCOUNTER — TELEPHONE (OUTPATIENT)
Age: 64
End: 2024-04-18

## 2024-04-18 ENCOUNTER — TELEPHONE (OUTPATIENT)
Facility: HOSPITAL | Age: 64
End: 2024-04-18

## 2024-04-18 RX ORDER — KETOROLAC TROMETHAMINE 10 MG/1
10 TABLET, FILM COATED ORAL EVERY 6 HOURS PRN
Qty: 20 TABLET | Refills: 0 | Status: CANCELLED | OUTPATIENT
Start: 2024-04-18 | End: 2025-04-18

## 2024-04-18 NOTE — TELEPHONE ENCOUNTER
Patient called to say he was very frustrated that his doctor's office wasn't calling him back and could I help him.  His pain medication was sent to [facility] and he intended that it go to [facility] for transportation issues and ease of pick-up.  This was brought to my attention yesterday and I reached out to the pharmacy in [place] to see if it was possible to transfer it rather then a brand new prescription.  They indicated they were working on it but it hadn't happened by mid afternoon.  So when I spoke with the patient just now, I shared with him what they had told me yesterday and said I would follow up and then reach out to the PCP's office if needed.  The pharmacy in [place] looked today and it was filled and waiting in [facility], so I asked again if it was possible to transfer it and the gentleman I was speaking with indicated that it would take some time but yes it could be moved.  I will check again later this afternoon to ensure that it was in fact moved and if they haven't done that I will reach out to the PCP and ask that a new script be sent instead to resolve the issue.

## 2024-04-19 ENCOUNTER — HOSPITAL ENCOUNTER (OUTPATIENT)
Facility: HOSPITAL | Age: 64
Setting detail: RECURRING SERIES
Discharge: HOME | End: 2024-04-22
Payer: COMMERCIAL

## 2024-04-19 PROCEDURE — 90853 GROUP PSYCHOTHERAPY: CPT

## 2024-04-19 RX ORDER — KETOROLAC TROMETHAMINE 10 MG/1
10 TABLET, FILM COATED ORAL EVERY 6 HOURS PRN
Qty: 20 TABLET | Refills: 0 | Status: SHIPPED | OUTPATIENT
Start: 2024-04-19 | End: 2025-04-19

## 2024-04-19 NOTE — BH NOTE
Group Therapy Note    Date: 4/19/2024    Group Start Time: 11:00 AM  Group End Time: 11:50 AM  Group Topic: Psychotherapy    [facility] BEHAVIORAL TH OP    Larissa Gray, ZAIDAW        Group Therapy Note    Attendees: 9 scheduled    Focus of session was based on handout “The 7 Inner Critics.”  We reviewed the different categories: the perfectionist, the underminer, the guilt tripper, the , the destroyer, the taskmaster, and the inner controller and what they all mean.  We spoke about recognizing any pattern for patient and their inner self talk as well as ways to combat that inner critic.         Patient's Goal:  1. UM F 31.81  M.  “Pt will identify what he can do to build up his self esteem, supportive relationships, and his personal empowerment to help increase confidence for discharge    Notes:  Nato participated in group with prompting.  He was brighter and spoke about how he is hopeful that this new medication for pain will help him.  He spoke about how has gotten all the work on his house done and how he does feel good about it and he is hopeful his sister will be happy for all that he has gotten accomplished.      Status After Intervention:  Improved    Participation Level: Active Listener and Interactive    Participation Quality: Appropriate, Attentive, Sharing, and Supportive      Speech:  normal      Thought Process/Content: Logical  Linear      Affective Functioning: Congruent      Mood: anxious and depressed      Level of consciousness:  Alert, Oriented x4, Attentive, and Preoccupied      Response to Learning: Able to verbalize current knowledge/experience, Able to retain information, Capable of insight, and Progressing to goal      Endings: None Reported    Modes of Intervention: Education, Support, Socialization, Exploration, and Clarifying      Discipline Responsible: /Counselor      Signature:  Larissa Gray 
                                                                      Group Therapy Note    Date: 4/19/2024    Group Start Time: 12:00 PM  Group End Time: 12:50 PM  Group Topic: Psychotherapy    [facility] BEHAVIORAL TH OP    Shameka Murphy LCSW        Group Therapy Note    Attendees: 9 scheduled     Focus of session was based on the handout “Self-Compassion in Daily Life.”  We spoke about the basis of this comes from the [employer] program (mindful self-compassion.)  We spoke about the two components are to be mindful  and aware when you are under stress and suffering and the other component is to respond to yourself with care and kindness, to be compassionate.  We spoke about different aspects of life which include physical, mental, emotional, relational, and spiritual.  We spoke about how we can focus on self-compassion through all of these aspects of life and how we already take care of ourselves and new ways we can take care of ourselves.         Patient's Goal:  1UM F31.81 K “Pt will identify what his tentative plans for discharge from program look like and what kind of services he can see will be beneficial to him.”     Notes:  The group discussed ways that they could be more compassionate towards themselves and be more in control .  Pt stated that he could choose what to do with himself and his time.  He has struggled to find things that interest him or that he feels like doing.  He would now like to focus on what he can control instead of what he can't     Status After Intervention:  Improved    Participation Level: Active Listener    Participation Quality: Appropriate, Attentive, and Sharing      Speech:  normal      Thought Process/Content: Logical      Affective Functioning: Congruent      Mood: euthymic      Level of consciousness:  Alert and Attentive      Response to Learning: Able to verbalize current knowledge/experience, Able to verbalize/acknowledge new learning, Able to retain information, Capable of 
normal      Thought Process/Content: Logical      Affective Functioning: Congruent      Mood: euthymic      Level of consciousness:  Alert, Oriented x4, and Attentive      Response to Learning: Able to verbalize current knowledge/experience, Able to verbalize/acknowledge new learning, Able to retain information, Capable of insight, Able to change behavior, and Progressing to goal      Endings: None Reported    Modes of Intervention: Education, Support, Socialization, Exploration, Clarifying, and Problem-solving      Discipline Responsible: /Counselor      Signature:  Shameka Murphy LCSW

## 2024-04-22 ENCOUNTER — HOSPITAL ENCOUNTER (OUTPATIENT)
Facility: HOSPITAL | Age: 64
Setting detail: RECURRING SERIES
Discharge: HOME | End: 2024-04-25
Payer: COMMERCIAL

## 2024-04-22 PROCEDURE — 90853 GROUP PSYCHOTHERAPY: CPT

## 2024-04-22 NOTE — BH NOTE
Group Therapy Note    Date: 4/22/2024    Group Start Time: 11:00 AM  Group End Time: 11:50 AM  Group Topic: Psychotherapy    [facility] BEHAVIORAL TH OP    Larissa Gray, [facility]        Group Therapy Note    Attendees:  8 scheduled      Focus of session was based on information from “Psychology Tools” on handout titled “What Keeps Social Anxiety going?”  We spoke about ways social anxiety is impacting patients' life and functioning at this time.  We spoke about the information that keeps social anxiety going is: your self-impression, self-image, and beliefs, negative automatic thoughts and images, self-consciousness and self-focused attention, and avoidance and safety behaviors.  We spoke about strategies and thought patterns to help challenge these thoughts and images that lead to this issue.         Patient's Goal:  1. UM F 31.81  G.  “Pt will identify some outlets he can pursue at home to provide some distraction or pleasure to help him manage his thoughts, moods, and feelings    Notes:  Nato participated in group with prompting.  He was smiling and easy to engage but he spoke about his ongoing struggles with his thoughts and how they lead him to feel certain things. He spoke about how he is doing with anxiety when going out to public places and he spoke about how that typically does not bother him.     Status After Intervention:  Unchanged    Participation Level: Active Listener and Interactive    Participation Quality: Appropriate, Attentive, Sharing, and Supportive      Speech:  normal      Thought Process/Content: Logical  Linear      Affective Functioning: Congruent and Flat      Mood: anxious and depressed      Level of consciousness:  Alert, Oriented x4, and Attentive      Response to Learning: Able to verbalize current knowledge/experience, Able to retain information, Capable of insight, and Progressing to goal      Endings: None 
                                                                      Group Therapy Note    Date: 4/22/2024    Group Start Time: 12:00 PM  Group End Time: 12:50 PM  Group Topic: Psychotherapy    [facility] BEHAVIORAL TH OP    Shameka Murphy, ZAIDAW        Group Therapy Note    Attendees: 8  Focus of session was on a strategy of how to avoid avoiding.  We spoke about the consequences of avoiding doing things, saying things, making decisions and how this behavior impacts our thinking and functioning.  We spoke about the underlying reasons why we may be procrastinating and avoiding doing or saying certain things.  I shared the strategy of simply setting a time to do things and how setting that limit can propel us into action sooner rather than later.  We spoke about making a commitment to getting one thing done a day that we can avoid and use the time limit to motivate and drive us to success.  We spoke about what positive reinforcements we can give ourselves to encourage our progress which will help to decrease our anxiety.         Patient's Goal:  1Um F31.81 O Pt will verbalize acceptance of himself and his abilities and work to build on new strengths he can develop.”      Notes:  Pt listened to the group activity and discussion.  He followed along with the reading of the worksheet.  He stated that he is trying not to avoid his anger till he explodes but is getting better at recognizing it so he can get a handle on it. He stated that he is pleased that someone is going to help him remove all of his trash today and hopefully take him to the post office.  He continues to try and organize the house to make it look better.     Status After Intervention:  Improved    Participation Level: Active Listener and Interactive    Participation Quality: Appropriate, Attentive, and Sharing      Speech:  normal      Thought Process/Content: Logical      Affective Functioning: Congruent      Mood: euthymic      Level of consciousness:  
Education, Support, Socialization, Exploration, Clarifying, Problem-solving, and Activity      Discipline Responsible: /Counselor      Signature:  Shameka Murphy LCSW

## 2024-04-23 RX ORDER — DICLOFENAC SODIUM 75 MG/1
75 TABLET, DELAYED RELEASE ORAL 2 TIMES DAILY
Qty: 60 TABLET | Refills: 3 | Status: SHIPPED | OUTPATIENT
Start: 2024-04-23

## 2024-04-23 RX ORDER — KETOROLAC TROMETHAMINE 10 MG/1
TABLET, FILM COATED ORAL
Qty: 20 TABLET | Refills: 0 | OUTPATIENT
Start: 2024-04-23

## 2024-04-24 ENCOUNTER — HOSPITAL ENCOUNTER (OUTPATIENT)
Facility: HOSPITAL | Age: 64
Setting detail: RECURRING SERIES
Discharge: HOME | End: 2024-04-27
Payer: COMMERCIAL

## 2024-04-24 PROCEDURE — 90853 GROUP PSYCHOTHERAPY: CPT

## 2024-04-24 NOTE — BH NOTE
Group Therapy Note    Date: 4/24/2024    Group Start Time: 12:00 PM  Group End Time: 12:50 PM  Group Topic: Psychotherapy    [facility] BEHAVIORAL [facility] OP    Shameka Murphy LCSW        Group Therapy Note    Attendees: 9 scheduled       Focus of session was on understanding anger and find strategies for how to work through the typical phases of anger.  We discussed how anger can include lesser feelings such as frustration, irritation, hurt, rage.  We discussed the three phases of anger which can be viewed as the red zone, which is where are only focus needs to be on calming down.  The orange zone, where our focus needs to be on thinking things through and processing our thoughts that may be increasing our anger and even stirring up more.  The green zone is when we need to work through and manage the anger that we feel.  We discussed strategies to use in the green zone such as expressing ourselves assertively, thinking of the other perspective, and relaxation skills.         Patient's Goal:  1Um F31.81 N “Pt will work on identifying if current emotional state is based on external or internal factors to help her identify an effective coping strategy.”     Notes:  Pt participated in the group activity and discussion.  He followed along with the worksheet discussing the zones.  He stated he no longer lets his anger control him to the point of punching the walls.  He tries strategies to calm himself down quicker and think about what is going on first.     Status After Intervention:  Improved    Participation Level: Active Listener and Interactive    Participation Quality: Appropriate, Attentive, Sharing, and Supportive      Speech:  normal      Thought Process/Content: Logical      Affective Functioning: Congruent      Mood: euthymic      Level of consciousness:  Alert and Attentive      Response to Learning: Able to verbalize current knowledge/experience,

## 2024-04-24 NOTE — BH NOTE
Group Therapy Note    Date: 4/24/2024    Group Start Time: 10:00 AM  Group End Time: 10:50 AM  Group Topic: Psychotherapy    [facility] BEHAVIORAL TH OP    Shameka Murphy, BRETT        Group Therapy Note    Attendees: 9 scheduled    Focus of session was on rumination and what that means and how it can affect us.  We spoke about how rumination is normal, and even healthy, when we can focus on the problems that can be solved.  Rumination is unhealthy when we ask why questions and that can lead us to a vicious cycle.  I spoke with group about how rumination can be helpful when we focus on how the problem can be solved and taking action to solve it as well as asking more how questions.  I spoke with group about differences between why and how/what questions and how they affect our drive and motivation.  I asked group members to share what they may be ruminating on and how they can help themselves solve problems rather than get stuck in a vicious cycle.       Behavioral Health Daily Check In form revealed that pt is not experiencing SI/HI thoughts or plans, remaining abstinent from drugs and alcohol, and reports goal today to get help   Patient's Goal:  1Um F31.81 P “Pt will work on building ability to seek approval from within himself and not from the opinions or statements of others and show a decrease in anxiety as a result.”      Notes:  Pt listened to the group discussion and concerns of others.  Pt had called this morning and was not going to attend today but called back and wanted to come. The group reinforced that it was a good decision for him to come and he was receptive      Status After Intervention:  Improved    Participation Level: Active Listener and Interactive    Participation Quality: Appropriate, Attentive, and Sharing      Speech:  normal      Thought Process/Content: Logical      Affective Functioning: Congruent      Mood:

## 2024-04-24 NOTE — BH NOTE
Group Therapy Note    Date: 4/24/2024    Group Start Time: 11:00 AM  Group End Time: 11:50 AM  Group Topic: Psychotherapy    [facility] BEHAVIORAL [facility] OP    [facility]        Group Therapy Note    Attendees: 9 scheduled      Focus of session was based on information from an article on “Endurance Mindset” by Sunita Dewitt.  The article interviewed Scarlet Heard who is a long-distance swimmer and how she has managed to start that in her life and what the mental strategies she has used to help her.  We spoke about the tasks and activities we want to engage in and how we have to overcome the mental challenge that “it is too overwhelming.”  I shared the quote “if your brain is not telling you the right things, you are less likely to complete something successfully.”  We spoke about self-talk that patient recognizes is getting in their way of completing either huge tasks or small ones and ways to change those thoughts.         Patient's Goal:  1. Um F 31.81 Q.  “Pt will express the belief that even with his mental health diagnosis, he can learn to thrive in his life and work towards new goals and accomplishments that increase his self esteem.”      Notes:  Nato participated in group with prompting. He spoke about how he has been struggling with pain and he was not going to come today but he pushed himself to come.  He spoke about how he is getting an injection tomorrow and he hopes that it will help him as he cannot sleep much at all. He spoke about how it benefits him to come but the pain to come is a major melton now.     Status After Intervention:  Unchanged    Participation Level: Active Listener and Interactive    Participation Quality: Appropriate, Attentive, Sharing, and Supportive      Speech:  normal      Thought Process/Content: Logical  Linear      Affective Functioning: Congruent and Flat      Mood: anxious and depressed      Level

## 2024-04-26 ENCOUNTER — HOSPITAL ENCOUNTER (OUTPATIENT)
Facility: HOSPITAL | Age: 64
Setting detail: RECURRING SERIES
Discharge: HOME | End: 2024-04-29
Payer: COMMERCIAL

## 2024-04-26 PROCEDURE — 90853 GROUP PSYCHOTHERAPY: CPT

## 2024-04-26 NOTE — BH NOTE
Group Therapy Note    Date: 4/26/2024    Group Start Time: 11:00 AM  Group End Time: 11:50 AM  Group Topic: Psychotherapy    [facility] BEHAVIORAL TH OP    Shameka Murphy, BRETT        Group Therapy Note    Attendees: 11 scheduled     Focus of session was on developing positive thoughts to contribute to self esteem to help increase functioning and well being on a daily basis.  We spoke about the negative thoughts that we can recognize that impact our self esteem and we spoke about specific challenges to these thoughts.  We spoke about thoughts that can help us through difficult relationships and interactions with others, self doubt challenges, focusing on strengths rather than limitations, and focusing on the positive changes we are working on.  We spoke about specific affirming thoughts each group member can use at this point in their recovery.       Patient's Goal:  1Um F31.81 K Pt will identify what his tentative plans for discharge from program look like and what kind of services he can see will be beneficial to him.”     Notes:  Pt participated in the group activity and discussion.  He read aloud from the worksheet and followed along .  He stated that he feels the work I do is generally good quality, and I expect to do many worthwhile things in the future.. Pt stated that he was proud of the painting that he did int he house and that his sister told him recently that she was too    Status After Intervention:  Improved    Participation Level: Active Listener    Participation Quality: Appropriate and Attentive      Speech:  normal      Thought Process/Content: Logical      Affective Functioning: Congruent      Mood: euthymic      Level of consciousness:  Alert and Attentive      Response to Learning: Able to verbalize current knowledge/experience, Able to verbalize/acknowledge new learning, Able to retain information, Capable of insight, Able to 
anxious      Level of consciousness:  Alert, Oriented x4, Attentive, and Preoccupied      Response to Learning: Able to verbalize current knowledge/experience, Able to retain information, Capable of insight, and Progressing to goal      Endings: None Reported    Modes of Intervention: Education, Support, Socialization, Exploration, and Clarifying      Discipline Responsible: /Counselor      Signature:  Larissa Gray LCSW    
he spoke about how it helps him to be accountable to people here to \"do good.\"      Status After Intervention:  Improved    Participation Level: Active Listener and Interactive    Participation Quality: Appropriate, Attentive, Sharing, and Supportive      Speech:  normal      Thought Process/Content: Logical      Affective Functioning: Congruent      Mood: anxious      Level of consciousness:  Alert, Oriented x4, and Attentive      Response to Learning: Able to verbalize current knowledge/experience, Able to retain information, and Capable of insight      Endings: None Reported    Modes of Intervention: Education, Support, Socialization, Exploration, and Clarifying      Discipline Responsible: /Counselor      Signature:  Larissa Gray LCSW

## 2024-04-29 ENCOUNTER — HOSPITAL ENCOUNTER (OUTPATIENT)
Facility: HOSPITAL | Age: 64
Setting detail: RECURRING SERIES
Discharge: HOME | End: 2024-05-02
Payer: COMMERCIAL

## 2024-04-29 PROCEDURE — 90853 GROUP PSYCHOTHERAPY: CPT

## 2024-04-29 NOTE — BH NOTE
Group Therapy Note    Date: 4/29/2024    Group Start Time: 11:00 AM  Group End Time: 11:30 AM  Group Topic: Psychotherapy    [facility] BEHAVIORAL TH OP    Shameka Murphy, BRETT        Group Therapy Note    Attendees: 7 scheduled     Focus of session as on ways to increase and improve self esteem.  We spoke about the importance of focusing on strengths and what we can do rather than what we can't.  We spoke about controlling our inner critic and make an effort to fight negative judgments and beliefs about ourselves and to not compare ourselves to others and what we think they are accomplishing or capable of since those are mostly assumptions.  We spoke about setting reasonable goals and expectations and not over or under estimating what we are capable of and allow us to stretch our abilities.  We spoke about solving problems versus avoiding them and relying first and foremost on our own opinion of ourselves.          Patient's Goal:  1Um F31.81 M .  “Pt will identify what he can do to build up his self esteem, supportive relationships, and his personal empowerment to help increase confidence for discharge.”      Notes:  Pt participated in the group activity and discussion.  He followed along with the reading of the worksheet related to self-esteem.  Pt stated that he has a fear of failure, afraid to share his opinions and compares himself negatively to others.  We discussed this further and shared coping strategies.  Pt did acknowledge that he felt good about his work when he painted and dry walled the house.  He was pleased that his sister acknowledged his work also     Status After Intervention:  Improved    Participation Level: Active Listener and Interactive    Participation Quality: Appropriate, Attentive, Sharing, and Supportive      Speech:  normal      Thought Process/Content: Logical      Affective Functioning: Congruent      Mood: 
goal      Endings: None Reported    Modes of Intervention: Education, Support, Socialization, Exploration, Clarifying, Problem-solving, and Activity      Discipline Responsible: /Counselor      Signature:  Shameka Murphy LCSW

## 2024-05-01 ENCOUNTER — HOSPITAL ENCOUNTER (OUTPATIENT)
Facility: HOSPITAL | Age: 64
Setting detail: RECURRING SERIES
Discharge: HOME | End: 2024-05-04
Payer: COMMERCIAL

## 2024-05-01 PROCEDURE — 90853 GROUP PSYCHOTHERAPY: CPT

## 2024-05-01 NOTE — BH NOTE
Group Therapy Note    Date: 5/1/2024    Group Start Time: 10:00 AM  Group End Time: 10:50 AM  Group Topic: Psychotherapy    [facility] BEHAVIORAL TH OP    Shameka Murphy LCSW        Group Therapy Note    Attendees: 10 scheduled     Focus of session was on the visible and the hidden emotional threats that we are facing and trying to cope with.  We spoke about the three steps to emotional management and they include awareness, plan, and action.  We spoke about the importance of being aware of our hidden emotional threats or triggers since they can be more destructive and unmanageable that the visible ones.  We spoke about how our hidden emotional triggers can include low self esteem issues, unrealistic expectations, chronic worry that leads to increased vulnerability as well as focus on the past and our mistakes that we made.  We spoke about how a person will be increasingly vulnerable if hidden emotional threats are not identified and managed.           Behavioral Health Daily Check In form revealed that pt is not experiencing SI/HI thoughts or plans, remaining abstinent from drugs and alcohol, and reports goal today to get help.   Patient's Goal:    1UM F31.81 R .  “Pt will work to replace black and white thinking with the ability to tolerate ambiguity and complexity in people and issues.”      Notes:  Pt listened to the group discussion and concerns of other members.  He introduced himself to the new group member and was able to share some of his mental health concerns and history with alcohol.  Hie affect was bright today and he was very welcoming     Status After Intervention:  Improved    Participation Level: Active Listener and Interactive    Participation Quality: Appropriate, Attentive, Sharing, and Supportive      Speech:  normal      Thought Process/Content: Logical      Affective Functioning: Congruent      Mood: euthymic      Level of

## 2024-05-01 NOTE — BH NOTE
Group Therapy Note    Date: 5/1/2024    Group Start Time: 11:00 AM  Group End Time: 11:50 AM  Group Topic: Psychotherapy    [facility] BEHAVIORAL TH OP    Marina, Larissa S, [facility]        Group Therapy Note    Attendees:  10 scheduled    Focus of session was on mindfulness of others and how this can improve relationships in our lives.  We spoke about how this can mean that we really work to pay attention to what others are saying and how to clear up any conflicts with others as peacefully but effectively as we can.  We spoke about how we can offer validation of others and how this can improve relationships and can be the encouragement others need to validate us when we need to.  We spoke about the importance of not making assumptions and to be our own person in any relationship.  We then spoke about how we can practice being mindful of others such as letting go of focus on my own internal thoughts and be curious about others around me.        Patient's Goal:  1. UM F 31.81  S.  Pt will verbalize an understanding of the need for a process of forgiveness of others & self to reduce anger and identify who he holds anger towards at this time in his life    Notes:  Nato participated in group with prompting.  He spoke about how he is trying to be more thoughtful of his interactions with his sister. He spoke about how they have had their difficulties in the past and he has recently found out that social security is having him have a payee, his niece. He spoke about how he is okay with it but it was not communicated with him other that through a letter from [employer].  He agreed he can express that and ask his sister if this was her idea or social security's.      Status After Intervention:  Unchanged    Participation Level: Active Listener and Interactive    Participation Quality: Appropriate, Attentive, Sharing, and Supportive      Speech:

## 2024-05-01 NOTE — BH NOTE
Group Therapy Note    Date: 5/1/2024    Group Start Time: 12:00 PM  Group End Time: 12:50 PM  Group Topic: Psychotherapy    [facility] BEHAVIORAL TH OP    Shameka Murphy, BRETT        Group Therapy Note    Attendees: 10 scheduled    Focus of session was on identifying core beliefs that we may have that lead us to increased feelings of depression, anxiety, anger, and low self esteem.  We spoke about the need to understand our beliefs that lead us to these painful emotions and feeling states.  We spoke about how these can be challenged by working to provide pieces of evidence to the contrary to our negative core beliefs.  We also spoke about how we can put this into practice when we are out in the world being challenged with negative thinking patterns.         Patient's Goal:  1UM F31.81 O .  “Pt will verbalize acceptance of himself and his abilities and work to build on new strengths he can develop.”      Notes:  Pt listened to the group activity and engaged at times when prompted. He followed along with the reading of the worksheet related to core beliefs.  He shared at times he has felt negative about himself and not worthy.  However, he is beginning to feel more confident and continues to work toward this.     Status After Intervention:  Improved    Participation Level: Active Listener    Participation Quality: Appropriate, Attentive, and Sharing      Speech:  normal      Thought Process/Content: Logical      Affective Functioning: Congruent      Mood: euthymic      Level of consciousness:  Alert and Attentive      Response to Learning: Able to verbalize current knowledge/experience, Able to verbalize/acknowledge new learning, Able to retain information, Capable of insight, Able to change behavior, and Progressing to goal      Endings: None Reported    Modes of Intervention: Education, Support, Socialization, Exploration, Clarifying,

## 2024-05-02 ENCOUNTER — HOSPITAL ENCOUNTER (OUTPATIENT)
Facility: HOSPITAL | Age: 64
Setting detail: RECURRING SERIES
Discharge: HOME | End: 2024-05-05
Payer: COMMERCIAL

## 2024-05-02 PROCEDURE — 90853 GROUP PSYCHOTHERAPY: CPT

## 2024-05-03 NOTE — BH NOTE
Group Therapy Note    Date: 5/2/2024    Group Start Time: 11:00 AM  Group End Time: 11:50 AM  Group Topic: Psychotherapy    [facility] BEHAVIORAL [facility] OP    Shameka Murphy, BRETT        Group Therapy Note    Attendees: 11 scheduled     Focus of session was on identifying core beliefs that we may have that lead us to increased feelings of depression, anxiety, anger, and low self esteem.  We spoke about the need to understand our beliefs that lead us to these painful emotions and feeling states.  We spoke about how these can be challenged by working to provide pieces of evidence to the contrary to our negative core beliefs.  We also spoke about how we can put this into practice when we are out in the world being challenged with negative thinking patterns.         Patient's Goal:  1Um F31.81 S Pt will verbalize an understanding of the need for a process of forgiveness of others & self to reduce anger and identify who he holds anger towards at this time in his life.”      Notes:  Pt listened to the group activity and discussion.  He engaged with other members throughout the group.  Pt was attentive and cooperative and appeared to have a brighter affect today.     Status After Intervention:  Improved    Participation Level: Active Listener and Interactive    Participation Quality: Appropriate, Attentive, and Sharing      Speech:  normal      Thought Process/Content: Logical      Affective Functioning: Congruent      Mood: euthymic      Level of consciousness:  Alert and Attentive      Response to Learning: Able to verbalize current knowledge/experience, Able to verbalize/acknowledge new learning, Able to retain information, Capable of insight, Able to change behavior, and Progressing to goal      Endings: None Reported    Modes of Intervention: Education, Support, Socialization, Exploration, and Clarifying      Discipline Responsible: Social 
                                                                      Group Therapy Note    Date: 5/2/2024    Group Start Time: 12:00 PM  Group End Time: 12:50 PM  Group Topic: Psychotherapy    [facility] BEHAVIORAL TH OP    Larissa Gray, LCSW        Group Therapy Note    Attendees: 11 scheduled  Focus of session was based on article “The Difference Between Needs and Wants.”  We spoke about the concept by Kunla “Hierarchy of Needs” and went over the different aspects and that having all our basic needs met brings us to a starting point or ground zero to build upon our wants and aspects of life that bring us moments of yogi and self-fulfillment.  We spoke about what needs are not met that prevent us from being able to move forward and what can be done to get those needs met.  We spoke about developing a plan to be able to address the unmet needs and be able to move forward to better well being and relationships.         Patient's Goal:  1. UM F 31.81  Q.  “Pt will express the belief that even with his mental health diagnosis, he can learn to thrive in his life and work towards new goals and accomplishments that increase his self esteem    Notes:  Nato continued to be quiet in session and did not share his thoughts but he was supportive and encouraging of others.      Status After Intervention:  Unchanged    Participation Level: Active Listener and Interactive    Participation Quality: Appropriate, Attentive, Sharing, and Supportive      Speech:  normal      Thought Process/Content: Logical  Linear      Affective Functioning: Congruent and Flat      Mood: anxious and depressed      Level of consciousness:  Alert, Oriented x4, Attentive, and Preoccupied      Response to Learning: Able to verbalize current knowledge/experience, Able to retain information, Capable of insight, and Progressing to goal      Endings: None Reported    Modes of Intervention: Education, Support, Socialization, Exploration, and 
[facility]  Group Therapy  Treatment Plan Review    TREATMENT PLAN REVIEW REVIEW DATE: 4/30/2024     summary of Ongoing issues with Symptoms/Functional Impairments Indicating Need for Continued Stay:  Nato has had ongoing severe pain in his back and legs but he has made some progress in getting the help that he needs and he is going to get an injection that will hopefully help reduce his pain.  He is trying to maintain his emotions about his pain and how it impacts his functioning and he is getting the support he needs in group.  He continues to remain very isolated at home and is vulnerable to setbacks. We will continue to have him attend three days a week at this time and the hope is the injection will help him and help to increase his functioning.      TREATMENT & DISCHARGE PLANNING CHANGES    Modality or Intervention  Changes Pt will continue to attend three groups a day, three days a week.     Psychotropic Medication Changes No changes since last review.   Discharge  Planning or Target Date  Changes 6/30/2024   New Issues Will get injection next week for back.      TREATMENT TEAM SIGNATURE / DATE   I have participated in the development of this plan of treatment and agreed to its implementation.   Client Signature PRINTED Name                       Nato Small Date & Time       Family / Legal Representative Signature (If Applicable) PRINTED Name & Relationship     Date & Time   Therapist Signature PRINTED Name & Credential                            Colette Gray LCSW Date & Time       Therapist Signature PRINTED Name & Credential                         Brenda Murphy LCSW   Date & Time       Other Signature PRINTED Name & Credential     Date & Time   Other Signature PRINTED Name & Credential or Relationship     Date & Time       PHYSICIAN CERTIFICATION OF THE LEVEL OF CARE:  I certify that these outpatient behavioral health services are medically necessary to improve and 
Clarifying      Discipline Responsible: /Counselor      Signature:  Larissa Gray LCSW

## 2024-05-06 ENCOUNTER — HOSPITAL ENCOUNTER (OUTPATIENT)
Facility: HOSPITAL | Age: 64
Setting detail: RECURRING SERIES
Discharge: HOME | End: 2024-05-09
Payer: COMMERCIAL

## 2024-05-06 DIAGNOSIS — F10.21 ALCOHOL DEPENDENCE IN REMISSION (HCC): ICD-10-CM

## 2024-05-06 DIAGNOSIS — F12.21 CANNABIS USE DISORDER, SEVERE, IN EARLY REMISSION (HCC): ICD-10-CM

## 2024-05-06 DIAGNOSIS — F31.81 BIPOLAR II DISORDER, MOST RECENT EPISODE MAJOR DEPRESSIVE (HCC): Primary | ICD-10-CM

## 2024-05-06 DIAGNOSIS — F81.9 LEARNING DISABILITIES: ICD-10-CM

## 2024-05-06 PROCEDURE — 90853 GROUP PSYCHOTHERAPY: CPT

## 2024-05-06 PROCEDURE — 99213 OFFICE O/P EST LOW 20 MIN: CPT | Performed by: MIDWIFE

## 2024-05-06 RX ORDER — GABAPENTIN 100 MG/1
200 CAPSULE ORAL 3 TIMES DAILY
COMMUNITY

## 2024-05-06 NOTE — BH NOTE
Group Therapy Note    Date: 5/6/2024    Group Start Time: 11:00 AM  Group End Time: 11:50 AM  Group Topic: Psychotherapy    [facility] BEHAVIORAL TH OP    Marina, Larissa PASCUAL, LCSW        Group Therapy Note    Attendees: 12 scheduled    Focus of session was based on handout called “Ideas for Revitalizing and Energizing Yourself.”  We reviewed the list of coping skills that people can use to do the following: develop self-understanding, improve your mind, develop healthy attitudes, control your emotions, strengthen your body, improve personal relationships, lift your spirit, improve your environment, and live a healthy lifestyle.  We spoke about which coping skills they are using successfully and which ones they can see will benefit from using and how they can get started.         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  Pt will work on identifying if current emotional state is based on external or internal factors to help her identify an effective coping strategy    Notes:  Nato participated in group and he spoke about how it has been good for him to come here and learn new things.  He spoke about how he knows that it has helped him to keep his mind active.  We spoke about what he can do to keep learning about new things.  I suggested he look into getting cable and he can watch certain channels that can help him learn new things.      Status After Intervention:  Improved    Participation Level: Active Listener and Interactive    Participation Quality: Appropriate, Attentive, Sharing, and Supportive      Speech:  normal      Thought Process/Content: Logical  Linear      Affective Functioning: Congruent      Mood: anxious      Level of consciousness:  Alert, Oriented x4, and Attentive      Response to Learning: Able to verbalize current knowledge/experience and Able to retain information      Endings: None Reported    Modes of Intervention: Education, Support,

## 2024-05-06 NOTE — BH NOTE
Group Therapy Note    Date: 5/6/2024    Group Start Time: 10:00 AM  Group End Time: 10:50 AM  Group Topic: Psychotherapy    [facility] BEHAVIORAL TH OP    Larissa Gray, LCSW        Group Therapy Note    Attendees: 12 scheduled    Focus of session was a review of the weekend and helping group members see their wins that they can celebrate from the past several days.  We spoke about the things that occurred and choices that they had in front of them and helping everyone to see the positive impact that they had on their well being.  We also spoke about what may have been the setbacks and how they coped and what they can plan to do for the future to help build on relapse prevention.    Behavioral Health Daily Check In form revealed that patient is not experiencing SI/HI thoughts or plans, remaining abstinent from drugs and alcohol, and report's goal today of  \"to get help.\"        Patient's Goal:  1. UM F 31.81  O.   “Pt will verbalize acceptance of himself and his abilities and work to build on new strengths he can develop.”     Notes:  Nato participated in group with prompting.  He spoke about how he got his injection for his back last week and that the \"tingling has gone away but I am still sore where I got the shot.\"  He spoke about how he slept a lot this weekend but \"it was during the day and not at night.\"  We continue to work on developing his strengths and resources as he can right now.      Status After Intervention:  Unchanged    Participation Level: Active Listener and Interactive    Participation Quality: Appropriate, Attentive, Sharing, and Supportive      Speech:  normal      Thought Process/Content: Logical  Linear      Affective Functioning: Congruent      Mood: anxious and depressed      Level of consciousness:  Alert, Oriented x4, and Attentive      Response to Learning: Able to verbalize current knowledge/experience, Able to retain

## 2024-05-06 NOTE — BH NOTE
SOP PROGRESS NOTE     INTERVAL HISTORY/FINDINGS:    Nato feels \"pretty well.\" Had injections for sciatica three days ago, which haven't significantly relieved his pain yet. He states his mood is good and he hasn't had undue anxiety, elevated moods. He has been taking his medications without problems remembering or SEs, and he is happy with his dosages at this time. His appetite is good and he has been sleeping well. He is pleased to be in SOP and has no new concerns or problems to report.    MEDICATIONS:       Current Outpatient Medications   Medication Sig    amLODIPine (NORVASC) 10 MG tablet Take 1 tablet by mouth daily    lisinopril (PRINIVIL;ZESTRIL) 20 MG tablet Take 1 tablet by mouth daily    diclofenac (VOLTAREN) 75 MG EC tablet Take 1 tablet by mouth 2 times daily    ARIPiprazole (ABILIFY) 2 MG tablet Take 1 tablet by mouth daily    divalproex (DEPAKOTE) 500 MG DR tablet Take 1 tablet by mouth 2 times daily    escitalopram (LEXAPRO) 20 MG tablet Take 1 tablet by mouth daily    traZODone (DESYREL) 100 MG tablet Take 1 tablet by mouth nightly    naproxen (NAPROSYN) 500 MG tablet Take 1 tablet by mouth 2 times daily (with meals)    cetirizine (ZYRTEC) 10 MG tablet Take 1 tablet by mouth daily    acetaminophen (TYLENOL) 500 MG tablet Take 2 tablets by mouth 3 times daily    sucralfate (CARAFATE) 1 GM tablet      omeprazole (PRILOSEC) 40 MG delayed release capsule Take 1 capsule by mouth 2 times daily      No current facility-administered medications for this encounter.         MENTAL STATUS UPDATE: (Positives in Bold)  Appearance:   Neat   Disheveled  Odiferous   Appropriate  Orientation:   Time  Place  Person  Speech:   Coherent  Pressured  Garbled/Slurred  Loud   Soft  Mute  Memory:   Intact  Impaired (Recent  Remote)--Mild  Severe  Mood:   Euthymic  Depressed--mild  Anxious  Elated  Angry  Affect:    Appropriate  Inappropriate  Labile  Blunted  Flat--slight  Thought Content:   Logical/Appropriate  Paranoid

## 2024-05-14 ENCOUNTER — HOSPITAL ENCOUNTER (OUTPATIENT)
Facility: HOSPITAL | Age: 64
Setting detail: RECURRING SERIES
Discharge: HOME | End: 2024-05-17
Payer: COMMERCIAL

## 2024-05-14 NOTE — BH NOTE
[facility]  Group Therapy  Absence Note        Date of Service: 5/14/2024    Pt called and left a message that he would not be coming to group as his back was hurting.  Pt has rescheduled to attend 5/15/2024

## 2024-05-15 ENCOUNTER — HOSPITAL ENCOUNTER (OUTPATIENT)
Facility: HOSPITAL | Age: 64
Setting detail: RECURRING SERIES
Discharge: HOME | End: 2024-05-18
Payer: COMMERCIAL

## 2024-05-15 PROCEDURE — 90853 GROUP PSYCHOTHERAPY: CPT

## 2024-05-15 NOTE — BH NOTE
Group Therapy Note    Date: 5/15/2024    Group Start Time: 10:00 AM  Group End Time: 10:50 AM  Group Topic: Psychotherapy    [facility] BEHAVIORAL TH OP    Shameka Murphy, BRETT        Group Therapy Note    Attendees: 10  scheduled    Focus of session was based on information from “The Feeling Good Handbook” by Dr. Idris Vivar.   We spoke about questions to consider when we need to think about if we need to accept our thoughts and feelings, express our thoughts and feelings, or change them.  I went over the questions which included how long have I been feeling this way and may I be stuck, am I doing something constructive or am I simply brooding, are my thoughts and feelings realistic, and am I making myself unhappy about a situation that I cannot do anything about?  We spoke about how these questions can help lead us into a direction to process our thoughts and feelings and we spoke about how group members can get started on this and why it may be helpful to them.      Behavioral Health Daily Check In form revealed that pt is not experiencing SI/HI thoughts or plans, remaining abstinent from drugs and alcohol, and reports goal today to get help  Patient's Goal:  1UM F31.81 Q .  “Pt will express the belief that even with his mental health diagnosis, he can learn to thrive in his life and work towards new goals and accomplishments that increase his self-esteem.”      Notes:  Pt states he is still experiencing a lot of pain in the area where he received his shots.  The doctor messed up and put one in the wrong place and he continues to have pain.  He is not able to get his pain medication until next week.  We discussed calling this doctors office and reexplaining what happened and maybe they will give him something until his refill.  We discussed his presentation on the phone in dealing with this situation    Status After Intervention:

## 2024-05-15 NOTE — BH NOTE
Group Therapy Note    Date: 5/15/2024    Group Start Time: 11:00 AM  Group End Time: 11:50 AM  Group Topic: Psychotherapy    [facility] BEHAVIORAL TH OP    Shameka Murphy LCSW        Group Therapy Note    Attendees: 10 scheduled    Focus of session was based on the article “10 Signs you are Starting to Heal.”  We spoke about the following: you no longer dwell on negative thoughts from others, you no longer feel afraid to ask for help, you are starting to feel again, you no longer beat yourself up about everything, you experience more better days that bad ones, you are gaining more control over your life, you are regaining your appetite, you no longer rely on others to make you happy, and you look forward to the future.  We spoke about where group members are and what they are noticing are positive signs of recovery.           Patient's Goal:  1UM F31.81 M “Pt will identify what he can do to build up his self esteem, supportive relationships, and his personal empowerment to help increase confidence for discharge.”      Notes:  Pt listened to the group discussion and activity.He followed along with the reading of the 10 signs you are healing and was able to acknowledge that some of them applied to him at this time.  This was a positive experience for him to be able to  reflect on his progress    Status After Intervention:  Improved    Participation Level: Active Listener and Interactive    Participation Quality: Appropriate, Attentive, Sharing, and Supportive      Speech:  normal      Thought Process/Content: Logical      Affective Functioning: Congruent      Mood: euthymic      Level of consciousness:  Alert, Oriented x4, and Attentive      Response to Learning: Able to verbalize current knowledge/experience, Able to verbalize/acknowledge new learning, Able to retain information, Capable of insight, Able to change behavior, and Progressing to

## 2024-05-16 ENCOUNTER — APPOINTMENT (OUTPATIENT)
Facility: HOSPITAL | Age: 64
End: 2024-05-16
Payer: COMMERCIAL

## 2024-05-20 ENCOUNTER — HOSPITAL ENCOUNTER (OUTPATIENT)
Facility: HOSPITAL | Age: 64
Setting detail: RECURRING SERIES
Discharge: HOME | End: 2024-05-23
Payer: COMMERCIAL

## 2024-05-20 PROCEDURE — 90853 GROUP PSYCHOTHERAPY: CPT

## 2024-05-20 NOTE — BH NOTE
Group Therapy Note    Date: 5/20/2024    Group Start Time: 12:00 PM  Group End Time: 12:50 PM  Group Topic: Psychotherapy    [facility] BEHAVIORAL [facility] OP    Shameka Murphy LCSW        Group Therapy Note    Attendees: 12 scheduled    Focus of session was on what we do and say that leads to healthy functioning and what we do or say that can lead to mental anguish.  We spoke about how our thoughts easily interfere with our healthy functioning since we are always thinking and we have to be aware of the content and nature of our thoughts.  We spoke about how healthy functioning includes living moment to moment, focusing on the beauty of life, letting go, learning from mistakes, and focusing on what we can do.  Mental anguish is not having boundaries, taking on more responsibility than we need or can handle, focusing on what we can't do, living in the past or future, and can only focus on how life can be improved.         Patient's Goal:  1Um F31.81 M “Pt will identify what he can do to build up his self esteem, supportive relationships, and his personal empowerment to help increase confidence for discharge.”      Notes: Pt participated in the group activity and followed along with the worksheet.  He was attentive but had to get up a couple of times as his back was hurting.      Status After Intervention:  Unchanged    Participation Level: Active Listener    Participation Quality: Attentive      Speech:  normal      Thought Process/Content: Logical      Affective Functioning: Congruent      Mood: anxious      Level of consciousness:  Alert and Attentive      Response to Learning: Able to verbalize current knowledge/experience      Endings: None Reported    Modes of Intervention: Education, Support, Socialization, and Activity      Discipline Responsible: /Counselor      Signature:  Shameka Murphy LCSW

## 2024-05-20 NOTE — BH NOTE
Group Therapy Note    Date: 5/20/2024    Group Start Time: 10:00 AM  Group End Time: 10:50 AM  Group Topic: Psychotherapy    [facility] BEHAVIORAL TH OP    Shameka Murphy LCSW        Group Therapy Note    Attendees: 12 scheduled    Focus of session was a review of the weekend and helping group members see their wins that they can celebrate from the past several days.  We spoke about the things that occurred and choices that they had in front of them and helping everyone to see the positive impact that they had on their well being.  We also spoke about what may have been the setbacks and how they coped and what they can plan to do for the future to help build on relapse prevention.       Behavioral Health Daily Check In form revealed that pt is not experiencing SI/HI thoughts or plans, remaining abstinent from drugs and alcohol, and reports goal today to get help  Patient's Goal:  1Um F31.81Q .  “Pt will express the belief that even with his mental health diagnosis, he can learn to thrive in his life and work towards new goals and accomplishments that increase his self-esteem.”     Notes:  Pt listened to the group discussion.  He shared that he was able to do some work around the house.  He painted and racked leaves although his back is still hurting .  He feels that his leg is better now that he got the shot.  Pt acknowledged that he came today and was pleased that he did even with the incident last week.     Status After Intervention:  Improved    Participation Level: Active Listener and Interactive    Participation Quality: Appropriate, Attentive, and Sharing      Speech:  normal      Thought Process/Content: Logical      Affective Functioning: Congruent      Mood: euthymic      Level of consciousness:  Alert, Oriented x4, and Attentive      Response to Learning: Able to verbalize current knowledge/experience, Able to verbalize/acknowledge new

## 2024-05-20 NOTE — BH NOTE
Group Therapy Note    Date: 5/20/2024    Group Start Time: 11:00 AM  Group End Time: 11:50 AM  Group Topic: Psychotherapy    [facility] BEHAVIORAL TH OP    [facility]        Group Therapy Note    Attendees: 11 scheduled    Focus of session was based on information from [place] about the types of coping skills that we develop over lifetime.  We spoke about the need to have these different types and how they can help now at this phase of life.  We spoke about active skills which are solving problems, asking for help, goal planning, and reframing thoughts and beliefs.  Accommodative skills which are adjusting expectations and preferences to suit situations and people, emotional which are regulating emotional responses to stress, behavioral which is using healthy behaviors to manage stress, and cognitive which are mental activities that help manage stress.  We spoke about categories that may be lacking and what can be tried to build up these skills.       Patient's Goal:  1. UM F 31.81  K.  .  “Pt will identify what his tentative plans for discharge from program look like and what kind of services he can see will be beneficial to him    Notes:  Nato participated in group with prompting. He spoke about how he is trying to find ways to stay busy and we spoke about how he has a lot of down time and he agreed.  We spoke about how that leads him to think a lot about things and he is starting to recognize how he can over think things.  We spoke about how he can benefit from working on over thinking and recognize when he is making assumptions.      Status After Intervention:  Improved    Participation Level: Active Listener, Interactive, and Minimal    Participation Quality: Appropriate, Attentive, Sharing, and Supportive      Speech:  normal      Thought Process/Content: Logical  Linear      Affective Functioning: Congruent      Mood:

## 2024-05-21 ENCOUNTER — HOSPITAL ENCOUNTER (OUTPATIENT)
Facility: HOSPITAL | Age: 64
Setting detail: RECURRING SERIES
Discharge: HOME | End: 2024-05-24
Payer: COMMERCIAL

## 2024-05-21 PROCEDURE — 90853 GROUP PSYCHOTHERAPY: CPT

## 2024-05-21 NOTE — BH NOTE
Group Therapy Note    Date: 5/21/2024    Group Start Time: 10:00 AM  Group End Time: 10:50 AM  Group Topic: Psychotherapy    [facility] BEHAVIORAL [facility] OP    Larissa Gray, LCSW        Group Therapy Note    Attendees: 10 scheduled    Focus of session was about identifying certain self talk and how it impacts anger.  We spoke about the statements we make to ourselves and how it increases our anger and our irritation.  We went over the words should, shouldn't, must, ought, always, and need to.  We spoke about what we cannot control such as other people and what they do and what they believe in.  We spoke about challenges to the negative self talk that can include things like: you have the right to want things but others have the right to say no, you must accept some things in life, as is, and everyone has needs and priorities and others may not act the way we expect.  We spoke about possible statements of self-acceptance to help us manage these particular thoughts and challenges to help ourselves in these situations.     Behavioral Health Daily Check In form revealed that patient is not experiencing SI/HI thoughts or plans, remaining abstinent from drugs and alcohol, and report's goal today of  \"to get your help.\"       Patient's Goal:   1 UM F 31.81  O.  “Pt will verbalize acceptance of himself and his abilities and work to build on new strengths he can develop.”    Notes:  Nato participated in group with prompting. He spoke about how he is not sleeping well due to the pain in his back and that he is supposed to get his pain medication possibly tomorrow.  We spoke about how it would benefit him to wait and see if that will help him with the pain and the sleep as well.  He is aware that he has a lot he can continue to work on in regards to managing anger.      Status After Intervention:  Unchanged    Participation Level: Active Listener and 
                                                                      Group Therapy Note    Date: 5/21/2024    Group Start Time: 12:00 PM  Group End Time: 12:50 PM  Group Topic: Psychotherapy    [facility] BEHAVIORAL TH OP    Larissa Gray, [facility]        Group Therapy Note    Attendees: 10 scheduled    Focus of session was on information from the book “The Daily Stoic” by Bib Wills. I shared the thoughts of what can pollute and clog the minds proper functioning and what we consider toxic to us personally.  I shared the seven clear functions of the mind which includes: choice (to do and think right), refusal (of temptation), yearning (to be better), repulsion (of negativity, of bad influences, of what isn't true), preparation (for what lies ahead or whatever may happen), purpose (our guiding principle) and assent (to be free of deception about what's inside and outside or control and be ready to accept the latter).  We spoke about how these functions and awareness of them can help in short and long term recovery.         Patient's Goal:  1. UM F 31.81 U.  “Pt will share with group strategies that he will use when he finds himself in the “red zone” of anger to help him slow down and think about his response    Notes:  Nato participated in group with prompting.  He spoke about how he is doing what he can to stay focused on preparing for what he wants to do for his future.  He spoke about how he knows that he wants to get a car so he can expand his options for things he can start to do.      Status After Intervention:  Unchanged    Participation Level: Active Listener and Interactive    Participation Quality: Appropriate, Attentive, Sharing, and Supportive      Speech:  normal      Thought Process/Content: Logical  Linear      Affective Functioning: Congruent      Mood: anxious      Level of consciousness:  Alert, Oriented x4, Attentive, and Preoccupied      Response to Learning: Able to verbalize current 
Shameka Murphy, [facility]

## 2024-05-23 ENCOUNTER — HOSPITAL ENCOUNTER (OUTPATIENT)
Facility: HOSPITAL | Age: 64
Setting detail: RECURRING SERIES
Discharge: HOME | End: 2024-05-26
Payer: COMMERCIAL

## 2024-05-23 PROCEDURE — 90853 GROUP PSYCHOTHERAPY: CPT

## 2024-05-23 NOTE — BH NOTE
Group Therapy Note    Date: 5/23/2024    Group Start Time: 11:00 AM  Group End Time: 11:50 AM  Group Topic: Psychotherapy    [facility] BEHAVIORAL TH OP    Shameka Murphy LCSW        Group Therapy Note    Attendees: 12 scheduled     Focus of session was based on “How to Love Yourself” from Sherrie Collins.  We spoke about the different strategies and they  include; Stop all Criticism, Don't Scare Yourself, Be Gentle and Kind and Patient, Be Kind to Your Mind, Praise Yourself, Support Yourself, Be [place] to your Negative, Take Care of Your Body, Mirror work, and Love Yourself and Do it Now.”  We spoke about what strategies we can use to help build up this important skill and how to have self love.         Patient's Goal:  1UM F31.81 R .   “Pt will work to replace black and white thinking with the ability to tolerate ambiguity and complexity in people and issues.”      Notes:  Pt listened to the group activity and discussion.  Pt responded when prompted but was quiet throughout the session.  He was cooperative and attentive as he followed along with the activity    Status After Intervention:  Unchanged    Participation Level: Active Listener    Participation Quality: Appropriate and Attentive      Speech:  normal      Thought Process/Content: Logical      Affective Functioning: Congruent      Mood: euthymic      Level of consciousness:  Alert and Attentive      Response to Learning: Able to verbalize current knowledge/experience      Endings: None Reported    Modes of Intervention: Education, Support, Socialization, Exploration, Clarifying, Problem-solving, and Activity      Discipline Responsible: /Counselor      Signature:  Shameka Murphy LCSW

## 2024-05-23 NOTE — BH NOTE
Group Therapy Note    Date: 5/23/2024    Group Start Time: 12:00 PM  Group End Time: 12:50 PM  Group Topic: Psychotherapy    [facility] BEHAVIORAL TH OP    Larissa Gray, [facility]        Group Therapy Note    Attendees: 12 scheduled    Focus of session was on information from Dr. Tonio Parisi on urge surfing and how to use mindfulness to help cope with impulses and urges.  I shared with group about how urges are like waves and that urges usually peak about 20 to 30 minutes if we let them.  If we resist them, then we tend to worsen them and they also last longer.  I shared with group about how to surf an urge by practicing patience and acceptance and to practice breathing and to even visualize the wave to help cope and work through the urges and sensations and help progress towards a healthier habit and reaction.        Patient's Goal:  1. UM F 31.81 T.  “Pt will identify what she can do to build up her self-esteem, supportive relationships, and her personal empowerment to help increase confidence for discharge    Notes:  Nato participated in group with prompting.  He spoke about how he is working on using this but he has never really thought about it.  He is trying to find ways to let out his anger and frustration in ways that will be beneficial to him rather than \"what I have always done which is punch walls and damage my things.\"      Status After Intervention:  Unchanged    Participation Level: Active Listener and Interactive    Participation Quality: Appropriate, Attentive, Sharing, and Supportive      Speech:  normal      Thought Process/Content: Logical  Linear      Affective Functioning: Congruent and Flat      Mood: anxious      Level of consciousness:  Alert, Oriented x4, and Attentive      Response to Learning: Able to verbalize current knowledge/experience, Able to retain information, and Capable of insight      Endings: None

## 2024-05-23 NOTE — BH NOTE
Group Therapy Note    Date: 5/23/2024    Group Start Time: 10:00 AM  Group End Time: 10:50 AM  Group Topic: Psychotherapy    [facility] BEHAVIORAL TH OP    Marina, Larissa S, LCSW        Group Therapy Note    Attendees: 12 scheduled    Focus of session was on conflict resolution and strategies to help us keep calm in a conflict with others.  I shared how conflicts can increase the production of cortisol and adrenaline and how that prepares us to either take flight or fight.  We spoke about how conflicts can be over something pretty minor to values and very important issues in our lives.  I shared how the increase in hormones affects us when we need to be clear minded and aware but those hormones can create a disorientation and confusion.  I shared with group some strategies to help us stay calm in a conflict and they included taking deep breaths which actually interferes with the production of these hormones, focusing on body and where we can work to relax it, listen carefully and ask open ended questions, lower our voices, and work to let go and agree to disagree with others.   We spoke about with whom group members can practice these strategies.    Behavioral Health Daily Check In form revealed that patient is not experiencing SI/HI thoughts or plans, remaining abstinent from drugs and alcohol, and report's goal today of  \"to get help.\"      Patient's Goal:   1 Um F 31.81  W.  “Pt will identify and verbalize in group something that she is thinking of that she is afraid of and work to overcome that fear and face what it is with support of group    Notes:  Nato participated in group with prompting.  He spoke about how he does know that \"I can hold onto things that are probably not helping me.\"  He spoke about how he knows that he can stay angry and \"I have for years.\"  We spoke about issues from the past and present that would impact his mental health

## 2024-05-28 ENCOUNTER — HOSPITAL ENCOUNTER (OUTPATIENT)
Facility: HOSPITAL | Age: 64
Setting detail: RECURRING SERIES
Discharge: HOME | End: 2024-05-31
Payer: COMMERCIAL

## 2024-05-28 PROCEDURE — 90853 GROUP PSYCHOTHERAPY: CPT

## 2024-05-28 NOTE — BH NOTE
Group Therapy Note    Date: 5/28/2024    Group Start Time: 11:00 AM  Group End Time: 11:50 AM  Group Topic: Psychotherapy    [facility] BEHAVIORAL TH OP    Shameka Murphy, BRETT        Group Therapy Note    Attendees: 11 scheduled    Focus of session was based on the quote of “Respect yourself enough to walk away from anything that no longer serves you, grows you, or makes you happy as well as the quote of “You will find it necessary to let things go; simply for the reason that they are heavy.”  Group members were asked to share what they need to, or who they need to walk away from for their own recovery.  We spoke about the struggles of letting go and how the thoughts or emotions tend to come back.  I spoke with group members that “letting go” does not mean we won't have to work on letting it go over and over and that it is okay to pop back in our minds and hearts.  I asked group members what or who they need to walk away from starting today.         Patient's Goal:  1UM F31.81 M “Pt will identify what he can do to build up his self esteem, supportive relationships, and his personal empowerment to help increase confidence for discharge.”      Notes:  Pt listened to the group discussion and activity.  He stated that he is feeling somewhat better now that he had gotten his medication.  Pt remained alert and cooperative as he observed the rest of the group    Status After Intervention:  Improved    Participation Level: Active Listener and Interactive    Participation Quality: Appropriate and Attentive      Speech:  normal      Thought Process/Content: Logical      Affective Functioning: Congruent      Mood: euthymic      Level of consciousness:  Alert and Attentive      Response to Learning: Able to verbalize current knowledge/experience, Able to verbalize/acknowledge new learning, Able to retain information, Capable of insight, Able to change

## 2024-05-28 NOTE — BH NOTE
Group Therapy Note    Date: 5/28/2024    Group Start Time: 10:00 AM  Group End Time: 10:50 AM  Group Topic: Psychotherapy    [facility] BEHAVIORAL TH OP    Larissa Gray, LCSW        Group Therapy Note    Attendees: 11 scheduled    Focus of session was a review of the weekend and helping group members see their wins that they can celebrate from the past several days.  We spoke about the things that occurred and choices that they had in front of them and helping everyone to see the positive impact that they had on their well being.  We also spoke about what may have been the setbacks and how they coped and what they can plan to do for the future to help build on relapse prevention.     Behavioral Health Daily Check In form revealed that patient is not experiencing SI/HI thoughts or plans, remaining abstinent from drugs and alcohol, and report's goal today of   \"to get help.\"      Patient's Goal:  1. UM F 31.81  T.  Pt will verbalize an increased tolerance for uncomfortable emotions and showing improvement in accepting them and allowing them to peak and lessen over time.”     Notes:  Nato participated in group with prompting.  He reports that his pain is better today and he has been helped by the pain medications. He spoke about how he is trying to stay focused on doing good things for himself. He spoke about how he took care of his sisters dog over the past two days.      Status After Intervention:  Unchanged    Participation Level: Active Listener and Interactive    Participation Quality: Appropriate, Attentive, Sharing, and Supportive      Speech:  normal      Thought Process/Content: Logical  Linear      Affective Functioning: Congruent      Mood: anxious      Level of consciousness:  Alert, Oriented x4, and Preoccupied      Response to Learning: Able to verbalize current knowledge/experience, Able to retain information, Capable of insight, and

## 2024-05-30 ENCOUNTER — HOSPITAL ENCOUNTER (OUTPATIENT)
Facility: HOSPITAL | Age: 64
Setting detail: RECURRING SERIES
End: 2024-05-30
Payer: COMMERCIAL

## 2024-05-30 PROCEDURE — 90853 GROUP PSYCHOTHERAPY: CPT

## 2024-05-30 NOTE — BH NOTE
Group Therapy Note    Date: 5/30/2024    Group Start Time: 10:00 AM  Group End Time: 10:30 AM  Group Topic: Psychotherapy    [facility] BEHAVIORAL [facility] OP    Shameka Murphy LCSW        Group Therapy Note    Attendees: 11 scheduled    Focus of session was based on “How to Love Yourself” from Sherrie Collins.  We spoke about the different strategies and they  include; Stop all Criticism, Don't Scare Yourself, Be Gentle and Kind and Patient, Be Kind to Your Mind, Praise Yourself, Support Yourself, Be [place] to your Negative, Take Care of Your Body, Mirror work, and Love Yourself and Do it Now.”  We spoke about what strategies we can use to help build up this important skill and how to have self love.    Behavioral Health Daily Check In form revealed that pt is not experiencing SI/HI thoughts or plans, remaining abstinent from drugs and alcohol, and reports goal today to get help    Patient's Goal:  1UM F31.81 N .  “Pt will work on identifying if current emotional state is based on external or internal factors to help her identify an effective coping strategy.”     Notes:  Pt listened to the group discussion and concerns of other members.  He was attentive and cooperative but did not engage unless prompted.  Pt spoke to the other members at the beginning of the group.     Status After Intervention:  Unchanged    Participation Level: Active Listener    Participation Quality: Attentive      Speech:  normal      Thought Process/Content: Logical      Affective Functioning: Congruent      Mood: anxious and depressed      Level of consciousness:  Preoccupied      Response to Learning: Able to verbalize current knowledge/experience      Endings: None Reported    Modes of Intervention: Education, Support, Socialization, Exploration, and Clarifying      Discipline Responsible: /Counselor      Signature:  Shameka Murphy LCSW

## 2024-05-30 NOTE — BH NOTE
Group Therapy Note    Date: 5/30/2024    Group Start Time: 11:00 AM  Group End Time: 11:50 AM  Group Topic: Psychotherapy    [facility] BEHAVIORAL [facility] OP    Shameka Murphy LCSW        Group Therapy Note    Attendees: 11 scheduled    Focus of session was on effective ways to control anxiety.  We spoke about how these specific skills can have a very immediate effect on stress and anxiety and we spoke about how we can motivate to use them.  We spoke about the following skills; dealing with problems on the spot, strong, confident relationships, slowing down, taking one thing at a time, coping with ruts, divide problems up, using past experiences, and building relaxation into our daily lives.  We spoke about which skill we will start to incorporate into our daily use       Patient's Goal:  1UM F31.81 K .  “Pt will identify what his tentative plans for discharge from program look like and what kind of services he can see will be beneficial to him.”     Notes:  Pt listened to the group activity and engaged with others when promoted.  Pt stated that he did not sleep last night.  He fell asleep during the day out of boredom. He knows he needs to find some type of activity to keep him busy and awake. We again discussed options and strategies but he is slow to commit to anything at this time.      Status After Intervention:  Unchanged    Participation Level: Minimal    Participation Quality: Attentive      Speech:  normal      Thought Process/Content: Perseverating      Affective Functioning: Flat      Mood: angry and anxious      Level of consciousness:  Drowsy      Response to Learning: Able to verbalize current knowledge/experience      Endings: None Reported    Modes of Intervention: Education, Support, Socialization, Exploration, Clarifying, and Problem-solving      Discipline Responsible: /Counselor      Signature:  Shameka Murphy  LCSW

## 2024-06-03 ENCOUNTER — HOSPITAL ENCOUNTER (OUTPATIENT)
Facility: HOSPITAL | Age: 64
Setting detail: RECURRING SERIES
Discharge: HOME | End: 2024-06-06
Payer: COMMERCIAL

## 2024-06-03 PROCEDURE — 90853 GROUP PSYCHOTHERAPY: CPT

## 2024-06-03 NOTE — BH NOTE
Group Therapy Note    Date: 6/3/2024    Group Start Time: 11:00 AM  Group End Time: 11:50 AM  Group Topic: Psychotherapy    [facility] BEHAVIORAL [facility] OP    Larissa Gray, LCSW        Group Therapy Note    Attendees:  11 scheduled    Focus of session was based on article “How to Calm Yourself in a Stressful Situation” by Rene Rivera.  The article focused on the flight or fight response and how it has been a vital reaction in life-or-death stressful situations, but the response occurs for all stressful events now.  Our body still reacts as if we are in danger.    We spoke about how it is not a cognitive choice but a biological decision our nervous system is making.  We discussed the tips from the article to help manage these stress responses which include: get a read on stress early, reframe what is happening, be present in the now, and commit to learning.         Patient's Goal:  1 UM F 31.81  T.  Pt will verbalize an increased tolerance for uncomfortable emotions and showing improvement in accepting them and allowing them to peak and lessen over time.”     Notes:  Nato participated in group with prompting.  He spoke about how he continues to struggle with chronic pain, lack of sleep, and struggles with managing activities in that he does not have enough to stimulate him during the day.  He struggles with following through with suggestions being made.      Status After Intervention:  Unchanged    Participation Level: Active Listener and Interactive    Participation Quality: Appropriate, Attentive, Sharing, and Supportive      Speech:  normal      Thought Process/Content: Logical  Linear      Affective Functioning: Congruent and Flat      Mood: anxious, depressed, and irritable      Level of consciousness:  Alert, Oriented x4, Attentive, and Preoccupied      Response to Learning: Able to verbalize current knowledge/experience      Endings: None 
Worker/Counselor      Signature:  Shameka Murphy [facility]

## 2024-06-04 ENCOUNTER — HOSPITAL ENCOUNTER (OUTPATIENT)
Facility: HOSPITAL | Age: 64
Setting detail: RECURRING SERIES
Discharge: HOME | End: 2024-06-07
Payer: COMMERCIAL

## 2024-06-04 PROCEDURE — 99214 OFFICE O/P EST MOD 30 MIN: CPT | Performed by: PSYCHIATRY & NEUROLOGY

## 2024-06-04 PROCEDURE — 90853 GROUP PSYCHOTHERAPY: CPT

## 2024-06-04 RX ORDER — MIRTAZAPINE 15 MG/1
15 TABLET, FILM COATED ORAL NIGHTLY
Qty: 30 TABLET | Refills: 2 | Status: SHIPPED | OUTPATIENT
Start: 2024-06-04

## 2024-06-04 NOTE — BH NOTE
Group Therapy Note    Date: 6/4/2024    Group Start Time: 11:00 AM  Group End Time: 11:50 AM  Group Topic: Psychotherapy    [facility] BEHAVIORAL [facility] OP    Shameka Murphy LCSW        Group Therapy Note    Attendees: 9 scheduled    Focus of session was on Self Respect Essentials.  We spoke about why self-respect is so important to recovery in mental health and substance abuse.  We discussed the ideas of having a sense of strengths and weaknesses, that we are entitled to be treated with respect, acceptance of self, sense of self is strong enough to withstand bumps and bruises, that we can feel worthwhile even when we don't know something, and that we don't bolster our self-respect at the expense of others.  Group members were asked to share their thoughts and feelings about this and how they can increase their ability to respect themselves.         Patient's Goal:  1UM F31.81 K “Pt will identify what his tentative plans for discharge from program look like and what kind of services he can see will be beneficial to him.”     Notes:  Pt attended group but got up frequently as his back was hurting.  He attempted to listen to the others and participate when he could  Status After Intervention:  Unchanged    Participation Level: Minimal    Participation Quality: Appropriate and Attentive      Speech:  normal      Thought Process/Content: Logical      Affective Functioning: Flat      Mood: irritable      Level of consciousness:  Preoccupied      Response to Learning: Able to verbalize current knowledge/experience      Endings: None Reported    Modes of Intervention: Education, Support, Socialization, Exploration, Clarifying, and Problem-solving      Discipline Responsible: /Counselor      Signature:  Shameka Murphy LCSW

## 2024-06-04 NOTE — BH NOTE
Group Therapy Note    Date: 6/4/2024    Group Start Time: 10:00 AM  Group End Time: 10:50 AM  Group Topic: Psychotherapy    [facility] BEHAVIORAL TH OP    Larissa Gray, LCSW        Group Therapy Note    Attendees: 9 scheduled    Focus of session was on identifying and challenging unhelpful and negative thinking patterns.  We discussed the importance of accepting the facts of situations and not what we are interpreting from them. I discussed with patient thinking processes that include self-blame which is accepting responsibility for all that is wrong, whether it is our fault or not.  We also reviewed the thinking patterns of all or nothing thinking, jumping to conclusions, overgeneralizing, making assumptions, and labeling.  Patient worked to identify the thought pattern that they get caught up in and how the thinking can be disputed and challenge what is not true and replace it with truthful and rational thoughts.     Behavioral Health Daily Check In form revealed that patient is not experiencing SI/HI thoughts or plans, remaining abstinent from drugs and alcohol, and report's goal today of  \"to get help.\"      Patient's Goal:  1. UM F 31.81  U.  “Pt will share with group strategies that he will use when he finds himself in the “red zone” of anger to help him slow down and think about his response    Notes:  Nato participated in group with prompting and direct questions.  He was frustrated due to his lack of pain.  He spoke about how knows that he is tired and bored most of the time but is struggling with finding the positives.  He was accurate in what he can see his cognitive distortions are and that he is often labeling himself and viewing himself in very negative ways.      Status After Intervention:  Unchanged    Participation Level: Active Listener, Interactive, and Minimal    Participation Quality: Appropriate, Attentive, Sharing, and

## 2024-06-04 NOTE — PROGRESS NOTES
SOP PROGRESS NOTE    INTERVAL HISTORY/FINDINGS:  He states he's been feeling \"not so good\" lately, and that he's been more depressed/dysphoric. He's also not sleeping well at all, despite taking the Trazodone. It's causing some cognitive clouding the next AM (at 200 mg), and isn't helping him sleep. His N/V functioning has been slightly worse as well--energy and motivation are low, he's more anhedonic, and he's not eating well, either. He denies that there's any particular triggers for this, but he has nothing at all to do at home now, and that's undoubtedly impacting him. No med SE's noted except with the trazodone. We discussed Rx options and I'll change the Trazodone to Remeron, starting at 15 mg QHS (help with mood, sleep, and appetite). The increased VPA hasn't caused any slowing or ataxia at all. He's still not using any THC, which he's also proud of. No hypomania or mood lability at all--he's been very stable by his report. Coming to [facility]OP really helps him, emotionally. He denies any SI or hopelessness.      MEDICATIONS:  Current Outpatient Medications   Medication Sig    gabapentin (NEURONTIN) 100 MG capsule Take 2 capsules by mouth 3 times daily. Max Daily Amount: 600 mg    diclofenac (VOLTAREN) 75 MG EC tablet TAKE 1 TABLET TWICE A DAY.    amLODIPine (NORVASC) 10 MG tablet Take 1 tablet by mouth daily    lisinopril (PRINIVIL;ZESTRIL) 20 MG tablet Take 1 tablet by mouth daily    ARIPiprazole (ABILIFY) 2 MG tablet Take 1 tablet by mouth daily    divalproex (DEPAKOTE) 500 MG DR tablet Take 1 tablet by mouth 2 times daily    escitalopram (LEXAPRO) 20 MG tablet Take 1 tablet by mouth daily    traZODone (DESYREL) 100 MG tablet Take 1 tablet by mouth nightly    cetirizine (ZYRTEC) 10 MG tablet Take 1 tablet by mouth daily    acetaminophen (TYLENOL) 500 MG tablet Take 2 tablets by mouth 3 times daily    omeprazole (PRILOSEC) 40 MG delayed release capsule Take 1 capsule by mouth 2 times daily     No current

## 2024-06-05 RX ORDER — ESCITALOPRAM OXALATE 20 MG/1
20 TABLET ORAL DAILY
Qty: 10 TABLET | Refills: 0 | Status: SHIPPED | OUTPATIENT
Start: 2024-06-05

## 2024-06-06 ENCOUNTER — HOSPITAL ENCOUNTER (OUTPATIENT)
Facility: HOSPITAL | Age: 64
Setting detail: RECURRING SERIES
Discharge: HOME | End: 2024-06-09
Payer: COMMERCIAL

## 2024-06-06 PROCEDURE — 90853 GROUP PSYCHOTHERAPY: CPT

## 2024-06-06 NOTE — BH NOTE
Group Therapy Note    Date: 6/6/2024    Group Start Time: 10:00 AM  Group End Time: 10:50 AM  Group Topic: Process Group - Outpatient    [facility] BE[facility]AVIOR[facility] OP    Shameka Murphy LCSW        Group Therapy Note    Attendees: 7 scheduled    Focus of session was a discussion on goal planning and was based on handout “Creating the Life that you Want: Ten Steps to Accomplishing a Goal.”  We spoke about those steps and being aware of the things you need to give up and avoid in order achieve the goal as well as what you need to start doing. We reviewed ideas for goals for lifetime, a 5 Year plan, and daily goals.  We spoke about financial, physical, social, personal, and educational/vocational.  We went over goals that are most important right now and how to get started.    Behavioral Health Daily Check In form revealed that pt is not experiencing SI/HI thoughts or plans, remaining abstinent from drugs and alcohol, and reports goal today, thanks for helping me       Patient's Goal:  1Um F31.81 O .  “Pt will verbalize acceptance of himself and his abilities and work to build on new strengths he can develop.”    Notes:  Pt shared that he tried to keep himself busy and awake yesterday by painting around the house.  He was able to stay awake but went to bed at 6:30 pm and then woke up at 10:30 pm.  He stated he took his new medication and slept well but then has been up all night. Pt finds it hard to stay awake as he is bored and has not interaction or stimulation.  He is having difficulty finding activities that he renata participate in . He [place].      Status After Intervention:  Unchanged    Participation Level: Active Listener and Interactive    Participation Quality: Appropriate, Attentive, and Sharing      Speech:  normal      Thought Process/Content: Logical      Affective Functioning: Flat      Mood: anxious 
                                                                      Group Therapy Note    Date: 6/6/2024    Group Start Time: 12:00 PM  Group End Time: 12:50 PM  Group Topic: Psychotherapy    [facility]IOR[facility] OP    Marina, Larissa [facility]CUAL[facility]        Group Therapy Note    Attendees: 9 scheduled    Focus of session was from the book “Atomic Habits” by Laurent Donovan.  We spoke about the idea of develop a habit tracker and how it can be helpful with starting and maintaining habits that will stabilize and maintain healthy mental health.  We spoke about the habits that patient has wanted to start and the issues or excuses that are getting in the way.  We spoke about the ways a habit tracker can help get a person started on a healthy habit or developing a habit of avoidance.  We discussed what habit pt wants to start or stop and how a tracker can be beneficial.         Patient's Goal:  1. UM F 31.81  W.  “Pt will develop the skill of recognizing when he may be jumping to conclusions or engaged in mind reading about what others think or believe and how it is impacting his mood symptoms    Notes:  Nato participated in group with prompting.  He spoke about how he is struggling with sleep and yesterday he slept from 6 pm to 10 pm and was up all night.  He spoke about how he knows that he has to work on this but he is not sure what else to do.  We focused on what he can do to try and stay up until later in the evening.  He agreed he has to push through the frustration and urge to go to bed early.      Status After Intervention:  Unchanged    Participation Level: Active Listener and Interactive    Participation Quality: Appropriate, Attentive, Sharing, and Supportive      Speech:  normal      Thought Process/Content: Logical  Linear      Affective Functioning: Congruent and Flat      Mood: anxious      Level of consciousness:  Alert, Oriented x4, Attentive, and Preoccupied      Response to Learning: Able to verbalize current 
Worker/Counselor      Signature:  [facility]ame[facility]

## 2024-06-10 ENCOUNTER — HOSPITAL ENCOUNTER (OUTPATIENT)
Facility: HOSPITAL | Age: 64
Setting detail: RECURRING SERIES
Discharge: HOME | End: 2024-06-13
Payer: COMMERCIAL

## 2024-06-10 PROCEDURE — 90853 GROUP PSYCHOTHERAPY: CPT

## 2024-06-10 NOTE — BH NOTE
Group Therapy Note    Date: 6/10/2024    Group Start Time: 12:00 PM  Group End Time: 12:50 PM  Group Topic: Psychotherapy    [facility] BEHAVIORAL TH OP    Shameka Murphy LCSW        Group Therapy Note    Attendees: 12 scheduled     Focus of session was a discussion on goal planning and was based on handout “Creating the Life that you Want: Ten Steps to Accomplishing a Goal.”  We spoke about those steps and being aware of the things you need to give up and avoid in order achieve the goal as well as what you need to start doing. We reviewed ideas for goals for lifetime, a 5 Year plan, and daily goals.  We spoke about financial, physical, social, personal, and educational/vocational.  We went over goals that are most important right now and how to get started.        Patient's Goal:  1Um F31.81 R Pt will work to replace black and white thinking with the ability to tolerate ambiguity and complexity in people and issues.”      Notes:  Pt listened to the group activity and the reading of the worksheet.  He was attentive and cooperative while observing the group.  He wants to keep working toward finding activities that he can participate in to help him stay awake and not take naps.Pt was given a list of possible activities       Status After Intervention:  Unchanged    Participation Level: Active Listener    Participation Quality: Appropriate and Attentive      Speech:  normal      Thought Process/Content: Logical      Affective Functioning: Congruent      Mood: euthymic      Level of consciousness:  Alert and Oriented x4      Response to Learning: Able to verbalize current knowledge/experience, Able to verbalize/acknowledge new learning, Able to retain information, and Capable of insight      Endings: None Reported    Modes of Intervention: Education, Support, Socialization, Exploration, Clarifying, Problem-solving, and Activity      Discipline

## 2024-06-10 NOTE — BH NOTE
Group Therapy Note    Date: 6/10/2024    Group Start Time: 11:00 AM  Group End Time: 11:50 AM  Group Topic: Psychotherapy    [facility] BEHAVIORAL [facility]    Larissa Gray[facility]        Group Therapy Note    Attendees: 12 scheduled    Focus of session was on information from article on how complaining rewires our brain for negativity and how to break that habit.  Group members were asked to share their thoughts about what it is like for them to be around someone who is consistently complaining and what that may do to them when they are consistently complaining.  We spoke about how we can start to practice of not complaining by catching ourselves when we are doing it and also recognizing when others may be doing it around us.  I shared how we can't really complain and be grateful at the same time, so focus on being grateful.  I asked group members to share their awareness of what they may be complaining about right now the most.        Patient's Goal:  1. UM F 31.81  T.  “Pt will verbalize an increased tolerance for uncomfortable emotions and showing improvement in accepting them and allowing them to peak and lessen over time.”     Notes:  Nato participated in group well with prompting.  He spoke about how he is worried about how he is using pot again.  We spoke about the biggest issue he has right now is boredom.  We spoke about the option of getting a car and what he will do with it.  He was not sure how that would open up doors for him and we spoke about possibly volunteering and he was not sure that he wanted to do that.      Status After Intervention:  Unchanged    Participation Level: Active Listener and Interactive    Participation Quality: Appropriate, Attentive, Sharing, and Supportive      Speech:  normal      Thought Process/Content: Logical  Linear      Affective Functioning: Congruent      Mood: anxious      Level of consciousness:  Alert,

## 2024-06-10 NOTE — BH NOTE
Group Therapy Note    Date: 6/10/2024    Group Start Time: 10:00 AM  Group End Time: 10:50 AM  Group Topic: Psychotherapy    [facility] BEHAVIOR[facility] TH OP    Shameka Murphy LCSW        Group Therapy Note    Attendees: 12 scheduled    Focus of session was a review of the weekend and helping group members see their wins that they can celebrate from the past several days.  We spoke about the things that occurred and choices that they had in front of them and helping everyone to see the positive impact that they had on their well being.  We also spoke about what may have been the setbacks and how they coped and what they can plan to do for the future to help build on relapse prevent      Behavioral Health Daily Check In form revealed that pt is not experiencing SI/HI thoughts or plans, remaining abstinent from drugs and alcohol, and reports goal today to get help  Patient's Goal:  1Um F31.81 O .  “Pt will verbalize acceptance of himself and his abilities and work to build on new strengths he can develop.”      Notes:  Pt shared with the group that his younger sister came to visit this weekend.  He enjoyed this and they were able [place] spend time [place]  She was able to take him to [facility] and that was helpful for pt.      Status After Intervention:  Improved    Participation Level: Active Listener and Interactive    Participation Quality: Appropriate, Attentive, and Sharing      Speech:  normal      Thought Process/Content: Logical      Affective Functioning: Congruent      Mood: euthymic      Level of consciousness:  Alert and Attentive      Response to Learning: Able to verbalize current knowledge/experience, Able to verbalize/acknowledge new learning, Able to retain information, Capable of insight, Able to change behavior, and Progressing to goal      Endings: None Reported    Modes of Intervention: Education, Support,

## 2024-06-11 ENCOUNTER — HOSPITAL ENCOUNTER (OUTPATIENT)
Facility: HOSPITAL | Age: 64
Setting detail: RECURRING SERIES
Discharge: HOME | End: 2024-06-14
Payer: COMMERCIAL

## 2024-06-11 PROCEDURE — 90853 GROUP PSYCHOTHERAPY: CPT

## 2024-06-11 NOTE — BH NOTE
Group Therapy Note    Date: 6/11/2024    Group Start Time: 12:00 PM  Group End Time: 12:50 PM  Group Topic: Psychotherapy    [facility] BEHAVIORAL [facility] OP    Larissa Gray LCSW        Group Therapy Note    Attendees: 9 scheduled    Focus of session was based on handout titled “Definitions of Recovery.”  We spoke about the qualities that are valued and needed in recovery and they include determination, perseverance, fortitude, resilience, strength, belief, open-mindedness, insight, honesty, and self-compassion.  We spoke about the definitions of these qualities and how they can be developed to help increase their long term recovery and their ability to be successful in it.         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  Pt will work on identifying if current emotional state is based on external or internal factors to help him identify an effective coping strategy.”     Notes:  Nato participated in group with prompting.  He spoke about how he is \"determined to quit smoking pot.\"  We spoke about why he wants to do that and he said \"so I can save money and put it towards a car.\"  We discussed what he really wants for himself to make sure he is not trying to make others happy.      Status After Intervention:  Unchanged    Participation Level: Active Listener and Interactive    Participation Quality: Appropriate, Attentive, Sharing, and Supportive      Speech:  normal      Thought Process/Content: Logical  Linear      Affective Functioning: Congruent      Mood: anxious and depressed      Level of consciousness:  Alert, Oriented x4, Attentive, and Preoccupied      Response to Learning: Able to verbalize current knowledge/experience      Endings: None Reported    Modes of Intervention: Education, Support, Socialization, Exploration, and Clarifying      Discipline Responsible: /Counselor      Signature:  Larissa Gray LCSW

## 2024-06-11 NOTE — BH NOTE
Group Therapy Note    Date: 6/11/2024    Group Start Time: 11:00 AM  Group End Time: 11:50 AM  Group Topic: Psychotherapy    [facility] BEHAVIORAL TH OP    Shameka Murphy, BRETT        Group Therapy Note    Attendees: 9  scheduled    Focus of session was based on a handout from [employer] on “How to Stop Taking Things Personally.”  We spoke about how we often get triggered by other people's attitudes and what we can do to manage internalizing it.  We spoke about accepting and realizing that other people's rudeness or attitude is often not about us.  We spoke about looking for small slivers of truth in what people are telling us when it is critical, and how we can take a different perspective.  We spoke about one of the biggest skills to cope with other people's attitudes or behaviors aimed towards you is that we have to remember we are not defined by what other people tell us but our self-worth is based on what we tell ourselves.  We spoke about people that group members have to deal with and how they can manage things differently.         Patient's Goal:  1Um F31.81 M Pt will identify what he can do to build up his self esteem, supportive relationships, and his personal empowerment to help increase confidence for discharge.”      Notes:  Pt listened ot he group activity and read aloud from the worksheet.  Pt was attentive and cooperative.  He engaged on a limited basis when prompted but was pleasant     Status After Intervention:  Unchanged    Participation Level: Active Listener    Participation Quality: Appropriate and Attentive      Speech:  normal      Thought Process/Content: Logical      Affective Functioning: Congruent      Mood: euthymic      Level of consciousness:  Attentive      Response to Learning: Able to verbalize current knowledge/experience, Able to verbalize/acknowledge new learning, and Able to retain

## 2024-06-11 NOTE — BH NOTE
Group Therapy Note    Date: 6/11/2024    Group Start Time: 10:00 AM  Group End Time: 10:50 AM  Group Topic: Psychotherapy    [facility] BEHAVIORAL TH OP    Larissa Gray, LCSW        Group Therapy Note    Attendees:  9 scheduled    Focus of session was on identifying the rights that we all have as human beings and the need to keep these in mind when we may be struggling with communicating and relating to other people in our lives.  We spoke about how when self esteem is low and anxiety and depression may be high, we have more internal conflict over what we feel comfortable expressing to others and we run the risk of not saying anything out of fear of rejection or being seen as selfish or wrong.  We discussed the core rights that we have which include the right to say no, the right to negotiate for change, the right to ask for help and support, the right to be the final  of our own beliefs, and the right to our own experiences regardless of its different from other people's experiences.   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  “Pt will identify what his tentative plans for discharge from program look like and what kind of services he can see will be beneficial to him    Notes:  Nato participated in group with prompting.  He spoke about how he \"got some sleep last night\" and then said he slept from 4 pm to 7pm and then 8 pm to 12 pm.\"  We continue to talk about how he would benefit from finding ways to push his time back to sleep until it is dark. He agreed but does not express a lot of confidence in himself to make changes.      Status After Intervention:  Unchanged    Participation Level: Active Listener and Interactive    Participation Quality:  35 35 35 35 35 35 35 35 35 35 35 35 35 35 35 45 45 35

## 2024-06-12 RX ORDER — CETIRIZINE HYDROCHLORIDE 10 MG/1
10 TABLET ORAL DAILY
Qty: 90 TABLET | Refills: 1 | Status: SHIPPED | OUTPATIENT
Start: 2024-06-12

## 2024-06-12 RX ORDER — ARIPIPRAZOLE 2 MG/1
2 TABLET ORAL DAILY
Qty: 90 TABLET | Refills: 1 | Status: SHIPPED | OUTPATIENT
Start: 2024-06-12

## 2024-06-13 ENCOUNTER — OFFICE VISIT (OUTPATIENT)
Age: 64
End: 2024-06-13
Payer: COMMERCIAL

## 2024-06-13 VITALS
DIASTOLIC BLOOD PRESSURE: 80 MMHG | TEMPERATURE: 97.6 F | HEIGHT: 66 IN | SYSTOLIC BLOOD PRESSURE: 110 MMHG | RESPIRATION RATE: 18 BRPM | WEIGHT: 156 LBS | BODY MASS INDEX: 25.07 KG/M2 | OXYGEN SATURATION: 99 % | HEART RATE: 76 BPM

## 2024-06-13 DIAGNOSIS — F31.81 BIPOLAR II DISORDER, MOST RECENT EPISODE MAJOR DEPRESSIVE (HCC): ICD-10-CM

## 2024-06-13 DIAGNOSIS — Z71.89 ACP (ADVANCE CARE PLANNING): ICD-10-CM

## 2024-06-13 DIAGNOSIS — I10 PRIMARY HYPERTENSION: ICD-10-CM

## 2024-06-13 DIAGNOSIS — Z13.1 SCREENING FOR DIABETES MELLITUS: ICD-10-CM

## 2024-06-13 DIAGNOSIS — Z23 ENCOUNTER FOR IMMUNIZATION: ICD-10-CM

## 2024-06-13 DIAGNOSIS — Z00.00 ENCOUNTER FOR WELL ADULT EXAM WITHOUT ABNORMAL FINDINGS: ICD-10-CM

## 2024-06-13 DIAGNOSIS — Z00.01 ENCOUNTER FOR WELL ADULT EXAM WITH ABNORMAL FINDINGS: Primary | ICD-10-CM

## 2024-06-13 PROCEDURE — 36415 COLL VENOUS BLD VENIPUNCTURE: CPT | Performed by: NURSE PRACTITIONER

## 2024-06-13 PROCEDURE — 99396 PREV VISIT EST AGE 40-64: CPT | Performed by: NURSE PRACTITIONER

## 2024-06-13 PROCEDURE — 3079F DIAST BP 80-89 MM HG: CPT | Performed by: NURSE PRACTITIONER

## 2024-06-13 PROCEDURE — 3074F SYST BP LT 130 MM HG: CPT | Performed by: NURSE PRACTITIONER

## 2024-06-13 PROCEDURE — 90670 PCV13 VACCINE IM: CPT | Performed by: NURSE PRACTITIONER

## 2024-06-13 PROCEDURE — 90471 IMMUNIZATION ADMIN: CPT | Performed by: NURSE PRACTITIONER

## 2024-06-13 RX ORDER — LIDOCAINE 50 MG/G
1 PATCH TOPICAL DAILY
Qty: 30 PATCH | Refills: 0 | Status: SHIPPED | OUTPATIENT
Start: 2024-06-13 | End: 2024-07-13

## 2024-06-13 RX ORDER — TRIAMCINOLONE ACETONIDE 1 MG/G
CREAM TOPICAL
Qty: 454 G | Refills: 2 | Status: SHIPPED | OUTPATIENT
Start: 2024-06-13

## 2024-06-13 ASSESSMENT — PATIENT HEALTH QUESTIONNAIRE - PHQ9
8. MOVING OR SPEAKING SO SLOWLY THAT OTHER PEOPLE COULD HAVE NOTICED. OR THE OPPOSITE, BEING SO FIGETY OR RESTLESS THAT YOU HAVE BEEN MOVING AROUND A LOT MORE THAN USUAL: NOT AT ALL
SUM OF ALL RESPONSES TO PHQ9 QUESTIONS 1 & 2: 0
10. IF YOU CHECKED OFF ANY PROBLEMS, HOW DIFFICULT HAVE THESE PROBLEMS MADE IT FOR YOU TO DO YOUR WORK, TAKE CARE OF THINGS AT HOME, OR GET ALONG WITH OTHER PEOPLE: NOT DIFFICULT AT ALL
3. TROUBLE FALLING OR STAYING ASLEEP: NOT AT ALL
2. FEELING DOWN, DEPRESSED OR HOPELESS: NOT AT ALL
SUM OF ALL RESPONSES TO PHQ QUESTIONS 1-9: 0
4. FEELING TIRED OR HAVING LITTLE ENERGY: NOT AT ALL
SUM OF ALL RESPONSES TO PHQ QUESTIONS 1-9: 0
SUM OF ALL RESPONSES TO PHQ QUESTIONS 1-9: 0
1. LITTLE INTEREST OR PLEASURE IN DOING THINGS: NOT AT ALL
SUM OF ALL RESPONSES TO PHQ QUESTIONS 1-9: 0
5. POOR APPETITE OR OVEREATING: NOT AT ALL
7. TROUBLE CONCENTRATING ON THINGS, SUCH AS READING THE NEWSPAPER OR WATCHING TELEVISION: NOT AT ALL
6. FEELING BAD ABOUT YOURSELF - OR THAT YOU ARE A FAILURE OR HAVE LET YOURSELF OR YOUR FAMILY DOWN: NOT AT ALL
9. THOUGHTS THAT YOU WOULD BE BETTER OFF DEAD, OR OF HURTING YOURSELF: NOT AT ALL

## 2024-06-13 NOTE — PROGRESS NOTES
\"Have you been to the ER, urgent care clinic since your last visit?  Hospitalized since your last visit?\"    NO    “Have you seen or consulted any other health care providers outside of [facility] since your last visit?”    NO            Click Here for Release of Records Request      Chief Complaint   Patient presents with    Annual Exam         Vitals:    06/13/24 0732   BP: 110/80   Pulse: 76   Resp: 18   Temp: 97.6 °F (36.4 °C)   SpO2: 99%

## 2024-06-13 NOTE — PROGRESS NOTES
Patient was administered Prevnar 13 vaccine  via  I'm injection.  Patient tolerated medication without side effects noted.  Medication information reviewed with patient, patient states understanding. Patient to resume routine medications at home.  Patient given copy of AVS with medication information and instructions for home.  AVS reviewed with patient and patient states understanding.              No data to display

## 2024-06-13 NOTE — PROGRESS NOTES
Well Adult Note  Name: Nato Small Today’s Date: 2024   MRN: [id number] Sex: Male   Age: 63 y.o. Ethnicity: Non- / Non    : 1960 Race: White (non-)      Nato Small is here for well adult exam.  History:  Doing well no interval concerns  Followed By Dr. Messer fr back pain.  He requests 5 % lidocaine patches .      Skin rash hx of contact dermatitis from poison ivy.  He endorses the steroid injections for his back that he received from Dr. Messer has helped clear up the rash.     Followed by behavioral health enrolled in a day program which is working well per patient.     Review of Systems  Pertinent items are noted on the HPI  No Known Allergies      Prior to Visit Medications    Medication Sig Taking? Authorizing Provider   triamcinolone (KENALOG) 0.1 % cream Apply topically 2 times daily PRN itching Yes Eugenia Yung APRN - NP   lidocaine (LIDODERM) 5 % Place 1 patch onto the skin daily 12 hours on, 12 hours off. Yes Eugenia Yung APRN - NP   ARIPiprazole (ABILIFY) 2 MG tablet Take 1 tablet by mouth daily Yes Willy Reilly MD   cetirizine (ZYRTEC) 10 MG tablet Take 1 tablet by mouth daily Yes Willy Reilly MD   escitalopram (LEXAPRO) 20 MG tablet Take 1 tablet by mouth daily Yes Willy Reilly MD   mirtazapine (REMERON) 15 MG tablet Take 1 tablet by mouth nightly Yes Willy Reilly MD   gabapentin (NEURONTIN) 100 MG capsule Take 2 capsules by mouth 3 times daily. Yes ProviderAndrea MD   diclofenac (VOLTAREN) 75 MG EC tablet TAKE 1 TABLET TWICE A DAY. Yes Eugenia Yung APRN - NP   amLODIPine (NORVASC) 10 MG tablet Take 1 tablet by mouth daily Yes Eugenia Yung APRN - NP   lisinopril (PRINIVIL;ZESTRIL) 20 MG tablet Take 1 tablet by mouth daily Yes Eugenia Yung APRN - NP   divalproex (DEPAKOTE) 500 MG DR tablet Take 1 tablet by mouth 2 times daily Yes Willy Reilly MD   escitalopram (LEXAPRO) 20 MG tablet Take 1 tablet by mouth daily Yes

## 2024-06-14 ENCOUNTER — HOSPITAL ENCOUNTER (OUTPATIENT)
Facility: HOSPITAL | Age: 64
Setting detail: RECURRING SERIES
Discharge: HOME | End: 2024-06-17
Payer: COMMERCIAL

## 2024-06-14 LAB
ALBUMIN/GLOB SERPL: 1.2 (ref 1.1–2.2)
ALP SERPL-CCNC: 98 U/L (ref 45–117)
ALT SERPL-CCNC: 30 U/L (ref 12–78)
ANION GAP SERPL CALC-SCNC: 4 MMOL/L (ref 5–15)
AST SERPL-CCNC: 21 U/L (ref 15–37)
BASOPHILS # BLD: 0.1 K/UL (ref 0–0.1)
BASOPHILS NFR BLD: 1 % (ref 0–1)
BILIRUB SERPL-MCNC: 0.2 MG/DL (ref 0.2–1)
BUN SERPL-MCNC: 17 MG/DL (ref 6–20)
BUN/CREAT SERPL: 22 (ref 12–20)
CALCIUM SERPL-MCNC: 9 MG/DL (ref 8.5–10.1)
CHLORIDE SERPL-SCNC: 105 MMOL/L (ref 97–108)
CHOLEST SERPL-MCNC: 132 MG/DL
CO2 SERPL-SCNC: 29 MMOL/L (ref 21–32)
CREAT SERPL-MCNC: 0.78 MG/DL (ref 0.7–1.3)
DIFFERENTIAL METHOD BLD: ABNORMAL
EOSINOPHIL # BLD: 0.1 K/UL (ref 0–0.4)
EOSINOPHIL NFR BLD: 2 % (ref 0–7)
ERYTHROCYTE [DISTWIDTH] IN BLOOD BY AUTOMATED COUNT: 17.8 % (ref 11.5–14.5)
EST. AVERAGE GLUCOSE BLD GHB EST-MCNC: 105 MG/DL
GLOBULIN SER CALC-MCNC: 3.2 G/DL (ref 2–4)
GLUCOSE SERPL-MCNC: 105 MG/DL (ref 65–100)
HBA1C MFR BLD: 5.3 % (ref 4–5.6)
HCT VFR BLD AUTO: 41.2 % (ref 36.6–50.3)
HDLC SERPL-MCNC: 52 MG/DL
HDLC SERPL: 2.5 (ref 0–5)
HGB BLD-MCNC: 12.6 G/DL (ref 12.1–17)
IMM GRANULOCYTES # BLD AUTO: 0 K/UL (ref 0–0.04)
IMM GRANULOCYTES NFR BLD AUTO: 0 % (ref 0–0.5)
LDLC SERPL CALC-MCNC: 59 MG/DL (ref 0–100)
LYMPHOCYTES # BLD: 1.1 K/UL (ref 0.8–3.5)
LYMPHOCYTES NFR BLD: 12 % (ref 12–49)
MCH RBC QN AUTO: 29.1 PG (ref 26–34)
MCHC RBC AUTO-ENTMCNC: 30.6 G/DL (ref 30–36.5)
MCV RBC AUTO: 95.2 FL (ref 80–99)
MONOCYTES # BLD: 0.5 K/UL (ref 0–1)
MONOCYTES NFR BLD: 6 % (ref 5–13)
NEUTS SEG # BLD: 7.2 K/UL (ref 1.8–8)
NEUTS SEG NFR BLD: 79 % (ref 32–75)
NRBC # BLD: 0 K/UL (ref 0–0.01)
NRBC BLD-RTO: 0 PER 100 WBC
PLATELET # BLD AUTO: 336 K/UL (ref 150–400)
PMV BLD AUTO: 10.5 FL (ref 8.9–12.9)
POTASSIUM SERPL-SCNC: 4.5 MMOL/L (ref 3.5–5.1)
PROT SERPL-MCNC: 7 G/DL (ref 6.4–8.2)
RBC # BLD AUTO: 4.33 M/UL (ref 4.1–5.7)
SODIUM SERPL-SCNC: 138 MMOL/L (ref 136–145)
TRIGL SERPL-MCNC: 105 MG/DL
VLDLC SERPL CALC-MCNC: 21 MG/DL
WBC # BLD AUTO: 9.1 K/UL (ref 4.1–11.1)

## 2024-06-14 PROCEDURE — 90853 GROUP PSYCHOTHERAPY: CPT

## 2024-06-14 NOTE — BH NOTE
Group Therapy Note    Date: 6/14/2024    Group Start Time: 12:00 PM  Group End Time: 12:50 PM  Group Topic: Psychotherapy    [facility] BEHAVIORAL TH OP    Shameka Murphy, BRETT        Group Therapy Note    Attendees: 12 scheduled    Focus of session was based on article on self compassion.  We spoke about why developing self compassion is such a vital part of mental health and substance abuse recovery.  We spoke about ways in which we are all just humans and infallible.  Therapist shared with group members what are the three elements of self compassion which are self kindness, common humanity, and mindfulness.  We also spoke about what self compassion is not which is it is not self pity, self indulgence, and it is not self esteem.  We spoke about what ways group members can develop increased compassion for themselves.         Patient's Goal:  1Um F31.81 U Pt will share with group strategies that he will use when he finds himself in the “red zone” of anger to help him slow down and think about his response.”      Notes:  Pt again listened to the group activity and engaged verbally very little.  He would occasionally nod in support of others but he remained quiet.  Pt is always very helpful/responsive  when others are crying and gets up to give them tissues.  Pt is aware of his surroundings.     Status After Intervention:  Improved    Participation Level: Active Listener and Interactive    Participation Quality: Appropriate, Attentive, Sharing, and Supportive      Speech:  normal      Thought Process/Content: Logical      Affective Functioning: Congruent      Mood: euthymic      Level of consciousness:  Alert, Oriented x4, and Attentive      Response to Learning: Able to verbalize current knowledge/experience, Able to verbalize/acknowledge new learning, Able to retain information, Capable of insight, Able to change behavior, and Progressing to

## 2024-06-14 NOTE — BH NOTE
Group Therapy Note    Date: 6/14/2024    Group Start Time: 10:00 AM  Group End Time: 10:50 AM  Group Topic: Psychotherapy    [facility] BEHAVIORAL TH OP    Shameka Murphy LCSW        Group Therapy Note    Attendees: 12 scheduled    Focus of session was on identifying healthy plans for the weekend.  We spoke about setting goals and intentions for two things this weekend and they are identifying at least one thing that can be done this weekend to help ourselves and one thing that we won't do this weekend to help ourselves.  I spoke with group members about the importance of picking things to do or accomplish that are not in their normal routine and are “steps up” in terms of activities or behaviors that they want to incorporate in their lives.  We also spoke about how what they choose to not do needs to be something that they have the desire to give up in order to better themselves.  I shared that if these goals can be accomplished, they can come back next week with energy and motivation to keep making positive changes.  Group members shared their personal goals and objectives for the weekend.        Behavioral Health Daily Check In form revealed that pt is not experiencing SI/HI thoughts or plans, remaining abstinent from drugs and alcohol, and reports goal today to get help  Patient's Goal:  1Um F31.81 S “Pt will verbalize an understanding of the need for a process of forgiveness of others & self to reduce anger and identify who he holds anger towards at this time in his life.”      Notes:  Pt listened to the group discussion and concerns of others while being cooperative and attentive   He stated that he was not feeling well today and remained quiet for the rest of the group.     Status After Intervention:  Unchanged    Participation Level: Active Listener    Participation Quality: Appropriate      Speech:  normal      Thought Process/Content:

## 2024-06-14 NOTE — BH NOTE
Group Therapy Note    Date: 6/14/2024    Group Start Time: 11:00 AM  Group End Time: 11:50 AM  Group Topic: Psychotherapy    [facility] BEHAVIORAL [facility] OP    [facility]        Group Therapy Note    Attendees: 12 scheduled    Focus of session was on identifying the most damaging and dysfunctional negative thoughts that group members are experiencing right now that are significantly contributing to feelings of depression, anxiety, and/or worry right now.   We spoke about how the impact that this thought or these thoughts can have on our well being and worked with group members to find positive affirmations and thoughts that they can use to challenge these thoughts as well as boost motivation and energy to fight negative thoughts through our thoughts, behaviors, and actions.  Group members were asked to focus on this one thought and work to challenge it for the next week until they have a consistent positive thought or behavior as a result.         Patient's Goal:  1. UM F 31.81 W.  Pt will develop the skill of recognizing when he may be jumping to conclusions or engaged in mind reading about what others think or believe and how it is impacting his mood symptoms.”      Notes:  Nato participated in group with prompting.  He was minimal in sharing his thoughts but he was attentive to others in group.      Status After Intervention:  Unchanged    Participation Level: Active Listener and Interactive    Participation Quality: Appropriate, Attentive, Sharing, and Supportive      Speech:  normal      Thought Process/Content: Logical  Linear      Affective Functioning: Congruent and Flat      Mood: anxious and depressed      Level of consciousness:  Alert, Oriented x4, and Preoccupied      Response to Learning: Able to verbalize current knowledge/experience      Endings: None Reported    Modes of Intervention: Education, Support, Socialization, and

## 2024-06-18 ENCOUNTER — HOSPITAL ENCOUNTER (OUTPATIENT)
Facility: HOSPITAL | Age: 64
Setting detail: RECURRING SERIES
Discharge: HOME | End: 2024-06-21
Payer: COMMERCIAL

## 2024-06-18 PROCEDURE — 90853 GROUP PSYCHOTHERAPY: CPT

## 2024-06-18 NOTE — BH NOTE
Group Therapy Note    Date: 6/18/2024    Group Start Time: 10:00 AM  Group End Time: 10:50 AM  Group Topic: Psychotherapy    [facility] BEHAVIORAL [facility] OP    Larissa Gray, LCSW        Group Therapy Note    Attendees:  10 scheduled    Focus of session was on reasons why it is vital to allow ourselves to feel feelings and process them.  We spoke about how we are taught young that we should avoid feelings such as anger and sadness and how we live in a culture that attempts to hide from feelings. We spoke about the benefit of “leaning into” painful feelings.  We spoke about how we numb good feelings when we learn to numb negative ones, when we struggle with emotions, this often leads to more suffering, and processing and experiencing your feelings is part of having a full life.  We spoke about identifying and processing emotions is a sign of strength and what patient is willing to do right now to develop more abilities to feel and process emotions.     Behavioral Health Daily Check In form revealed that patient is not experiencing SI/HI thoughts or plans, remaining abstinent from drugs and alcohol, and report's goal today of \"to get help.\"        Patient's Goal:  1. UM F 31.81  R.  “Pt will work to replace black and white thinking with the ability to tolerate ambiguity and complexity in people and issues.    Notes:  Nato participated in group with prompting.  He spoke about how he is learning something about managing emotions and he spoke about how he does believe he has come a long way in regards to managing anger.  He spoke about how she is more aware of how it feels after he has angry outbursts whether they be physical or verbal.     Status After Intervention:  Unchanged    Participation Level: Active Listener and Minimal    Participation Quality: Appropriate, Attentive, Sharing, and Supportive      Speech:  normal      Thought Process/Content: 
                                                                      Group Therapy Note    Date: 6/18/2024    Group Start Time: 12:00 PM  Group End Time: 12:50 PM  Group Topic: Psychotherapy    [facility] BEHAVIORAL TH OP    Marina, Larissa S, [facility]        Group Therapy Note    Attendees: 10 scheduled    Focus of session was on self-sabotage behaviors and the need to figure out why we continue to engage in them.  We spoke about reasons why people in general self-sabotage their successes and their recovery and they can include feeling fear of success and change, feeling overwhelmed, fear of the unknown, it is easier to be where we are, it's too much work to change, we cave into others demands, it takes a lot of effort to care for self when we have low self-esteem, and a lack of keo in our own abilities.  We spoke about how hating ourselves and who we are feels this cycle of self-sabotage and unhealthy behaviors.  I shared with patient the need to develop tolerance for themselves and the time it takes to change and develop alternatives to self-sabotage and what those could be.        Patient's Goal:  1. UM F 31.81  T.  Pt will verbalize an increased tolerance for uncomfortable emotions and showing improvement in accepting them and allowing them to peak and lessen over time.”     Notes:  Nato participated in group with prompting.  He spoke about how he wants to \"give more than I plan to everyday\" in regard to effort he puts out.  He spoke about how he is going to work on pursuing a car and he does see that can open up doors to opportunities for him.      Status After Intervention:  Unchanged    Participation Level: Active Listener and Interactive    Participation Quality: Appropriate, Attentive, Sharing, and Supportive      Speech:  normal      Thought Process/Content: Logical  Linear      Affective Functioning: Congruent and Flat      Mood: anxious      Level of consciousness:  Alert, Oriented x4, Attentive, and 
Reported    Modes of Intervention: Education, Support, Socialization, and Clarifying      Discipline Responsible: /Counselor      Signature:  Shameka Murphy LCSW

## 2024-06-20 ENCOUNTER — HOSPITAL ENCOUNTER (OUTPATIENT)
Facility: HOSPITAL | Age: 64
Setting detail: RECURRING SERIES
Discharge: HOME | End: 2024-06-23
Payer: COMMERCIAL

## 2024-06-20 PROCEDURE — 90853 GROUP PSYCHOTHERAPY: CPT

## 2024-06-20 NOTE — BH NOTE
Group Therapy Note    Date: 6/20/2024    Group Start Time: 10:00 AM  Group End Time: 10:50 AM  Group Topic: Psychotherapy    [facility] BEHAVIORAL [facility] OP    Marina, Larissa PASCUAL, LCSW        Group Therapy Note    Attendees:  10 scheduled    Focus of session was based on handout “Fear is your only competitor.”  We spoke about the benefits of identifying fears and working to overcome them and discussing how much fear has been holding patient back.  We spoke about the following ideas that fear builds and feeds on the following things: inaction, indecision, the unknown, unworthiness, feelings of impossibility, negativity, lies we tell ourselves, suffocation, perfectionism, waiting for the right time.  We spoke about what concept patient can identify with and what can be done to work to overcome the fear that is holding patient back.     Behavioral Health Daily Check In form revealed that patient is not experiencing SI/HI thoughts or plans, remaining abstinent from drugs and alcohol, and report's goal today of  \"to get help.\"      Patient's Goal:  1. UM F 31.81  K.  “Pt will identify what his tentative plans for discharge from program look like and what kind of services he can see will be beneficial to him.”     Notes:  Nato participated in group with prompting.  He was quiet but he spoke about how \"I am not afraid of nobody.\"  He appeared to frustrated but he did not share anything further.      Status After Intervention:  Unchanged    Participation Level: Active Listener, Interactive, and Minimal    Participation Quality: Appropriate, Attentive, Sharing, and Supportive      Speech:  normal      Thought Process/Content: Perseverating      Affective Functioning: Congruent and Flat      Mood: irritable      Level of consciousness:  Alert, Oriented x4, and Preoccupied      Response to Learning: Able to verbalize current knowledge/experience, Able to retain 
                                                                      Group Therapy Note    Date: 6/20/2024    Group Start Time: 12:00 PM  Group End Time: 12:50 PM  Group Topic: Psychotherapy    [facility] BEHAVIORAL TH OP    [facility]        Group Therapy Note    Attendees: 10 scheduled    Focus of Session was based on information developed by Petr Alvares in his book “Becoming a Person” on “A Behavioral Model for Personal Growth.”  I shared the three major characteristics of healthy personal growth which are: an increasing openness to experience (a new challenge or changing a point of view), increasing existential living (taking chances and risks) and an increasing trust in one's organism (developed your own identity and expressed your own opinions.)  We spoke about what group members can do to help themselves develop healthy growth so they can take steps forward in their recovery.         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  Pt will work on identifying if current emotional state is based on external or internal factors to help him identify an effective coping strategy.”    Notes:  Nato participated in group with prompting.  He was quiet but he was able to listen to ideas.  He did not share any specific behaviors that he thinks he needs to change right now.      Status After Intervention:  Unchanged    Participation Level: Active Listener, Interactive, and Minimal    Participation Quality: Attentive      Speech:  normal      Thought Process/Content: Linear      Affective Functioning: Congruent and Flat      Mood: anxious      Level of consciousness:  Alert, Oriented x4, and Attentive      Response to Learning: Able to verbalize current knowledge/experience, Able to retain information, Capable of insight, and Progressing to goal      Endings: None Reported    Modes of Intervention: Education, Support, Socialization, Exploration, and Clarifying      Discipline Responsible: Social 
of Intervention: Education, Support, Socialization, Exploration, Clarifying, Problem-solving, Activity, and Limit-setting      Discipline Responsible: /Counselor      Signature:  Shameka Murphy LCSW

## 2024-06-21 ENCOUNTER — HOSPITAL ENCOUNTER (OUTPATIENT)
Facility: HOSPITAL | Age: 64
Setting detail: RECURRING SERIES
Discharge: HOME | End: 2024-06-24
Payer: COMMERCIAL

## 2024-06-21 PROCEDURE — 90853 GROUP PSYCHOTHERAPY: CPT

## 2024-06-21 PROCEDURE — 90832 PSYTX W PT 30 MINUTES: CPT
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
## 2024-06-25 ENCOUNTER — HOSPITAL ENCOUNTER (OUTPATIENT)
Facility: HOSPITAL | Age: 64
Setting detail: RECURRING SERIES
Discharge: HOME | End: 2024-06-28
Payer: COMMERCIAL

## 2024-06-25 PROCEDURE — 90853 GROUP PSYCHOTHERAPY: CPT

## 2024-06-25 NOTE — GROUP NOTE
Group Therapy Note    Date: 6/25/2024    Group Start Time: 11:00 AM  Group End Time: 11:50 AM  Group Topic: Psychotherapy    [facility] BEHAVIORAL TH OP    Shameka Murphy LCSW        Group Therapy Note    Attendees: 9 scheduled    Focus of session was a discussion on goal planning and was based on handout “Creating the Life that you Want: Ten Steps to Accomplishing a Goal.”  We spoke about those steps and being aware of the things you need to give up and avoid in order achieve the goal as well as what you need to start doing. We reviewed ideas for goals for lifetime, a 5 Year plan, and daily goals.  We spoke about financial, physical, social, personal, and educational/vocational.  We went over goals that are most important right now and how to get started.         Patient's Goal:  1Um F31.81 R .  “Pt will work to replace black and white thinking with the ability to tolerate ambiguity and complexity in people and issues.”     Notes:  Pt listened to the group activity and discussion.  He was cooperative but somewhat distracted. He remained quiet for most of the group nodding his head at times to support another member    Status After Intervention:  Unchanged    Participation Level: Minimal    Participation Quality: Appropriate      Speech:  normal      Thought Process/Content: Logical      Affective Functioning: Congruent      Mood: euthymic      Level of consciousness:  Oriented x4 and Preoccupied      Response to Learning: Able to verbalize current knowledge/experience      Endings: None Reported    Modes of Intervention: Education, Support, Socialization, Exploration, Clarifying, Problem-solving, and Activity      Discipline Responsible: /Counselor      Signature:  Shameka Murphy LCSW
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
## 2024-06-27 ENCOUNTER — HOSPITAL ENCOUNTER (OUTPATIENT)
Facility: HOSPITAL | Age: 64
Setting detail: RECURRING SERIES
Discharge: HOME | End: 2024-06-30
Payer: COMMERCIAL

## 2024-06-27 PROCEDURE — 90853 GROUP PSYCHOTHERAPY: CPT

## 2024-06-27 NOTE — BH NOTE
Group Therapy Note    Date: 6/27/2024    Group Start Time: 12:00 PM  Group End Time: 12:50 PM  Group Topic: Psychotherapy    [facility] BEHAVIORAL TH OP    Marina, Larissa S[facility]        Group Therapy Note    Attendees: 10 scheduled    Focus of session was based on article by Arthur Truong titled “what We All Want and Need but Can't Control.”  We spoke about article and how it is focused on seeing other people's approval and why the seeking is not something that we can stop but we can step back and learn to manage it.  We spoke about the goal is to disconnect our self esteem from others approving of us.  We went over the strategies to help ground our self-esteem in what we can control through seeking approval through ourselves, work on our worry about what others think, learning how to talk back to negative voices in our head, and learn to take criticism as feedback.         Patient's Goal:  1. UM F 31.81  K.  Pt will identify what his tentative plans for discharge from program look like and what kind of services he can see will be beneficial to him.”     Notes:  Nato participated in group with prompting.  He spoke about how he is trying to think about how he can have good thoughts about and towards himself. He spoke about how he has never really thought much about himself and how he feels. He can see that he has not had good thoughts about himself as of late and how it impacts his emotions.      Status After Intervention:  Unchanged    Participation Level: Active Listener and Interactive    Participation Quality: Appropriate, Attentive, and Supportive      Speech:  normal      Thought Process/Content: Logical  Linear      Affective Functioning: Congruent      Mood: anxious and depressed      Level of consciousness:  Alert, Oriented x4, Attentive, and Preoccupied      Response to Learning: Able to verbalize current knowledge/experience and Able to

## 2024-06-27 NOTE — BH NOTE
Group Therapy Note    Date: 6/27/2024    Group Start Time: 11:00 AM  Group End Time: 11:50 AM  Group Topic: Psychotherapy    [facility] [place] BEHAVIORAL TH OP    Shameka Murphy LCSW        Group Therapy Note    Attendees: 10 scheduled    Focus of session was on information from the “4 A's of stress relief.”  I shared the idea of minor and major stress relievers include the first two A's of avoid and alter.  I shared that avoid means to just not deal with the needless stressors and make plans to avoid them whenever possible.  We spoke about how we need to learn to say no and focus on doing the priorities every day.  We also spoke about the need to alter situations to manage stress and work to change situations for the better.  We spoke about how these skills include asking others to change their behavior, communicating openly, managing time more effectively and setting limits in advance.  I asked group members to share their thoughts about which skills they can use to manage their stress more effectively.         Patient's Goal:  1Um F31.81 M Pt will identify what he can do to build up his self esteem, supportive relationships, and his personal empowerment to help increase confidence for discharge.”      Notes:  Pt participated in the group activity and engaged when prompted.  He shared that work brings him yogi.  He has been more active recently and that has helped him feel better about himself and more confident.  He states he likes to be busy and not just sit around the house.  It is a stress reliever too as he works to manage his anger and impulsiveness      Status After Intervention:  Improved    Participation Level: Active Listener    Participation Quality: Appropriate and Attentive      Speech:  normal      Thought Process/Content: Logical      Affective Functioning: Congruent      Mood: anxious      Level of consciousness:  Attentive      Response

## 2024-06-27 NOTE — BH NOTE
Group Therapy Note    Date: 6/27/2024    Group Start Time: 10:00 AM  Group End Time: 10:50 AM  Group Topic: Psychotherapy    [facility] BEHAVIORAL TH OP    Marina, Larissa S, LCSW        Group Therapy Note    Attendees: 10 scheduled    Focus of session was on information based on DBT and Mariangel Villa's skills training manual.  We spoke about handout of “Reducing Vulnerability to Emotion Mind-Building a Life [facility].”  We spoke about the skill ABC Please.  We spoke about the strategies of Accumulate Positive Emotions, Build Mastery, [place] Ahead of Time with Emotional Situations, and PLEASE for treat Physical illness, balance Eating, avoid mood-Altering substances (and behaviors), balance Sleep, and get Exercise.  We spoke about how these strategies can help patient today to be able to manage emotions more effectively.   Behavioral Health Daily Check In form revealed that patient is not experiencing SI/HI thoughts or plans, remaining abstinent from drugs and alcohol, and report's goal today of   \"to get help.\"       Patient's Goal:  1. UM F 31.81  T. “Pt will verbalize an increased tolerance for uncomfortable emotions and showing improvement in accepting them and allowing them to peak and lessen over time    Notes:  Nato participated in group with prompting.  He was more positive and interactive and he spoke about how he knows \"I did not handle myself well last week.\"  He spoke about how he knows that anger and frustration \"have always gotten to me.\"  He spoke about how he knows now to walk away and work to find some rational thoughts about what is going on.      Status After Intervention:  Unchanged    Participation Level: Active Listener and Interactive    Participation Quality: Appropriate, Attentive, Sharing, and Supportive      Speech:  normal      Thought Process/Content: Logical  Linear      Affective Functioning: Congruent      Mood:

## 2024-06-28 ENCOUNTER — HOSPITAL ENCOUNTER (OUTPATIENT)
Facility: HOSPITAL | Age: 64
Setting detail: RECURRING SERIES
Discharge: HOME | End: 2024-07-01
Payer: COMMERCIAL

## 2024-06-28 PROCEDURE — 90853 GROUP PSYCHOTHERAPY: CPT

## 2024-06-28 NOTE — BH NOTE
Group Therapy Note    Date: 6/28/2024    Group Start Time: 12:00 PM  Group End Time: 12:50 PM  Group Topic: Psychotherapy    [facility] BEHAVIORAL [facility] OP    Shameka Murphy LCSW        Group Therapy Note    Attendees: 9 scheduled    Focus of session was on identifying the difference between mindless responses and mindful responses.  We spoke about the impact that either has on our moods and our functioning.  We spoke about the benefits of mindfulness which included being non judging, based on facts, can distance self from thoughts, being in an approach mode, can let go, and are more calm and effective in any given moment.           Patient's Goal:  1Um F31.81 N “Pt will work on identifying if current emotional state is based on external or internal factors to help him identify an effective coping strategy    Notes:  Pt listened to the group activity and discussion.  Again pt observed and did not verbally engage.  However ,pt is attentive and in tune to the needs of others as he is always the first to get up and give tissues to group members when needed.     Status After Intervention:  Improved    Participation Level: Active Listener and Interactive    Participation Quality: Appropriate and Attentive      Speech:  normal      Thought Process/Content: Logical      Affective Functioning: Congruent      Mood: euthymic      Level of consciousness:  Alert, Oriented x4, and Attentive      Response to Learning: Able to verbalize current knowledge/experience and Able to retain information      Endings: None Reported    Modes of Intervention: Education, Support, Socialization, Exploration, and Clarifying      Discipline Responsible: /Counselor      Signature:  Shameka Murphy LCSW    
Intervention:  Unchanged    Participation Level: Active Listener and Interactive    Participation Quality: Appropriate, Attentive, Sharing, and Supportive      Speech:  normal      Thought Process/Content: Logical  Linear      Affective Functioning: Congruent and Flat      Mood: angry, anxious, and irritable      Level of consciousness:  Alert, Oriented x4, and Preoccupied      Response to Learning: Able to verbalize current knowledge/experience and Capable of insight      Endings: None Reported    Modes of Intervention: Education, Support, Socialization, and Exploration      Discipline Responsible: /Counselor      Signature:  Larissa Gray LCSW

## 2024-06-28 NOTE — GROUP NOTE
Group Therapy Note    Date: 6/28/2024    Group Start Time: 11:00 AM  Group End Time: 11:50 AM  Group Topic: Psychotherapy    [facility] BEHAVIORAL TH OP    Shameka Murphy, BRETT        Group Therapy Note    Attendees: 9 scheduled    Focus of session was on assertiveness and a lack of it contributes to depression, anxiety, worry, and fear that build up over even small and manageable things that we need to say.  We spoke about the skill from DBT called DEAR MAN that can help us to focus on what we need to say and why we need to say it.  We spoke about how we cannot assume that others understand how we feel and we have to express ourselves to others clearly.  We spoke about how we have to change in order to help others change for us in positive ways for our relationships and that we will continue to feel the above feelings unless we take some risks and be willing to make things worse before they have a chance to get better.        Patient's Goal:  1Um F31.81 S “Pt will verbalize an understanding of the need for a process of forgiveness of others & self to reduce anger and identify who he holds anger towards at this time in his life.”      Notes:  Pt listened to the group discussion and introduced himself to a new member.  He observed the group and was attentive and cooperative but did not engage further    Status After Intervention:  Unchanged    Participation Level: Active Listener    Participation Quality: Appropriate, Attentive, Sharing, and Supportive      Speech:  normal      Thought Process/Content: Logical      Affective Functioning: Congruent      Mood: euthymic      Level of consciousness:  Alert, Oriented x4, and Attentive      Response to Learning: Able to verbalize current knowledge/experience, Able to verbalize/acknowledge new learning, Able to retain information, and Capable of insight      Endings: None Reported    Modes of Intervention:

## 2024-07-02 ENCOUNTER — HOSPITAL ENCOUNTER (OUTPATIENT)
Facility: HOSPITAL | Age: 64
Setting detail: RECURRING SERIES
Discharge: HOME | End: 2024-07-05
Payer: COMMERCIAL

## 2024-07-02 PROCEDURE — 90853 GROUP PSYCHOTHERAPY: CPT

## 2024-07-02 RX ORDER — ESCITALOPRAM OXALATE 20 MG/1
20 TABLET ORAL DAILY
Qty: 10 TABLET | Refills: 0 | Status: SHIPPED | OUTPATIENT
Start: 2024-07-02 | End: 2024-07-12

## 2024-07-03 ENCOUNTER — HOSPITAL ENCOUNTER (OUTPATIENT)
Facility: HOSPITAL | Age: 64
Setting detail: RECURRING SERIES
Discharge: HOME | End: 2024-07-06
Payer: COMMERCIAL

## 2024-07-03 PROCEDURE — 90853 GROUP PSYCHOTHERAPY: CPT

## 2024-07-03 NOTE — BH NOTE
Group Therapy Note    Date: 7/3/2024    Group Start Time: 10:00 AM  Group End Time: 10:50 AM  Group Topic: Psychotherapy    [facility] BEHAVIORAL TH OP    Shameka Murphy, BRETT        Group Therapy Note    Attendees: 11 scheduled    . Focus of group session was a discussion on anxiety and what triggers anxiety and what anxiety feels like for each group member.  We spoke about grounding techniques and the importance of returning to the present moment when anxiety is high and thoughts are starting to race and become irrational and fearful.  We spoke about the techniques shared in an article from \"[employer]\" and the techniques include square breathing, developing mantras, touching and describing objects around us, and using declarative memory (name all the dog breeds you know, colors.)  We spoke about what techniques can be helpful for each group member.       Behavioral Health Daily Check In form revealed that pt is not experiencing SI/HI thoughts or plans, remaining abstinent from drugs and alcohol, and reports goal today to get help  Patient's Goal:  1Um F31.81 V    “Pt will practice stop, pause, listen, feel and think before responding and report to group his progress.”      Notes:  Pt listened to the group discussion and concerns of other members.  His affect was bright as he observed the group interaction.  Pt remained quiet after saying hello to everyone at the beginning of group.     Status After Intervention:  Unchanged    Participation Level: Minimal    Participation Quality: Appropriate and Attentive      Speech:  normal      Thought Process/Content: Logical      Affective Functioning: Congruent      Mood: euthymic      Level of consciousness:  Alert and Attentive      Response to Learning: Able to verbalize current knowledge/experience, Able to retain information, and Capable of insight      Endings: None Reported    Modes of 
                                                                      Group Therapy Note    Date: 7/3/2024    Group Start Time: 12:00 PM  Group End Time: 12:50 PM  Group Topic: Psychotherapy    [facility] BEHAVIORAL TH OP    Shameka Murphy, ZAIDAW        Group Therapy Note    Attendees: 12 scheduled    Focus of session was on assertiveness and a lack of it contributes to depression, anxiety, worry, and fear that build up over even small and manageable things that we need to say.  We spoke about the skill from DBT called DEAR MAN that can help us to focus on what we need to say and why we need to say it.  We spoke about how we cannot assume that others understand how we feel and we have to express ourselves to others clearly.  We spoke about how we have to change in order to help others change for us in positive ways for our relationships and that we will continue to feel the above feelings unless we take some risks and be willing to make things worse before they have a chance to get better.        Patient's Goal:  1Um F31.81 S .    “Pt will verbalize an understanding of the need for a process of forgiveness of others & self to reduce anger and identify who he holds anger towards at this time in his life.”      Notes:  Pt listened to the group activity and followed along as we reviewed tools for dealing with anxiety.  He shared that he really doesn't have a lot to do this weekend but he will be talking care of his sisters dog.  He will help her out as the dog does not like fireworks and his sister is having a party.  We also discussed finding things to help him stay busy and not sleep throughout the days     Status After Intervention:  Improved    Participation Level: Active Listener and Interactive    Participation Quality: Appropriate and Attentive      Speech:  normal      Thought Process/Content: Logical      Affective Functioning: Congruent      Mood: euthymic      Level of consciousness:  Alert, Oriented x4, and 
Learning: Able to verbalize current knowledge/experience, Able to verbalize/acknowledge new learning, and Capable of insight      Endings: None Reported    Modes of Intervention: Education, Support, Socialization, Exploration, and Clarifying      Discipline Responsible: /Counselor      Signature:  Larissa Gray LCSW

## 2024-07-03 NOTE — BH NOTE
Group Therapy Note    Date: 7/2/2024    Group Start Time: 10:00 AM  Group End Time: 10:50 AM  Group Topic: Psychotherapy    [facility] BEHAVIORAL TH OP    Marina, Larissa PASCUAL, LCSW        Group Therapy Note    Attendees: 12 scheduled    Focus of session was based on handouts from [facility]'s “Understanding and Reducing Angry Feelings” workbook.  We spoke about understanding the roots of anger such as fear, including fear of losing face or being embarrassed, or pain, being hurt by words and attitudes.  We spoke about anger triggers in regard to family, work, friends, and with strangers in regard to pain and fear.  We also spoke about tips for managing anger including asking self if anger is justified, talk rather than act out angry feelings.     Behavioral Health Daily Check In form revealed that patient is not experiencing SI/HI thoughts or plans, remaining abstinent from drugs and alcohol, and report's goal today of   \"to get help.\"      Patient's Goal:  1. UM F 31.81  M.  “Pt will identify what he can do to build up his self esteem, supportive relationships, and his personal empowerment to help increase confidence for discharge    Notes:  Nato participated in group well. He spoke about how he can recognize how anger has gotten in his way of having relationships and that he is trying to own how he has reacted in the past.  He spoke about how he is working on not making his emotional state worse by feeling shame.  He is open to ideas and support.      Status After Intervention:  Unchanged    Participation Level: Active Listener and Interactive    Participation Quality: Appropriate, Attentive, Sharing, and Supportive      Speech:  normal      Thought Process/Content: Logical  Linear      Affective Functioning: Congruent      Mood: anxious      Level of consciousness:  Alert, Oriented x4, Attentive, and Preoccupied      Response to Learning: Able to verbalize 
                                                                      Group Therapy Note    Date: 7/2/2024    Group Start Time: 11:00 AM  Group End Time: 11:50 AM  Group Topic: Psychotherapy    [facility] BEHAVIORAL [facility] OP    Shameka Murphy LCSW        Group Therapy Note    Attendees:12 scheduled    Focus of session was on identifying the difference between mindless responses and mindful responses.  We spoke about the impact that either has on our moods and our functioning.  We spoke about the benefits of mindfulness which included being non judging, based on facts, can distance self from thoughts, being in an approach mode, can let go, and are more calm and effective in any given moment.          Patient's Goal:  1Um F31.81 R .  “Pt will work to replace black and white thinking with the ability to tolerate ambiguity and complexity in people and issues.”      Notes:  Pt followed along with the group activity and discussion.  He was alert and cooperative while the group discussed  the worksheet. He shared that he did not sleep well last night but was feeling calm during group.     Status After Intervention:  Unchanged    Participation Level: Active Listener    Participation Quality: Appropriate, Attentive, Sharing, and Supportive      Speech:  normal      Thought Process/Content: Logical      Affective Functioning: Congruent      Mood: euthymic      Level of consciousness:  Alert, Oriented x4, and Attentive      Response to Learning: Able to verbalize current knowledge/experience, Able to verbalize/acknowledge new learning, Able to retain information, Capable of insight, Able to change behavior, and Progressing to goal      Endings: None Reported    Modes of Intervention: Education, Support, Socialization, Exploration, Clarifying, and Problem-solving      Discipline Responsible: /Counselor      Signature:  Shameka Murphy LCSW    
                                                                      Group Therapy Note    Date: 7/2/2024    Group Start Time: 12:00 PM  Group End Time: 12:50 PM  Group Topic: Psychotherapy    [facility] BEHAVIORAL TH OP    Larissa Gray LCSW        Group Therapy Note    Attendees: 12 scheduled    Focus of session was based on information from Adri Oscar LCSW on three ways to stop overthinking.  This writer shared with patient the process of overthinking and we spoke about how to affects our mental and physical health.  We spoke about the three strategies which include positive reframing, writing down thoughts and walking away in order to not respond during “crisis mode”, and practicing specific gratitude.  We spoke about how they can help and how patient believe these strategies may help them stop overthinking.         Patient's Goal:  1.UM F 31.81  W.  Pt will develop the skill of recognizing when he may be jumping to conclusions or engaged in mind reading about what others think or believe and how it is impacting his mood symptoms    Notes:  Nato participated in group with prompting.  He spoke about how he is doing what he can to manage his overthinking tendencies.  We spoke about how he is trying to think about other ways he can see situations or people.      Status After Intervention:  Unchanged    Participation Level: Active Listener and Interactive    Participation Quality: Appropriate, Attentive, Sharing, and Supportive      Speech:  normal      Thought Process/Content: Logical  Linear      Affective Functioning: Congruent      Mood: anxious      Level of consciousness:  Alert, Oriented x4, Attentive, and Preoccupied      Response to Learning: Able to verbalize current knowledge/experience, Able to retain information, Capable of insight, Able to change behavior, and Progressing to goal      Endings: None Reported    Modes of Intervention: Education, Support, Socialization, Exploration, Clarifying, and 
[facility]  Group Therapy  Treatment Plan Review    TREATMENT PLAN REVIEW REVIEW DATE: 6/28/2024     summary of Ongoing issues with Symptoms/Functional Impairments Indicating Need for Continued Stay:  Nato has had some setbacks this month as he has continued to be Dealing with a lot of pain issues and he has been very easily agitated.  he continues to try and work on adjusting to being around others as he has been living isolated for many years and only really getting out to interact with people. He has been easily irritated with others that have escelated into anger. hE HAS HAD INTERACTIONS WITH OTHERS IN WHICH HE HAS BEEN IRRATIONAL IN HIS ANGER AND HE NEEDED LIMITATIONS SET FOR HIM.  wE WILL CONTINUE TO WORK ON MANAGING EMOTIONS AND HIS MOOD with PATIENT.        TREATMENT & DISCHARGE PLANNING CHANGES    Modality or Intervention  Changes Pt will continue to attend three groups a day, three days a week.     Psychotropic Medication Changes On 6/4/2024 D/C the Trazodone and start Mirtazapine at 15 mg QHS   Discharge  Planning or Target Date  Changes 8/31/2024   New Issues Increased irritability, outbursts     TREATMENT TEAM SIGNATURE / DATE   I have participated in the development of this plan of treatment and agreed to its implementation.   Client Signature PRINTED Name                       Nato Small Date & Time       Family / Legal Representative Signature (If Applicable) PRINTED Name & Relationship     Date & Time   Therapist Signature PRINTED Name & Credential                            Colette Gray LCSW Date & Time       Therapist Signature PRINTED Name & Credential                         Brenda Murphy LCSW   Date & Time       Other Signature PRINTED Name & Credential     Date & Time   Other Signature PRINTED Name & Credential or Relationship     Date & Time       PHYSICIAN CERTIFICATION OF THE LEVEL OF CARE:  I certify that these outpatient behavioral health services are 
Statement Selected

## 2024-07-09 ENCOUNTER — HOSPITAL ENCOUNTER (OUTPATIENT)
Facility: HOSPITAL | Age: 64
Setting detail: RECURRING SERIES
Discharge: HOME | End: 2024-07-12
Payer: COMMERCIAL

## 2024-07-09 PROCEDURE — 90853 GROUP PSYCHOTHERAPY: CPT

## 2024-07-09 NOTE — BH NOTE
Group Therapy Note    Date: 7/9/2024    Group Start Time: 10:00 AM  Group End Time: 10:50 AM  Group Topic: Psychotherapy    [facility] BEHAVIORAL TH OP    Shameka Murphy LCSW        Group Therapy Note    Attendees: 10 scheduled    Focus of session was a review of the weekend and helping group members see their wins that they can celebrate from the past several days.  We spoke about the things that occurred and choices that they had in front of them and helping everyone to see the positive impact that they had on their well being.  We also spoke about what may have been the setbacks and how they coped and what they can plan to do for the future to help build on relapse prevent          Behavioral Health Daily Check In form revealed that pt is not experiencing SI/HI thoughts or plans, remaining abstinent from drugs and alcohol, and reports goal today to get help.   Patient's Goal:  1UM F31.81 U .“Pt will share with group strategies that he will use when he finds himself in the “red zone” of anger to help him slow down and think about his response.”      Notes:  Pt listened to the group discussion and concerns of others.  He was attentive and cooperative   Often shaking his head in support.  Pt shared that he was glad when his sister got her dog yesterday.  He feels he was doing her a favor but did not like to have to walk the dog. Pt states he is still getting limited sleep   Status After Intervention:  Unchanged    Participation Level: Active Listener    Participation Quality: Attentive      Speech:  normal      Thought Process/Content: Logical      Affective Functioning: Congruent      Mood: euthymic      Level of consciousness:  Alert, Oriented x4, and Attentive      Response to Learning: Able to verbalize current knowledge/experience and Able to verbalize/acknowledge new learning      Endings: None Reported    Modes of Intervention: Education, 
                                                                      Group Therapy Note    Date: 7/9/2024    Group Start Time: 12:00 PM  Group End Time: 12:50 PM  Group Topic: Psychotherapy    [facility] BEHAVIORAL TH OP    [facility]        Group Therapy Note    Attendees: 10 scheduled    Focus of session was based on information from the book “The Brain Fog Fix” by Dr. Jarod Mendez.  We spoke about what brain fog can feel like and what it can lead to which includes increased anxiety and depression as well as problems with memory and functioning.  I focused on the information that book provides about how we eat, sleep, work, and live and how our choices disrupt our chemical levels of dopamine, serotonin, and cortisol and that unstable levels of these chemicals lead to “brain fog.”  We discussed what our brain needs and what fogs our brain that is clearly laid out in the book including diet, exercise, use of brain altering chemicals, and exposure to screens.         Patient's Goal:  1. UM F 31.81 X.  Pt will express the belief that even with his mental health diagnosis, he can learn to thrive in his life and work towards new goals and accomplishments that increase his self esteem    Notes:  Nato participated in group with prompting.  He was understanding of the topic and spoke about how he knows it would benefit him to give up caffeine.     Status After Intervention:  Unchanged    Participation Level: Active Listener and Interactive    Participation Quality: Appropriate, Attentive, Sharing, and Supportive      Speech:  normal and pressured      Thought Process/Content: Logical  Linear      Affective Functioning: Congruent      Mood: anxious and depressed      Level of consciousness:  Alert, Oriented x4, Attentive, and Preoccupied      Response to Learning: Able to verbalize current knowledge/experience, Able to retain information, and Capable of insight      Endings: None Reported    Modes of Intervention: 
Clarifying      Discipline Responsible: /Counselor      Signature:  Larissa Gray LCSW

## 2024-07-11 ENCOUNTER — TELEPHONE (OUTPATIENT)
Age: 64
End: 2024-07-11

## 2024-07-12 ENCOUNTER — HOSPITAL ENCOUNTER (OUTPATIENT)
Facility: HOSPITAL | Age: 64
Setting detail: RECURRING SERIES
Discharge: HOME | End: 2024-07-15
Payer: COMMERCIAL

## 2024-07-12 NOTE — TELEPHONE ENCOUNTER
Patient called. Spoke to him. States he no longer needs refills completed by this office. He states that he got a phone call from Dr Brown office and refills have been completed. No further needs at this time.

## 2024-07-15 ENCOUNTER — TELEPHONE (OUTPATIENT)
Age: 64
End: 2024-07-15

## 2024-07-15 NOTE — TELEPHONE ENCOUNTER
aNto called back and said he doesn't want to go back to [facility], he wants [facility] to follow him and fill his medications.

## 2024-07-15 NOTE — TELEPHONE ENCOUNTER
Nato wants to speak to Clara. Wouldn't say what it was about. Just wants Clara to call back when she gets a chance.

## 2024-07-17 NOTE — TELEPHONE ENCOUNTER
S/w patient states doesn't need [facility] anymore and in the future would like you to refill his RX, YOSHI

## 2024-08-12 ENCOUNTER — OFFICE VISIT (OUTPATIENT)
Age: 64
End: 2024-08-12
Payer: COMMERCIAL

## 2024-08-12 VITALS
SYSTOLIC BLOOD PRESSURE: 122 MMHG | HEART RATE: 66 BPM | OXYGEN SATURATION: 96 % | HEIGHT: 66 IN | BODY MASS INDEX: 26.52 KG/M2 | DIASTOLIC BLOOD PRESSURE: 70 MMHG | WEIGHT: 165 LBS | TEMPERATURE: 98.1 F

## 2024-08-12 DIAGNOSIS — M54.50 CHRONIC MIDLINE LOW BACK PAIN WITHOUT SCIATICA: ICD-10-CM

## 2024-08-12 DIAGNOSIS — G89.29 CHRONIC MIDLINE LOW BACK PAIN WITHOUT SCIATICA: ICD-10-CM

## 2024-08-12 DIAGNOSIS — F31.81 BIPOLAR II DISORDER, MOST RECENT EPISODE MAJOR DEPRESSIVE (HCC): Primary | ICD-10-CM

## 2024-08-12 PROCEDURE — 3078F DIAST BP <80 MM HG: CPT | Performed by: NURSE PRACTITIONER

## 2024-08-12 PROCEDURE — 36415 COLL VENOUS BLD VENIPUNCTURE: CPT | Performed by: NURSE PRACTITIONER

## 2024-08-12 PROCEDURE — 3074F SYST BP LT 130 MM HG: CPT | Performed by: NURSE PRACTITIONER

## 2024-08-12 PROCEDURE — 99214 OFFICE O/P EST MOD 30 MIN: CPT | Performed by: NURSE PRACTITIONER

## 2024-08-12 RX ORDER — ESCITALOPRAM OXALATE 20 MG/1
20 TABLET ORAL DAILY
Qty: 30 TABLET | Refills: 5 | Status: SHIPPED | OUTPATIENT
Start: 2024-08-12

## 2024-08-12 RX ORDER — DIVALPROEX SODIUM 500 MG/1
500 TABLET, DELAYED RELEASE ORAL 2 TIMES DAILY
Qty: 60 TABLET | Refills: 5 | Status: SHIPPED | OUTPATIENT
Start: 2024-08-12

## 2024-08-12 RX ORDER — MIRTAZAPINE 15 MG/1
15 TABLET, FILM COATED ORAL NIGHTLY
Qty: 30 TABLET | Refills: 5 | Status: SHIPPED | OUTPATIENT
Start: 2024-08-12

## 2024-08-12 RX ORDER — MELOXICAM 7.5 MG/1
7.5 TABLET ORAL DAILY
Qty: 30 TABLET | Refills: 5 | Status: SHIPPED | OUTPATIENT
Start: 2024-08-12

## 2024-08-12 SDOH — ECONOMIC STABILITY: FOOD INSECURITY: WITHIN THE PAST 12 MONTHS, YOU WORRIED THAT YOUR FOOD WOULD RUN OUT BEFORE YOU GOT MONEY TO BUY MORE.: OFTEN TRUE

## 2024-08-12 SDOH — ECONOMIC STABILITY: INCOME INSECURITY: HOW HARD IS IT FOR YOU TO PAY FOR THE VERY BASICS LIKE FOOD, HOUSING, MEDICAL CARE, AND HEATING?: SOMEWHAT HARD

## 2024-08-12 SDOH — ECONOMIC STABILITY: FOOD INSECURITY: WITHIN THE PAST 12 MONTHS, THE FOOD YOU BOUGHT JUST DIDN'T LAST AND YOU DIDN'T HAVE MONEY TO GET MORE.: OFTEN TRUE

## 2024-08-12 ASSESSMENT — PATIENT HEALTH QUESTIONNAIRE - PHQ9
2. FEELING DOWN, DEPRESSED OR HOPELESS: NOT AT ALL
SUM OF ALL RESPONSES TO PHQ QUESTIONS 1-9: 0
9. THOUGHTS THAT YOU WOULD BE BETTER OFF DEAD, OR OF HURTING YOURSELF: NOT AT ALL
8. MOVING OR SPEAKING SO SLOWLY THAT OTHER PEOPLE COULD HAVE NOTICED. OR THE OPPOSITE, BEING SO FIGETY OR RESTLESS THAT YOU HAVE BEEN MOVING AROUND A LOT MORE THAN USUAL: NOT AT ALL
SUM OF ALL RESPONSES TO PHQ QUESTIONS 1-9: 0
3. TROUBLE FALLING OR STAYING ASLEEP: NOT AT ALL
5. POOR APPETITE OR OVEREATING: NOT AT ALL
SUM OF ALL RESPONSES TO PHQ9 QUESTIONS 1 & 2: 0
1. LITTLE INTEREST OR PLEASURE IN DOING THINGS: NOT AT ALL
10. IF YOU CHECKED OFF ANY PROBLEMS, HOW DIFFICULT HAVE THESE PROBLEMS MADE IT FOR YOU TO DO YOUR WORK, TAKE CARE OF THINGS AT HOME, OR GET ALONG WITH OTHER PEOPLE: NOT DIFFICULT AT ALL
SUM OF ALL RESPONSES TO PHQ QUESTIONS 1-9: 0
6. FEELING BAD ABOUT YOURSELF - OR THAT YOU ARE A FAILURE OR HAVE LET YOURSELF OR YOUR FAMILY DOWN: NOT AT ALL
SUM OF ALL RESPONSES TO PHQ QUESTIONS 1-9: 0
7. TROUBLE CONCENTRATING ON THINGS, SUCH AS READING THE NEWSPAPER OR WATCHING TELEVISION: NOT AT ALL
4. FEELING TIRED OR HAVING LITTLE ENERGY: NOT AT ALL

## 2024-08-12 NOTE — PROGRESS NOTES
Subjective:     Nato Small is a 63 y.o. male who presents today with the following:  Chief Complaint   Patient presents with    Medication Refill       Patient Active Problem List   Diagnosis    Hypertension    Rash    Alcohol dependence in remission (HCC)    Alcohol dependence with alcohol-induced persisting dementia (HCC)    Bipolar II disorder, most recent episode major depressive (HCC)    Learning disabilities    Cannabis use disorder, severe, in early remission (HCC)         COMPLIANT WITH MEDICATION:    Denies chest pain, dyspnea, palpitations, headache and blurred vision. Blood pressure normotensive.    Bipolar II disorder with depression.  Recently followed by Dr. Reilly for psychiatry . PCP was asked to take over medication refills .  He is doing well no interval concerns . We discussed checking a Depakote level today.   He denies suicidal thoughts or plans. He notes he did not sleep well last night as her ran out of Remeron. We reviewed asking for help and suicidal hotline.     depression addressed active diagnosis well controlled with medication as prescribed below.     abuse screening addressed denies    learning assessment addressed reviewed nurses notes    fall risk addressed not at risk    HM: addressed he declines immunizations today.     ROS:  Gen: denies fever, chills, fatigue, weight loss, weight gain  HEENT:denies blurry vision, nasal congestion, sore throat  Resp: denies dypsnea, cough, wheezing  CV: denies chest pain radiating to the jaws or arms, palpitations, lower extremity edema  Abd: denies nausea, vomiting, diarrhea, constipation  Neuro: denies numbness/tingling  Endo: denies polyuria, polydipsia, heat/cold intolerance  Heme: no lymphadenopathy    No Known Allergies      Current Outpatient Medications:     escitalopram (LEXAPRO) 20 MG tablet, Take 1 tablet by mouth daily, Disp: 30 tablet, Rfl: 5    mirtazapine (REMERON) 15 MG tablet, Take 1 tablet by mouth nightly, Disp: 30 tablet,

## 2024-08-12 NOTE — PROGRESS NOTES
\"Have you been to the ER, urgent care clinic since your last visit?  Hospitalized since your last visit?\"    NO    “Have you seen or consulted any other health care providers outside of [facility] since your last visit?”    NO            Click Here for Release of Records Request      Chief Complaint   Patient presents with    Medication Refill         Vitals:    08/12/24 0849   BP: 122/70   Pulse: 66   Temp: 98.1 °F (36.7 °C)   SpO2: 96%

## 2024-08-13 LAB — VALPROATE SERPL-MCNC: 41 UG/ML (ref 50–100)

## 2024-08-13 RX ORDER — AMLODIPINE BESYLATE 10 MG/1
10 TABLET ORAL DAILY
Qty: 90 TABLET | Refills: 1 | Status: SHIPPED | OUTPATIENT
Start: 2024-08-13

## 2024-08-13 RX ORDER — LISINOPRIL 20 MG/1
20 TABLET ORAL DAILY
Qty: 90 TABLET | Refills: 1 | Status: SHIPPED | OUTPATIENT
Start: 2024-08-13

## 2024-08-14 ENCOUNTER — TELEPHONE (OUTPATIENT)
Age: 64
End: 2024-08-14

## 2024-08-14 NOTE — TELEPHONE ENCOUNTER
Nato would like for Clara to give him a call. He has some questions about his lab results from 8-12.

## 2024-08-15 ENCOUNTER — TELEPHONE (OUTPATIENT)
Age: 64
End: 2024-08-15

## 2024-08-16 ENCOUNTER — TELEPHONE (OUTPATIENT)
Age: 64
End: 2024-08-16

## 2024-08-16 NOTE — TELEPHONE ENCOUNTER
Nato states [facility] will not send him his meloxicam, they think he is allergic to it and he isn't. Wants to know if someone can call to tell them he has no known allergies. Because of this they won't send his mirtazapine which he needs for sleeping.

## 2024-08-19 NOTE — TELEPHONE ENCOUNTER
**See Previous Note**  Nato is calling to check on the status of note from Friday about [facility] not sending medications due to them thinking he has allergies.

## 2024-08-27 ENCOUNTER — TELEPHONE (OUTPATIENT)
Age: 64
End: 2024-08-27

## 2024-08-27 RX ORDER — MELOXICAM 15 MG/1
15 TABLET ORAL DAILY PRN
Qty: 90 TABLET | Refills: 1 | Status: SHIPPED | OUTPATIENT
Start: 2024-08-27

## 2024-08-27 NOTE — TELEPHONE ENCOUNTER
Nato states that he would need a stronger dosage of the meloxicam. He is taking 7.5 mg and it doesn't seem to be working.       [facility] [place] - [address] [phone] - F [phone]  [facility] [place] [id number]  Phone: [phone] Fax: [phone]

## 2024-10-14 ENCOUNTER — OFFICE VISIT (OUTPATIENT)
Age: 64
End: 2024-10-14
Payer: COMMERCIAL

## 2024-10-14 VITALS
HEART RATE: 61 BPM | TEMPERATURE: 96.8 F | WEIGHT: 160 LBS | BODY MASS INDEX: 25.71 KG/M2 | RESPIRATION RATE: 18 BRPM | HEIGHT: 66 IN | OXYGEN SATURATION: 97 %

## 2024-10-14 DIAGNOSIS — M54.50 CHRONIC MIDLINE LOW BACK PAIN WITHOUT SCIATICA: Primary | ICD-10-CM

## 2024-10-14 DIAGNOSIS — H61.23 BILATERAL IMPACTED CERUMEN: ICD-10-CM

## 2024-10-14 DIAGNOSIS — G89.29 CHRONIC MIDLINE LOW BACK PAIN WITHOUT SCIATICA: Primary | ICD-10-CM

## 2024-10-14 PROCEDURE — 99213 OFFICE O/P EST LOW 20 MIN: CPT | Performed by: NURSE PRACTITIONER

## 2024-10-14 PROCEDURE — 20552 NJX 1/MLT TRIGGER POINT 1/2: CPT | Performed by: NURSE PRACTITIONER

## 2024-10-14 PROCEDURE — 69209 REMOVE IMPACTED EAR WAX UNI: CPT | Performed by: NURSE PRACTITIONER

## 2024-10-14 RX ORDER — TRIAMCINOLONE ACETONIDE 40 MG/ML
40 INJECTION, SUSPENSION INTRA-ARTICULAR; INTRAMUSCULAR ONCE
Status: COMPLETED | OUTPATIENT
Start: 2024-10-14 | End: 2024-10-14

## 2024-10-14 RX ORDER — LIDOCAINE HYDROCHLORIDE 10 MG/ML
5 INJECTION, SOLUTION INFILTRATION; PERINEURAL ONCE
Status: COMPLETED | OUTPATIENT
Start: 2024-10-14 | End: 2024-10-14

## 2024-10-14 RX ORDER — KETOROLAC TROMETHAMINE 10 MG/1
10 TABLET, FILM COATED ORAL EVERY 6 HOURS PRN
Qty: 20 TABLET | Refills: 0 | Status: SHIPPED | OUTPATIENT
Start: 2024-10-14 | End: 2024-10-15

## 2024-10-14 RX ORDER — RISPERIDONE 1 MG/1
1 TABLET ORAL 2 TIMES DAILY
Qty: 60 TABLET | Refills: 3 | Status: SHIPPED | OUTPATIENT
Start: 2024-10-14

## 2024-10-14 RX ADMIN — LIDOCAINE HYDROCHLORIDE 5 ML: 10 INJECTION, SOLUTION INFILTRATION; PERINEURAL at 09:35

## 2024-10-14 RX ADMIN — TRIAMCINOLONE ACETONIDE 40 MG: 40 INJECTION, SUSPENSION INTRA-ARTICULAR; INTRAMUSCULAR at 09:37

## 2024-10-14 ASSESSMENT — PATIENT HEALTH QUESTIONNAIRE - PHQ9
10. IF YOU CHECKED OFF ANY PROBLEMS, HOW DIFFICULT HAVE THESE PROBLEMS MADE IT FOR YOU TO DO YOUR WORK, TAKE CARE OF THINGS AT HOME, OR GET ALONG WITH OTHER PEOPLE: NOT DIFFICULT AT ALL
5. POOR APPETITE OR OVEREATING: NOT AT ALL
6. FEELING BAD ABOUT YOURSELF - OR THAT YOU ARE A FAILURE OR HAVE LET YOURSELF OR YOUR FAMILY DOWN: NOT AT ALL
8. MOVING OR SPEAKING SO SLOWLY THAT OTHER PEOPLE COULD HAVE NOTICED. OR THE OPPOSITE, BEING SO FIGETY OR RESTLESS THAT YOU HAVE BEEN MOVING AROUND A LOT MORE THAN USUAL: NOT AT ALL
SUM OF ALL RESPONSES TO PHQ9 QUESTIONS 1 & 2: 0
1. LITTLE INTEREST OR PLEASURE IN DOING THINGS: NOT AT ALL
7. TROUBLE CONCENTRATING ON THINGS, SUCH AS READING THE NEWSPAPER OR WATCHING TELEVISION: NOT AT ALL
SUM OF ALL RESPONSES TO PHQ QUESTIONS 1-9: 0
SUM OF ALL RESPONSES TO PHQ QUESTIONS 1-9: 0
3. TROUBLE FALLING OR STAYING ASLEEP: NOT AT ALL
SUM OF ALL RESPONSES TO PHQ QUESTIONS 1-9: 0
SUM OF ALL RESPONSES TO PHQ QUESTIONS 1-9: 0
4. FEELING TIRED OR HAVING LITTLE ENERGY: NOT AT ALL
9. THOUGHTS THAT YOU WOULD BE BETTER OFF DEAD, OR OF HURTING YOURSELF: NOT AT ALL
2. FEELING DOWN, DEPRESSED OR HOPELESS: NOT AT ALL

## 2024-10-14 NOTE — PROGRESS NOTES
Patient tolerated bilateral ear irrigation with warm water without discomfort or dizziness\"Have you been to the ER, urgent care clinic since your last visit?  Hospitalized since your last visit?\"    NO    “Have you seen or consulted any other health care providers outside of [facility] since your last visit?”    NO            Click Here for Release of Records Request      No chief complaint on file.        Vitals:    10/14/24 0817   BP: 118/72   Pulse: 61   Temp: 96.8 °F (36 °C)   SpO2: 97%

## 2024-10-14 NOTE — PROGRESS NOTES
Subjective:      Nato Small is a 63 y.o. male who presents for follow up of low back problems. Current symptoms include: pain in midline lumbar spine (aching and constant in character; 10/10 in severity). He endorses that symptoms have worsened from the previous visit. Exacerbating factors identified by the patient are sitting or standing for extended periods of time. Mr. Small has been established with Dr. Messer at [facility]. He endorses that he has not been back to see Dr. Messer recently due to transportation constraints.     Past Medical History:   Diagnosis Date    GERD (gastroesophageal reflux disease)     Hx of ulcer disease     Hypertension     Sciatic pain     Violent behavior     recenlty - reported by his sister (been seeing neurologist)     Patient Active Problem List    Diagnosis Date Noted    Cannabis use disorder, severe, in early remission ([facility]) 02/15/2024    Alcohol dependence in remission ([facility]) 09/19/2023    Alcohol dependence with alcohol-induced persisting dementia ([facility]) 09/19/2023    Bipolar II disorder, most recent episode major depressive ([facility]) 09/19/2023    Learning disabilities 09/19/2023    Hypertension 12/07/2022    Rash 12/07/2022     Past Surgical History:   Procedure Laterality Date    COLONOSCOPY N/A 2/7/2023    COLONOSCOPY performed by Ken Easton MD at [facility] ENDOSCOPY    COLONOSCOPY N/A 8/23/2023    COLONOSCOPY POLYPECTOMY SNARE/COLD BIOPSY performed by Ken Easton MD at [facility] ENDOSCOPY    HERNIA REPAIR      ORTHOPEDIC SURGERY Left     rotator cuff repair- shoulder    UPPER GASTROINTESTINAL ENDOSCOPY N/A 8/23/2023    EGD BIOPSY performed by Ken Easton MD at [facility] ENDOSCOPY     Family History   Problem Relation Age of Onset    Ovarian Cancer Mother     Heart Attack Father     Cancer Brother     Colon Cancer Brother     Diabetes Mother     Cancer Mother      Social History     Socioeconomic History    Marital status: Single     Spouse name: None    Number

## 2024-10-14 NOTE — PROGRESS NOTES
[facility] PROCEDURE TIMEOUT NOTE        Chart reviewed for the following:   Eugenia PATINO APRN - NP, have reviewed the History, Physical and updated the Allergic reactions for Nato Small     TIME OUT performed immediately prior to start of procedure:   Eugenia PATINO APRN - NP, have performed the following reviews on Nato Small prior to the start of the procedure:            * Patient was identified by name and date of birth   * Agreement on procedure being performed was verified  * Risks and Benefits explained to the patient by VICENTE Chambers NP  * Procedure site verified and marked as necessary  * Patient was positioned for comfort  * Consent was signed and verified     Time: 09:28      Date of procedure: 10/14/2024    Procedure performed by:  VICENTE Chambers NP    Provider assisted by: CONCETTA Rodriges RN    Patient assisted by: self    Pre Procedural Pain Scale: 10    Procedure start time:  09:30 AM    Procedure end time:  09:35 AM    How tolerated by patient: tolerated the procedure well with no complications    Post Procedural Pain Scale: 2 - Hurts Little Bit    Comments: none    Post op instructions and patient education reviewed.  Patient states understanding.    Copy of discharge instructions given to patient in AVS.          Procedure:  After consent was obtained, using sterile technique the trigger point was prepped. Local anesthetic used: 1% lidocaine. Kenalog 40 mg and was then injected and the needle withdrawn.  The procedure was well tolerated.  The patient is asked to continue to rest the area for a few more days before resuming regular activities.  It may be more painful for the first 1-2 days.  Watch for fever, or increased swelling or persistent pain in the joint. Call or return to clinic prn if such symptoms occur or there is failure to improve as anticipated.    Eugenia HODGSON

## 2024-10-15 DIAGNOSIS — G89.29 CHRONIC MIDLINE LOW BACK PAIN WITHOUT SCIATICA: ICD-10-CM

## 2024-10-15 DIAGNOSIS — M54.50 CHRONIC MIDLINE LOW BACK PAIN WITHOUT SCIATICA: ICD-10-CM

## 2024-10-15 RX ORDER — KETOROLAC TROMETHAMINE 10 MG/1
TABLET, FILM COATED ORAL
Qty: 30 TABLET | Refills: 0 | Status: SHIPPED | OUTPATIENT
Start: 2024-10-15

## 2024-10-15 NOTE — TELEPHONE ENCOUNTER
Patient requesting refill on     Requested Prescriptions     Pending Prescriptions Disp Refills    ketorolac (TORADOL) 10 MG tablet [Pharmacy Med Name: KETOROLAC TAB 10MG] 30 tablet      Sig: TAKE 1 TABLET EVERY 6 HOURSAS NEEDED FOR PAIN.        Last OV  10/14/2024

## 2024-10-16 ENCOUNTER — TELEPHONE (OUTPATIENT)
Age: 64
End: 2024-10-16

## 2024-10-16 NOTE — TELEPHONE ENCOUNTER
Pharmacy states they received RX for ketorolac. There is a drug to drug interaction with meloxicam. It can cause a risk of gastro issues. They want to know if Eugenia wants them to fill the ketorolac, if so can he also take the meloxicam. Give them a call back and give the ref # [id number]

## 2024-10-21 NOTE — TELEPHONE ENCOUNTER
Pt is still waiting for call about about 10-16-24 message  there seems to be a issue with his medications and the pharmacy

## 2024-10-28 ENCOUNTER — OFFICE VISIT (OUTPATIENT)
Age: 64
End: 2024-10-28
Payer: COMMERCIAL

## 2024-10-28 VITALS
HEIGHT: 66 IN | OXYGEN SATURATION: 98 % | BODY MASS INDEX: 25.71 KG/M2 | HEART RATE: 72 BPM | WEIGHT: 160 LBS | DIASTOLIC BLOOD PRESSURE: 82 MMHG | SYSTOLIC BLOOD PRESSURE: 120 MMHG | RESPIRATION RATE: 20 BRPM | TEMPERATURE: 97.3 F

## 2024-10-28 DIAGNOSIS — Z23 NEEDS FLU SHOT: Primary | ICD-10-CM

## 2024-10-28 DIAGNOSIS — Z23 ENCOUNTER FOR IMMUNIZATION: ICD-10-CM

## 2024-10-28 DIAGNOSIS — F31.81 BIPOLAR II DISORDER, MOST RECENT EPISODE MAJOR DEPRESSIVE (HCC): ICD-10-CM

## 2024-10-28 DIAGNOSIS — G89.29 CHRONIC MIDLINE LOW BACK PAIN WITHOUT SCIATICA: ICD-10-CM

## 2024-10-28 DIAGNOSIS — M54.50 CHRONIC MIDLINE LOW BACK PAIN WITHOUT SCIATICA: ICD-10-CM

## 2024-10-28 PROCEDURE — 36415 COLL VENOUS BLD VENIPUNCTURE: CPT | Performed by: NURSE PRACTITIONER

## 2024-10-28 PROCEDURE — 90661 CCIIV3 VAC ABX FR 0.5 ML IM: CPT | Performed by: NURSE PRACTITIONER

## 2024-10-28 PROCEDURE — 99214 OFFICE O/P EST MOD 30 MIN: CPT | Performed by: NURSE PRACTITIONER

## 2024-10-28 PROCEDURE — 90732 PPSV23 VACC 2 YRS+ SUBQ/IM: CPT | Performed by: NURSE PRACTITIONER

## 2024-10-28 PROCEDURE — 3079F DIAST BP 80-89 MM HG: CPT | Performed by: NURSE PRACTITIONER

## 2024-10-28 PROCEDURE — 3074F SYST BP LT 130 MM HG: CPT | Performed by: NURSE PRACTITIONER

## 2024-10-28 PROCEDURE — 90471 IMMUNIZATION ADMIN: CPT | Performed by: NURSE PRACTITIONER

## 2024-10-28 PROCEDURE — 90472 IMMUNIZATION ADMIN EACH ADD: CPT | Performed by: NURSE PRACTITIONER

## 2024-10-28 ASSESSMENT — PATIENT HEALTH QUESTIONNAIRE - PHQ9
1. LITTLE INTEREST OR PLEASURE IN DOING THINGS: NOT AT ALL
2. FEELING DOWN, DEPRESSED OR HOPELESS: NOT AT ALL
7. TROUBLE CONCENTRATING ON THINGS, SUCH AS READING THE NEWSPAPER OR WATCHING TELEVISION: NOT AT ALL
3. TROUBLE FALLING OR STAYING ASLEEP: NOT AT ALL
10. IF YOU CHECKED OFF ANY PROBLEMS, HOW DIFFICULT HAVE THESE PROBLEMS MADE IT FOR YOU TO DO YOUR WORK, TAKE CARE OF THINGS AT HOME, OR GET ALONG WITH OTHER PEOPLE: NOT DIFFICULT AT ALL
8. MOVING OR SPEAKING SO SLOWLY THAT OTHER PEOPLE COULD HAVE NOTICED. OR THE OPPOSITE, BEING SO FIGETY OR RESTLESS THAT YOU HAVE BEEN MOVING AROUND A LOT MORE THAN USUAL: NOT AT ALL
SUM OF ALL RESPONSES TO PHQ QUESTIONS 1-9: 0
6. FEELING BAD ABOUT YOURSELF - OR THAT YOU ARE A FAILURE OR HAVE LET YOURSELF OR YOUR FAMILY DOWN: NOT AT ALL
4. FEELING TIRED OR HAVING LITTLE ENERGY: NOT AT ALL
SUM OF ALL RESPONSES TO PHQ QUESTIONS 1-9: 0
SUM OF ALL RESPONSES TO PHQ9 QUESTIONS 1 & 2: 0
5. POOR APPETITE OR OVEREATING: NOT AT ALL

## 2024-10-28 NOTE — PROGRESS NOTES
\"Have you been to the ER, urgent care clinic since your last visit?  Hospitalized since your last visit?\"    NO    “Have you seen or consulted any other health care providers outside of [facility] since your last visit?”    NO            Click Here for Release of Records Request      Chief Complaint   Patient presents with    Back Pain     F/u           Vitals:    10/28/24 0947   BP: 120/82   Pulse: 72   Resp: 20   Temp: 97.3 °F (36.3 °C)   SpO2: 98%

## 2024-10-28 NOTE — PROGRESS NOTES
Patient here for labs  drawn from rightPatient was administered Pneumonia 23 and influenza vaccine via Im injection.  Patient tolerated medication side effects.  Medication information reviewed with patient, patient states understanding. Patient to resume routine medications at home.  Patient given copy of AVS with medication information and instructions for home.  AVS reviewed with patient and patient states understanding.      antecubital without pain or bruising.

## 2024-10-28 NOTE — PROGRESS NOTES
Subjective:     Nato Small is a 63 y.o. male who presents today with the following:  Chief Complaint   Patient presents with    Back Pain     F/u         Patient Active Problem List   Diagnosis    Hypertension    Rash    Alcohol dependence in remission (HCC)    Alcohol dependence with alcohol-induced persisting dementia (HCC)    Bipolar II disorder, most recent episode major depressive (HCC)    Learning disabilities    Cannabis use disorder, severe, in early remission (HCC)     Chronic midline low back pain without sciatica: Mr. Small reports that the trigger point injection received 10/14/2024 has significantly improved his lower back pain. He endorses that he still experiences mild intermittent lower back pain which is exacerbated by the cold weather, though he reports feeling as though this discomfort is well controlled with use of his Gabapentin and Tylenol as needed.       COMPLIANT WITH MEDICATION:   HTN; Denies chest pain, dyspnea, palpitations, headache and blurred vision. Blood pressure normotensive. Well controlled on lisinopril (PRINIVIL;ZESTRIL) 20 MG tablet, TAKE 1 TABLET DAILY and amLODIPine (NORVASC) 10 MG tablet, TAKE 1 TABLET DAILY,     depression screening addressed not at risk    abuse screening addressed denies    learning assessment addressed reviewed nurses notes    fall risk addressed not at risk    HM: addressed. Flu and Pneumonia immunizations received.     ROS:  Gen: denies fever, chills, fatigue, weight loss, weight gain  HEENT:denies blurry vision, nasal congestion, sore throat  Resp: denies dypsnea, cough, wheezing  CV: denies chest pain radiating to the jaws or arms, palpitations, lower extremity edema  Abd: denies nausea, vomiting, diarrhea, constipation  Musculoskeletal: Reports mild intermittent non-radiating lower back pain  Neuro: denies numbness/tingling  Endo: denies polyuria, polydipsia, heat/cold intolerance  Heme: no lymphadenopathy    No Known Allergies      Current

## 2024-10-29 LAB — VALPROATE SERPL-MCNC: 35 UG/ML (ref 50–100)

## 2024-11-12 ENCOUNTER — OFFICE VISIT (OUTPATIENT)
Age: 64
End: 2024-11-12
Payer: COMMERCIAL

## 2024-11-12 VITALS
HEART RATE: 64 BPM | HEIGHT: 66 IN | DIASTOLIC BLOOD PRESSURE: 80 MMHG | OXYGEN SATURATION: 99 % | SYSTOLIC BLOOD PRESSURE: 132 MMHG | TEMPERATURE: 97 F | BODY MASS INDEX: 26.81 KG/M2 | WEIGHT: 166.8 LBS | RESPIRATION RATE: 18 BRPM

## 2024-11-12 DIAGNOSIS — M54.50 CHRONIC MIDLINE LOW BACK PAIN WITHOUT SCIATICA: ICD-10-CM

## 2024-11-12 DIAGNOSIS — G89.29 CHRONIC MIDLINE LOW BACK PAIN WITHOUT SCIATICA: ICD-10-CM

## 2024-11-12 DIAGNOSIS — I10 PRIMARY HYPERTENSION: Primary | ICD-10-CM

## 2024-11-12 DIAGNOSIS — H61.23 BILATERAL IMPACTED CERUMEN: ICD-10-CM

## 2024-11-12 DIAGNOSIS — Z12.5 SCREENING FOR PROSTATE CANCER: ICD-10-CM

## 2024-11-12 PROCEDURE — 36415 COLL VENOUS BLD VENIPUNCTURE: CPT | Performed by: NURSE PRACTITIONER

## 2024-11-12 PROCEDURE — 69209 REMOVE IMPACTED EAR WAX UNI: CPT | Performed by: NURSE PRACTITIONER

## 2024-11-12 PROCEDURE — 99214 OFFICE O/P EST MOD 30 MIN: CPT | Performed by: NURSE PRACTITIONER

## 2024-11-12 PROCEDURE — 3075F SYST BP GE 130 - 139MM HG: CPT | Performed by: NURSE PRACTITIONER

## 2024-11-12 PROCEDURE — 3079F DIAST BP 80-89 MM HG: CPT | Performed by: NURSE PRACTITIONER

## 2024-11-12 ASSESSMENT — PATIENT HEALTH QUESTIONNAIRE - PHQ9
SUM OF ALL RESPONSES TO PHQ QUESTIONS 1-9: 5
7. TROUBLE CONCENTRATING ON THINGS, SUCH AS READING THE NEWSPAPER OR WATCHING TELEVISION: NOT AT ALL
4. FEELING TIRED OR HAVING LITTLE ENERGY: SEVERAL DAYS
10. IF YOU CHECKED OFF ANY PROBLEMS, HOW DIFFICULT HAVE THESE PROBLEMS MADE IT FOR YOU TO DO YOUR WORK, TAKE CARE OF THINGS AT HOME, OR GET ALONG WITH OTHER PEOPLE: SOMEWHAT DIFFICULT
5. POOR APPETITE OR OVEREATING: SEVERAL DAYS
3. TROUBLE FALLING OR STAYING ASLEEP: SEVERAL DAYS
6. FEELING BAD ABOUT YOURSELF - OR THAT YOU ARE A FAILURE OR HAVE LET YOURSELF OR YOUR FAMILY DOWN: NOT AT ALL
SUM OF ALL RESPONSES TO PHQ QUESTIONS 1-9: 5
SUM OF ALL RESPONSES TO PHQ9 QUESTIONS 1 & 2: 2
2. FEELING DOWN, DEPRESSED OR HOPELESS: SEVERAL DAYS
SUM OF ALL RESPONSES TO PHQ QUESTIONS 1-9: 5
8. MOVING OR SPEAKING SO SLOWLY THAT OTHER PEOPLE COULD HAVE NOTICED. OR THE OPPOSITE, BEING SO FIGETY OR RESTLESS THAT YOU HAVE BEEN MOVING AROUND A LOT MORE THAN USUAL: NOT AT ALL
1. LITTLE INTEREST OR PLEASURE IN DOING THINGS: SEVERAL DAYS
9. THOUGHTS THAT YOU WOULD BE BETTER OFF DEAD, OR OF HURTING YOURSELF: NOT AT ALL
SUM OF ALL RESPONSES TO PHQ QUESTIONS 1-9: 5

## 2024-11-12 NOTE — PROGRESS NOTES
\"Have you been to the ER, urgent care clinic since your last visit?  Hospitalized since your last visit?\"    NO    “Have you seen or consulted any other health care providers outside of [facility] since your last visit?”    NO            Click Here for Release of Records Request      Chief Complaint   Patient presents with    Bipolar         Vitals:    11/12/24 0843   BP: 132/80   Pulse: 64   Resp: 18   Temp: 97 °F (36.1 °C)   SpO2: 99%

## 2024-11-12 NOTE — PROGRESS NOTES
Subjective:     Nato Small is a 63 y.o. male who presents today with the following:  Chief Complaint   Patient presents with    Bipolar       Patient Active Problem List   Diagnosis    Hypertension    Rash    Alcohol dependence in remission (HCC)    Alcohol dependence with alcohol-induced persisting dementia (HCC)    Bipolar II disorder, most recent episode major depressive (HCC)    Learning disabilities    Cannabis use disorder, severe, in early remission (HCC)     HTN: Denies chest pain, dyspnea, palpitations, blurred vision or headache. Blood pressure normotensive. Cell controlled on current medication.     ROS:  Gen: denies fever, chills, fatigue, weight loss, weight gain  HEENT:denies blurry vision, nasal congestion, sore throat.   Resp: denies dypsnea, cough, wheezing  CV: denies chest pain radiating to the jaws or arms, palpitations, lower extremity edema  Abd: denies nausea, vomiting, diarrhea, constipation  Neuro: denies numbness/tingling  Endo: denies polyuria, polydipsia, heat/cold intolerance  Heme: no lymphadenopathy  Psych: Reports stable mood with no fluctuations at this time    No Known Allergies      Current Outpatient Medications:     ketorolac (TORADOL) 10 MG tablet, TAKE 1 TABLET EVERY 6 HOURSAS NEEDED FOR PAIN., Disp: 30 tablet, Rfl: 0    risperiDONE (RISPERDAL) 1 MG tablet, Take 1 tablet by mouth 2 times daily, Disp: 60 tablet, Rfl: 3    meloxicam (MOBIC) 15 MG tablet, Take 1 tablet by mouth daily as needed for Pain, Disp: 90 tablet, Rfl: 1    lisinopril (PRINIVIL;ZESTRIL) 20 MG tablet, TAKE 1 TABLET DAILY, Disp: 90 tablet, Rfl: 1    amLODIPine (NORVASC) 10 MG tablet, TAKE 1 TABLET DAILY, Disp: 90 tablet, Rfl: 1    escitalopram (LEXAPRO) 20 MG tablet, Take 1 tablet by mouth daily, Disp: 30 tablet, Rfl: 5    mirtazapine (REMERON) 15 MG tablet, Take 1 tablet by mouth nightly, Disp: 30 tablet, Rfl: 5    divalproex (DEPAKOTE) 500 MG DR tablet, Take 1 tablet by mouth 2 times daily, Disp: 60

## 2024-11-12 NOTE — PROGRESS NOTES
Patient here for labs drawn from right Patient tolerated bilateral ear irrigation with warm water without discomfort or dizziness antecubital without pain or bruising.

## 2024-11-13 LAB — PSA SERPL-MCNC: 0.2 NG/ML (ref 0.01–4)

## 2024-11-15 RX ORDER — PSEUDOEPHED/ACETAMINOPH/DIPHEN 30MG-500MG
TABLET ORAL
Qty: 120 TABLET | Refills: 3 | Status: SHIPPED | OUTPATIENT
Start: 2024-11-15

## 2024-11-15 NOTE — TELEPHONE ENCOUNTER
Patient requesting refill on     Requested Prescriptions     Pending Prescriptions Disp Refills    Acetaminophen Extra Strength 500 MG TABS [Pharmacy Med Name: ACETAMINOPHN TAB 500MG] 120 tablet 3     Sig: TAKE 1 TABLET EVERY 6 HOURSAS NEEDED FOR PAIN        Last OV   No

## 2025-03-17 DIAGNOSIS — F31.81 BIPOLAR II DISORDER, MOST RECENT EPISODE MAJOR DEPRESSIVE (HCC): ICD-10-CM

## 2025-03-18 RX ORDER — ESCITALOPRAM OXALATE 20 MG/1
20 TABLET ORAL DAILY
Qty: 90 TABLET | Refills: 1 | Status: SHIPPED | OUTPATIENT
Start: 2025-03-18

## 2025-03-18 RX ORDER — DIVALPROEX SODIUM 500 MG/1
500 TABLET, DELAYED RELEASE ORAL 2 TIMES DAILY
Qty: 180 TABLET | Refills: 1 | Status: SHIPPED | OUTPATIENT
Start: 2025-03-18

## 2025-03-18 RX ORDER — MIRTAZAPINE 15 MG/1
15 TABLET, FILM COATED ORAL NIGHTLY
Qty: 90 TABLET | Refills: 1 | Status: SHIPPED | OUTPATIENT
Start: 2025-03-18

## 2025-04-18 NOTE — BH NOTE
Group Therapy Note    Date: 1/15/2024    Group Start Time: 10:00 AM  Group End Time: 10:50 AM  Group Topic: Psychotherapy    [facility]    Shameka Murphy LCSW        Group Therapy Note    Attendees: 10 scheduled    Focus of session was a review of the weekend and successes that can be identified in recognizing and managing triggers to negative feeling states.  We spoke about what was done in managing triggers that led to success and what was done or not done that led to struggles in maintaining and getting through difficult moments.  We also spoke about goals to work towards this week and what is reasonable to accomplish by the end of the week.       Behavioral Health Daily Check In form revealed that pt is not experiencing SI/HI thoughts or plans, remaining abstinent from drugs and alcohol, and reports goal today trying to get help.   Patient's Goal:  1Um F31.81N “Pt will work on identifying if current emotional state is based on external or internal factors to help her identify an effective coping strategy    Notes:  Pt listened to the group discussion but was quiet.  He stated he did not sleep well last night as he is in so much pain.  He did not take his sleeping pill as he stated he had a bad day yesterday.  We discussed him placing his medicine by his tooth brush as this has helped in the past. He is feeling anxious to go to his Dr. Appointment next Tuesday.     Status After Intervention:  Improved    Participation Level: Active Listener and Interactive    Participation Quality: Appropriate, Attentive, and Sharing      Speech:  normal      Thought Process/Content: Logical      Affective Functioning: Congruent      Mood: anxious, depressed, and irritable      Level of consciousness:  Attentive      Response to Learning: Able to verbalize current knowledge/experience      Modes of Intervention: Support, Socialization,  Caller: NICOLE MYRICK MUTUAL    Relationship:     Best call back number: [phone]    What form or medical record are you requestin25 PROG NOTE AND ANY ORDERS    Who is requesting this form or medical record from you: WORK [employer]    How would you like to receive the form or medical records (pick-up, mail, fax): FAX  If fax, what is the fax number: [phone]

## 2025-05-12 ENCOUNTER — OFFICE VISIT (OUTPATIENT)
Age: 65
End: 2025-05-12
Payer: COMMERCIAL

## 2025-05-12 VITALS
DIASTOLIC BLOOD PRESSURE: 62 MMHG | HEART RATE: 64 BPM | SYSTOLIC BLOOD PRESSURE: 102 MMHG | WEIGHT: 167 LBS | HEIGHT: 66 IN | RESPIRATION RATE: 18 BRPM | TEMPERATURE: 96.8 F | OXYGEN SATURATION: 99 % | BODY MASS INDEX: 26.84 KG/M2

## 2025-05-12 DIAGNOSIS — F31.81 BIPOLAR II DISORDER, MOST RECENT EPISODE MAJOR DEPRESSIVE (HCC): ICD-10-CM

## 2025-05-12 DIAGNOSIS — M54.50 CHRONIC MIDLINE LOW BACK PAIN WITHOUT SCIATICA: ICD-10-CM

## 2025-05-12 DIAGNOSIS — I10 PRIMARY HYPERTENSION: Primary | ICD-10-CM

## 2025-05-12 DIAGNOSIS — G89.29 CHRONIC MIDLINE LOW BACK PAIN WITHOUT SCIATICA: ICD-10-CM

## 2025-05-12 PROCEDURE — 99214 OFFICE O/P EST MOD 30 MIN: CPT | Performed by: NURSE PRACTITIONER

## 2025-05-12 PROCEDURE — 3074F SYST BP LT 130 MM HG: CPT | Performed by: NURSE PRACTITIONER

## 2025-05-12 PROCEDURE — 36415 COLL VENOUS BLD VENIPUNCTURE: CPT | Performed by: NURSE PRACTITIONER

## 2025-05-12 PROCEDURE — 3078F DIAST BP <80 MM HG: CPT | Performed by: NURSE PRACTITIONER

## 2025-05-12 RX ORDER — KETOROLAC TROMETHAMINE 10 MG/1
10 TABLET, FILM COATED ORAL EVERY 6 HOURS PRN
Qty: 30 TABLET | Refills: 1 | Status: SHIPPED | OUTPATIENT
Start: 2025-05-12 | End: 2025-05-18

## 2025-05-12 RX ORDER — RISPERIDONE 1 MG/1
1 TABLET ORAL 2 TIMES DAILY
Qty: 60 TABLET | Refills: 3 | Status: SHIPPED | OUTPATIENT
Start: 2025-05-12

## 2025-05-12 RX ORDER — ARIPIPRAZOLE 2 MG/1
2 TABLET ORAL DAILY
Qty: 90 TABLET | Refills: 3 | Status: SHIPPED | OUTPATIENT
Start: 2025-05-12

## 2025-05-12 RX ORDER — ESCITALOPRAM OXALATE 20 MG/1
20 TABLET ORAL DAILY
Qty: 90 TABLET | Refills: 3 | Status: SHIPPED | OUTPATIENT
Start: 2025-05-12

## 2025-05-12 SDOH — ECONOMIC STABILITY: FOOD INSECURITY: WITHIN THE PAST 12 MONTHS, YOU WORRIED THAT YOUR FOOD WOULD RUN OUT BEFORE YOU GOT MONEY TO BUY MORE.: NEVER TRUE

## 2025-05-12 SDOH — ECONOMIC STABILITY: FOOD INSECURITY: WITHIN THE PAST 12 MONTHS, THE FOOD YOU BOUGHT JUST DIDN'T LAST AND YOU DIDN'T HAVE MONEY TO GET MORE.: NEVER TRUE

## 2025-05-12 ASSESSMENT — PATIENT HEALTH QUESTIONNAIRE - PHQ9
8. MOVING OR SPEAKING SO SLOWLY THAT OTHER PEOPLE COULD HAVE NOTICED. OR THE OPPOSITE, BEING SO FIGETY OR RESTLESS THAT YOU HAVE BEEN MOVING AROUND A LOT MORE THAN USUAL: NOT AT ALL
10. IF YOU CHECKED OFF ANY PROBLEMS, HOW DIFFICULT HAVE THESE PROBLEMS MADE IT FOR YOU TO DO YOUR WORK, TAKE CARE OF THINGS AT HOME, OR GET ALONG WITH OTHER PEOPLE: NOT DIFFICULT AT ALL
9. THOUGHTS THAT YOU WOULD BE BETTER OFF DEAD, OR OF HURTING YOURSELF: NOT AT ALL
5. POOR APPETITE OR OVEREATING: SEVERAL DAYS
SUM OF ALL RESPONSES TO PHQ QUESTIONS 1-9: 4
4. FEELING TIRED OR HAVING LITTLE ENERGY: SEVERAL DAYS
6. FEELING BAD ABOUT YOURSELF - OR THAT YOU ARE A FAILURE OR HAVE LET YOURSELF OR YOUR FAMILY DOWN: NOT AT ALL
SUM OF ALL RESPONSES TO PHQ QUESTIONS 1-9: 4
1. LITTLE INTEREST OR PLEASURE IN DOING THINGS: SEVERAL DAYS
2. FEELING DOWN, DEPRESSED OR HOPELESS: SEVERAL DAYS
SUM OF ALL RESPONSES TO PHQ QUESTIONS 1-9: 4
SUM OF ALL RESPONSES TO PHQ QUESTIONS 1-9: 4

## 2025-05-12 NOTE — ASSESSMENT & PLAN NOTE
Chronic, at goal (stable), continue current treatment plan    Orders:    escitalopram (LEXAPRO) 20 MG tablet; Take 1 tablet by mouth daily    risperiDONE (RISPERDAL) 1 MG tablet; Take 1 tablet by mouth 2 times daily    ARIPiprazole (ABILIFY) 2 MG tablet; Take 1 tablet by mouth daily

## 2025-05-12 NOTE — PROGRESS NOTES
Patient here for labs only drawn from left antecubital without pain or bruising.   
\"Have you been to the ER, urgent care clinic since your last visit?  Hospitalized since your last visit?\"    NO    “Have you seen or consulted any other health care providers outside of [facility] since your last visit?”    NO            Click Here for Release of Records Request      Chief Complaint   Patient presents with    Hypertension     ckeckup    Foot Pain     Right          Vitals:    05/12/25 0833   BP: 102/62   Pulse: 64   Resp: 18   Temp: 96.8 °F (36 °C)   SpO2: 99%       
no gallop  Abdomen: bowel sounds active. No tenderness, guarding, rebound, masses, hepatic or spleen enlargement  Back: no CVA tenderness.  Extremities: without clubbing, cyanosis, or edema  Pulses: radial and femoral pulses are normal  Neuro: HMF intact. Cranial nerves II through XII grossly normal.  Motor: is 5 over 5 and symmetrical.   Deep tendon reflexes: +2 equal  Psych:appropriate behavior, mood, affect and judgement.   No results found for this visit on 05/12/25.    Results         Lab Results   Component Value Date    WBC 9.1 06/13/2024    HGB 12.6 06/13/2024    HCT 41.2 06/13/2024     06/13/2024    CHOL 132 06/13/2024    TRIG 105 06/13/2024    HDL 52 06/13/2024    ALT 30 06/13/2024    AST 21 06/13/2024     06/13/2024    K 4.5 06/13/2024     06/13/2024    CREATININE 0.78 06/13/2024    BUN 17 06/13/2024    CO2 29 06/13/2024    TSH 1.770 11/01/2023    PSA 0.2 11/12/2024    LABA1C 5.3 06/13/2024        Assessment/ Plan:     Assessment & Plan  Bipolar II disorder, most recent episode major depressive (HCC)   Chronic, at goal (stable), continue current treatment plan    Orders:    escitalopram (LEXAPRO) 20 MG tablet; Take 1 tablet by mouth daily    risperiDONE (RISPERDAL) 1 MG tablet; Take 1 tablet by mouth 2 times daily    ARIPiprazole (ABILIFY) 2 MG tablet; Take 1 tablet by mouth daily    Primary hypertension   Chronic, at goal (stable), continue current treatment plan    Orders:    COLLECTION VENOUS BLOOD,VENIPUNCTURE    Lipid Panel; Future    Comprehensive Metabolic Panel; Future    CBC with Auto Differential; Future    Chronic midline low back pain without sciatica   Chronic, at goal (stable), continue current treatment plan    Orders:    ketorolac (TORADOL) 10 MG tablet; Take 1 tablet by mouth every 6 hours as needed for Pain         Medication Side Effects and Warnings were discussed with patient:  yes  Patient Labs were reviewed:  yes  Patient Past Records were reviewed:  yes    On

## 2025-05-13 ENCOUNTER — RESULTS FOLLOW-UP (OUTPATIENT)
Age: 65
End: 2025-05-13

## 2025-05-13 LAB
ALBUMIN SERPL-MCNC: 4 G/DL (ref 3.5–5)
ALBUMIN/GLOB SERPL: 1.1 (ref 1.1–2.2)
ALP SERPL-CCNC: 114 U/L (ref 45–117)
ALT SERPL-CCNC: 44 U/L (ref 12–78)
ANION GAP SERPL CALC-SCNC: 9 MMOL/L (ref 2–12)
AST SERPL-CCNC: 46 U/L (ref 15–37)
BASOPHILS # BLD: 0.07 K/UL (ref 0–0.1)
BASOPHILS NFR BLD: 0.9 % (ref 0–1)
BILIRUB SERPL-MCNC: 0.6 MG/DL (ref 0.2–1)
BUN SERPL-MCNC: 11 MG/DL (ref 6–20)
BUN/CREAT SERPL: 14 (ref 12–20)
CALCIUM SERPL-MCNC: 9.6 MG/DL (ref 8.5–10.1)
CHLORIDE SERPL-SCNC: 99 MMOL/L (ref 97–108)
CHOLEST SERPL-MCNC: 177 MG/DL
CO2 SERPL-SCNC: 24 MMOL/L (ref 21–32)
CREAT SERPL-MCNC: 0.81 MG/DL (ref 0.7–1.3)
DIFFERENTIAL METHOD BLD: ABNORMAL
EOSINOPHIL # BLD: 0.13 K/UL (ref 0–0.4)
EOSINOPHIL NFR BLD: 1.8 % (ref 0–7)
ERYTHROCYTE [DISTWIDTH] IN BLOOD BY AUTOMATED COUNT: 21.9 % (ref 11.5–14.5)
GLOBULIN SER CALC-MCNC: 3.8 G/DL (ref 2–4)
GLUCOSE SERPL-MCNC: 123 MG/DL (ref 65–100)
HCT VFR BLD AUTO: 40.9 % (ref 36.6–50.3)
HDLC SERPL-MCNC: 75 MG/DL
HDLC SERPL: 2.4 (ref 0–5)
HGB BLD-MCNC: 13.7 G/DL (ref 12.1–17)
IMM GRANULOCYTES # BLD AUTO: 0.02 K/UL (ref 0–0.04)
IMM GRANULOCYTES NFR BLD AUTO: 0.3 % (ref 0–0.5)
LDLC SERPL CALC-MCNC: 88.6 MG/DL (ref 0–100)
LYMPHOCYTES # BLD: 1.3 K/UL (ref 0.8–3.5)
LYMPHOCYTES NFR BLD: 17.6 % (ref 12–49)
MCH RBC QN AUTO: 29.3 PG (ref 26–34)
MCHC RBC AUTO-ENTMCNC: 33.5 G/DL (ref 30–36.5)
MCV RBC AUTO: 87.6 FL (ref 80–99)
MONOCYTES # BLD: 0.6 K/UL (ref 0–1)
MONOCYTES NFR BLD: 8.1 % (ref 5–13)
NEUTS SEG # BLD: 5.28 K/UL (ref 1.8–8)
NEUTS SEG NFR BLD: 71.3 % (ref 32–75)
NRBC # BLD: 0 K/UL (ref 0–0.01)
NRBC BLD-RTO: 0 PER 100 WBC
PLATELET # BLD AUTO: 288 K/UL (ref 150–400)
PMV BLD AUTO: 10.9 FL (ref 8.9–12.9)
POTASSIUM SERPL-SCNC: 4.5 MMOL/L (ref 3.5–5.1)
PROT SERPL-MCNC: 7.8 G/DL (ref 6.4–8.2)
RBC # BLD AUTO: 4.67 M/UL (ref 4.1–5.7)
RBC MORPH BLD: ABNORMAL
SODIUM SERPL-SCNC: 132 MMOL/L (ref 136–145)
TRIGL SERPL-MCNC: 67 MG/DL
VLDLC SERPL CALC-MCNC: 13.4 MG/DL
WBC # BLD AUTO: 7.4 K/UL (ref 4.1–11.1)

## 2025-05-15 ENCOUNTER — TELEPHONE (OUTPATIENT)
Age: 65
End: 2025-05-15

## 2025-05-15 NOTE — TELEPHONE ENCOUNTER
Geovanny is being told by [facility] that there is a problem with his RX ketorolac 10 mg and they have tried contacting Eugenia. Would like a call back to discuss if there is an issue.

## 2025-05-16 ENCOUNTER — TELEPHONE (OUTPATIENT)
Age: 65
End: 2025-05-16

## 2025-05-16 NOTE — TELEPHONE ENCOUNTER
[facility] states that pt is allergic to NSAIDS and the ketorolac (TORADOL) 10 MG tablet is an NSAID. Also states that every 6 hours is too high of a dose that recommend is once daily. Please advise.     [phone] option 2   Ref #[id number]

## 2025-05-18 RX ORDER — KETOROLAC TROMETHAMINE 10 MG/1
10 TABLET, FILM COATED ORAL DAILY PRN
Qty: 20 TABLET | Refills: 0 | Status: SHIPPED | OUTPATIENT
Start: 2025-05-18 | End: 2026-05-18

## 2025-05-20 RX ORDER — KETOROLAC TROMETHAMINE 10 MG/1
TABLET, FILM COATED ORAL
Qty: 10 TABLET | Refills: 0 | Status: SHIPPED | OUTPATIENT
Start: 2025-05-20 | End: 2025-05-30

## 2025-05-27 ENCOUNTER — TELEPHONE (OUTPATIENT)
Age: 65
End: 2025-05-27

## 2025-05-27 NOTE — TELEPHONE ENCOUNTER
Pharmacy called stating that prescription for ketorolac (TORADOL) 10 MG tablet is not in their system and they need it called in again. They would like provider to call [phone], stating that it looks like they are not going through electronically on their end.

## 2025-06-17 DIAGNOSIS — F31.81 BIPOLAR II DISORDER, MOST RECENT EPISODE MAJOR DEPRESSIVE (HCC): ICD-10-CM

## 2025-06-18 RX ORDER — RISPERIDONE 1 MG/1
1 TABLET ORAL 2 TIMES DAILY
Qty: 60 TABLET | Refills: 3 | Status: SHIPPED | OUTPATIENT
Start: 2025-06-18

## 2025-06-19 RX ORDER — RISPERIDONE 1 MG/1
1 TABLET ORAL 2 TIMES DAILY
Qty: 180 TABLET | Refills: 0 | OUTPATIENT
Start: 2025-06-19

## 2025-07-02 ENCOUNTER — OFFICE VISIT (OUTPATIENT)
Age: 65
End: 2025-07-02
Payer: COMMERCIAL

## 2025-07-02 VITALS
RESPIRATION RATE: 18 BRPM | BODY MASS INDEX: 27.45 KG/M2 | HEART RATE: 76 BPM | TEMPERATURE: 97.7 F | HEIGHT: 66 IN | WEIGHT: 170.8 LBS | SYSTOLIC BLOOD PRESSURE: 124 MMHG | DIASTOLIC BLOOD PRESSURE: 70 MMHG | OXYGEN SATURATION: 96 %

## 2025-07-02 DIAGNOSIS — L23.7 POISON IVY DERMATITIS: Primary | ICD-10-CM

## 2025-07-02 PROCEDURE — 99213 OFFICE O/P EST LOW 20 MIN: CPT | Performed by: NURSE PRACTITIONER

## 2025-07-02 PROCEDURE — 3074F SYST BP LT 130 MM HG: CPT | Performed by: NURSE PRACTITIONER

## 2025-07-02 PROCEDURE — 3078F DIAST BP <80 MM HG: CPT | Performed by: NURSE PRACTITIONER

## 2025-07-02 RX ORDER — METHYLPREDNISOLONE 4 MG/1
TABLET ORAL
Qty: 1 KIT | Refills: 0 | Status: SHIPPED | OUTPATIENT
Start: 2025-07-02 | End: 2025-07-08

## 2025-07-02 RX ORDER — PRAMOXINE HYDROCHLORIDE AND ZINC ACETATE 10; 1 MG/ML; MG/ML
LOTION TOPICAL
Qty: 177 ML | Refills: 0 | Status: SHIPPED | OUTPATIENT
Start: 2025-07-02

## 2025-07-02 RX ORDER — TRIAMCINOLONE ACETONIDE 40 MG/ML
40 INJECTION, SUSPENSION INTRA-ARTICULAR; INTRAMUSCULAR ONCE
Status: COMPLETED | OUTPATIENT
Start: 2025-07-02 | End: 2025-07-02

## 2025-07-02 RX ADMIN — TRIAMCINOLONE ACETONIDE 40 MG: 40 INJECTION, SUSPENSION INTRA-ARTICULAR; INTRAMUSCULAR at 16:42

## 2025-07-02 NOTE — PROGRESS NOTES
Patient was administered Kenalog 40 mgIM.  Patient tolerated medication .  Medication information reviewed with patient, patient states understanding. Patient to resume routine medications at home.  Patient given copy of AVS with medication information and instructions for home.  AVS reviewed with patient and patient states understanding.       
\"Have you been to the ER, urgent care clinic since your last visit?  Hospitalized since your last visit?\"    NO    “Have you seen or consulted any other health care providers outside of [facility] since your last visit?”    NO            Click Here for Release of Records Request      Chief Complaint   Patient presents with    Rash     On back for 2 weeks         Vitals:    07/02/25 1557   BP: 124/70   Pulse: 76   Resp: 18   Temp: 97.7 °F (36.5 °C)   SpO2: 96%     
Colon Cancer Brother     Diabetes Mother     Cancer Mother      Social History     Socioeconomic History    Marital status: Single     Spouse name: None    Number of children: None    Years of education: None    Highest education level: None   Tobacco Use    Smoking status: Never     Passive exposure: Never    Smokeless tobacco: Never   Vaping Use    Vaping status: Never Used   Substance and Sexual Activity    Alcohol use: Not Currently    Drug use: Not Currently     Types: Marijuana (Weed)    Sexual activity: Defer     Social Drivers of Health     Financial Resource Strain: Medium Risk (8/12/2024)    Overall Financial Resource Strain (CARDIA)     Difficulty of Paying Living Expenses: Somewhat hard   Food Insecurity: No Food Insecurity (5/12/2025)    Hunger Vital Sign     Worried About Running Out of Food in the Last Year: Never true     Ran Out of Food in the Last Year: Never true   Transportation Needs: No Transportation Needs (5/12/2025)    PRAPARE - Transportation     Lack of Transportation (Medical): No     Lack of Transportation (Non-Medical): No    Received from [facility]  (prior to 6/17/2023)    Exercise Vital Sign    Received from [facility]  (prior to 6/17/2023)    [place] [employer] - Occupational Stress Questionnaire   Social Connections: Moderately Isolated (1/23/2024)    Social Connection and Isolation Panel [NHANES]     Frequency of Communication with Friends and Family: More than three times a week     Frequency of Social Gatherings with Friends and Family: More than three times a week     Attends Sabianist Services: Never     Active Member of Clubs or Organizations: No     Attends Club or Organization Meetings: More than 4 times per year     Marital Status: Never    Housing Stability: High Risk (5/12/2025)    Housing Stability Vital Sign     Unable to Pay for Housing in the Last Year: Yes     Number of Times Moved in the Last Year: 0

## (undated) DEVICE — SYRINGE MED 10ML LUERLOCK TIP W/O SFTY DISP

## (undated) DEVICE — CANNULA CUSH AD W/ 14FT TBG

## (undated) DEVICE — CATH IV AUTOGRD BC PNK 20GA 25 -- INSYTE

## (undated) DEVICE — BLOCK BITE ENDOSCP AD 21 MM W/ DIL BLU LF DISP

## (undated) DEVICE — ELECTRODE,RADIOTRANSLUCENT,FOAM,5PK: Brand: MEDLINE

## (undated) DEVICE — FORCEPS BX L240CM JAW DIA2.4MM ORNG L CAP W/ NDL DISP RAD

## (undated) DEVICE — TOWEL 4 PLY TISS 19X30 SUE WHT

## (undated) DEVICE — HYPODERMIC SAFETY NEEDLE: Brand: MAGELLAN

## (undated) DEVICE — SINGLE-USE BIOPSY FORCEPS: Brand: RADIAL JAW 4

## (undated) DEVICE — NEONATAL-ADULT SPO2 SENSOR: Brand: NELLCOR

## (undated) DEVICE — CONTAINER SPEC 20 ML LID NEUT BUFF FORMALIN 10 % POLYPR STS

## (undated) DEVICE — 1200 GUARD II KIT W/5MM TUBE W/O VAC TUBE: Brand: GUARDIAN

## (undated) DEVICE — BASIN EMSIS 16OZ GRAPHITE PLAS KID SHP MOLD GRAD FOR ORAL

## (undated) DEVICE — YANKAUER,TAPERED BULBOUS TIP,W/O VENT: Brand: MEDLINE

## (undated) DEVICE — Device

## (undated) DEVICE — SYR 3ML LL TIP 1/10ML GRAD --

## (undated) DEVICE — SOLIDIFIER FLD 2OZ 1500CC N DISINF IN BTL DISP SAFESORB

## (undated) DEVICE — SNARE ENDOSCP POLYP MED STD AD 2.4X27X240 CM 2.8 MM OVL SENS

## (undated) DEVICE — BAG SPEC BIOHZRD 10 X 10 IN --

## (undated) DEVICE — Z DISCONTINUED PER MEDLINE LINE GAS SAMPLING O2/CO2 LNG AD 13 FT NSL W/ TBNG FILTERLINE

## (undated) DEVICE — SET ADMIN 16ML TBNG L100IN 2 Y INJ SITE IV PIGGY BK DISP (ORDER IN MULIPLES OF 48)

## (undated) DEVICE — FORCEPS BX L160CM DIA8MM GRSP DISECT CUP TIP NONLOCKING ROT